# Patient Record
Sex: FEMALE | Race: WHITE
[De-identification: names, ages, dates, MRNs, and addresses within clinical notes are randomized per-mention and may not be internally consistent; named-entity substitution may affect disease eponyms.]

---

## 2017-05-05 ENCOUNTER — HOSPITAL ENCOUNTER (INPATIENT)
Dept: HOSPITAL 33 - MED SURG | Age: 72
LOS: 7 days | Discharge: HOME HEALTH | DRG: 603 | End: 2017-05-12
Attending: FAMILY MEDICINE | Admitting: FAMILY MEDICINE
Payer: MEDICARE

## 2017-05-05 DIAGNOSIS — K59.00: ICD-10-CM

## 2017-05-05 DIAGNOSIS — F45.42: ICD-10-CM

## 2017-05-05 DIAGNOSIS — N28.9: ICD-10-CM

## 2017-05-05 DIAGNOSIS — L03.119: Primary | ICD-10-CM

## 2017-05-05 DIAGNOSIS — M54.9: ICD-10-CM

## 2017-05-05 DIAGNOSIS — G89.29: ICD-10-CM

## 2017-05-05 DIAGNOSIS — I10: ICD-10-CM

## 2017-05-05 DIAGNOSIS — I50.9: ICD-10-CM

## 2017-05-05 DIAGNOSIS — G47.33: ICD-10-CM

## 2017-05-05 DIAGNOSIS — E11.40: ICD-10-CM

## 2017-05-05 DIAGNOSIS — E66.01: ICD-10-CM

## 2017-05-05 LAB
ALBUMIN SERPL-MCNC: 3 G/DL (ref 3.4–5)
ALP SERPL-CCNC: 87 U/L (ref 46–116)
ALT SERPL-CCNC: 12 U/L (ref 12–78)
ANION GAP SERPL CALC-SCNC: 12.8 MEQ/L (ref 5–15)
AST SERPL QL: 15 U/L (ref 15–37)
BASOPHILS NFR BLD AUTO: 0.2 % (ref 0–0.4)
BILIRUB BLD-MCNC: 0.3 MG/DL (ref 0.2–1)
BUN SERPL-MCNC: 24 MG/DL (ref 9–20)
CHLORIDE SERPL-SCNC: 103 MEQ/L (ref 98–107)
CO2 SERPL-SCNC: 29.7 MEQ/L (ref 21–32)
GLUCOSE SERPL-MCNC: 237 MG/DL (ref 70–110)
MCH RBC QN AUTO: 27 PG (ref 26–32)
NEUTROPHILS NFR BLD AUTO: 65.2 % (ref 36–66)
PLATELET # BLD AUTO: 188 K/MM3 (ref 150–450)
POTASSIUM SERPLBLD-SCNC: 4.4 MEQ/L (ref 3.5–5.1)
PROT SERPL-MCNC: 7.1 GM/DL (ref 6.4–8.2)
RBC # BLD AUTO: 4.29 M/MM3 (ref 4.1–5.4)
SODIUM SERPL-SCNC: 141 MEQ/L (ref 136–145)
WBC # BLD AUTO: 9.3 K/MM3 (ref 4–10.5)

## 2017-05-05 PROCEDURE — 80202 ASSAY OF VANCOMYCIN: CPT

## 2017-05-05 PROCEDURE — 80053 COMPREHEN METABOLIC PANEL: CPT

## 2017-05-05 PROCEDURE — 82962 GLUCOSE BLOOD TEST: CPT

## 2017-05-05 PROCEDURE — 94760 N-INVAS EAR/PLS OXIMETRY 1: CPT

## 2017-05-05 PROCEDURE — 36415 COLL VENOUS BLD VENIPUNCTURE: CPT

## 2017-05-05 PROCEDURE — 87040 BLOOD CULTURE FOR BACTERIA: CPT

## 2017-05-05 PROCEDURE — 87070 CULTURE OTHR SPECIMN AEROBIC: CPT

## 2017-05-05 PROCEDURE — 94660 CPAP INITIATION&MGMT: CPT

## 2017-05-05 PROCEDURE — 94640 AIRWAY INHALATION TREATMENT: CPT

## 2017-05-05 PROCEDURE — 84134 ASSAY OF PREALBUMIN: CPT

## 2017-05-05 PROCEDURE — 85025 COMPLETE CBC W/AUTO DIFF WBC: CPT

## 2017-05-05 PROCEDURE — 80048 BASIC METABOLIC PNL TOTAL CA: CPT

## 2017-05-05 RX ADMIN — INSULIN ASPART PRN UNIT: 100 INJECTION, SOLUTION INTRAVENOUS; SUBCUTANEOUS at 22:28

## 2017-05-05 RX ADMIN — FLUTICASONE PROPIONATE AND SALMETEROL XINAFOATE SCH PUFF: 230; 21 AEROSOL, METERED RESPIRATORY (INHALATION) at 19:30

## 2017-05-05 RX ADMIN — HYDROCODONE BITARTRATE AND ACETAMINOPHEN PRN TAB: 7.5; 325 TABLET ORAL at 21:39

## 2017-05-05 RX ADMIN — OXYBUTYNIN CHLORIDE SCH MG: 5 TABLET ORAL at 22:25

## 2017-05-05 RX ADMIN — ENOXAPARIN SODIUM SCH MG: 100 INJECTION SUBCUTANEOUS at 18:34

## 2017-05-05 RX ADMIN — INSULIN ASPART PRN UNIT: 100 INJECTION, SOLUTION INTRAVENOUS; SUBCUTANEOUS at 17:17

## 2017-05-05 RX ADMIN — LOSARTAN POTASSIUM SCH MG: 50 TABLET, FILM COATED ORAL at 22:24

## 2017-05-05 RX ADMIN — CLONIDINE HYDROCHLORIDE SCH MG: 0.1 TABLET ORAL at 22:26

## 2017-05-05 RX ADMIN — BISACODYL SCH MG: 5 TABLET, COATED ORAL at 22:26

## 2017-05-05 RX ADMIN — VERAPAMIL HYDROCHLORIDE SCH MG: 180 TABLET, FILM COATED, EXTENDED RELEASE ORAL at 23:22

## 2017-05-05 RX ADMIN — BUMETANIDE SCH: 1 TABLET ORAL at 17:11

## 2017-05-05 RX ADMIN — HYDROCODONE BITARTRATE AND ACETAMINOPHEN PRN TAB: 7.5; 325 TABLET ORAL at 17:11

## 2017-05-05 RX ADMIN — NYSTATIN SCH GM: 100000 POWDER TOPICAL at 22:27

## 2017-05-05 RX ADMIN — SODIUM CHLORIDE SCH MLS/HR: 9 INJECTION, SOLUTION INTRAVENOUS at 18:34

## 2017-05-05 RX ADMIN — GABAPENTIN SCH MG: 300 CAPSULE ORAL at 22:25

## 2017-05-06 RX ADMIN — VERAPAMIL HYDROCHLORIDE SCH MG: 180 TABLET, FILM COATED, EXTENDED RELEASE ORAL at 21:34

## 2017-05-06 RX ADMIN — LEVOFLOXACIN SCH MLS/HR: 5 INJECTION, SOLUTION INTRAVENOUS at 11:25

## 2017-05-06 RX ADMIN — INSULIN ASPART PRN UNIT: 100 INJECTION, SOLUTION INTRAVENOUS; SUBCUTANEOUS at 16:50

## 2017-05-06 RX ADMIN — GABAPENTIN SCH MG: 300 CAPSULE ORAL at 08:23

## 2017-05-06 RX ADMIN — VERAPAMIL HYDROCHLORIDE SCH MG: 180 TABLET, FILM COATED, EXTENDED RELEASE ORAL at 08:24

## 2017-05-06 RX ADMIN — OXYBUTYNIN CHLORIDE SCH MG: 5 TABLET ORAL at 21:36

## 2017-05-06 RX ADMIN — OXYBUTYNIN CHLORIDE SCH MG: 5 TABLET ORAL at 08:24

## 2017-05-06 RX ADMIN — LOSARTAN POTASSIUM SCH MG: 50 TABLET, FILM COATED ORAL at 21:34

## 2017-05-06 RX ADMIN — OXYCODONE HYDROCHLORIDE AND ACETAMINOPHEN PRN TAB: 10; 325 TABLET ORAL at 13:41

## 2017-05-06 RX ADMIN — BISACODYL SCH MG: 5 TABLET, COATED ORAL at 21:37

## 2017-05-06 RX ADMIN — INSULIN ASPART PRN UNIT: 100 INJECTION, SOLUTION INTRAVENOUS; SUBCUTANEOUS at 08:26

## 2017-05-06 RX ADMIN — CLONIDINE HYDROCHLORIDE SCH MG: 0.1 TABLET ORAL at 21:36

## 2017-05-06 RX ADMIN — NYSTATIN SCH GM: 100000 POWDER TOPICAL at 08:24

## 2017-05-06 RX ADMIN — ASPIRIN SCH MG: 325 TABLET, COATED ORAL at 09:29

## 2017-05-06 RX ADMIN — SIMVASTATIN SCH MG: 10 TABLET, FILM COATED ORAL at 09:29

## 2017-05-06 RX ADMIN — ENOXAPARIN SODIUM SCH MG: 100 INJECTION SUBCUTANEOUS at 08:27

## 2017-05-06 RX ADMIN — FLUTICASONE PROPIONATE AND SALMETEROL XINAFOATE SCH PUFF: 230; 21 AEROSOL, METERED RESPIRATORY (INHALATION) at 07:34

## 2017-05-06 RX ADMIN — INSULIN ASPART PRN UNIT: 100 INJECTION, SOLUTION INTRAVENOUS; SUBCUTANEOUS at 13:07

## 2017-05-06 RX ADMIN — SODIUM CHLORIDE SCH MLS/HR: 9 INJECTION, SOLUTION INTRAVENOUS at 12:32

## 2017-05-06 RX ADMIN — POLYETHYLENE GLYCOL 3350 SCH GM: 17 POWDER, FOR SOLUTION ORAL at 11:25

## 2017-05-06 RX ADMIN — CLONIDINE HYDROCHLORIDE SCH MG: 0.1 TABLET ORAL at 15:24

## 2017-05-06 RX ADMIN — BUMETANIDE SCH MG: 1 TABLET ORAL at 08:23

## 2017-05-06 RX ADMIN — CLONIDINE HYDROCHLORIDE SCH MG: 0.1 TABLET ORAL at 08:24

## 2017-05-06 RX ADMIN — HYDROCODONE BITARTRATE AND ACETAMINOPHEN PRN TAB: 7.5; 325 TABLET ORAL at 08:31

## 2017-05-06 RX ADMIN — GABAPENTIN SCH MG: 300 CAPSULE ORAL at 15:24

## 2017-05-06 RX ADMIN — GABAPENTIN SCH MG: 300 CAPSULE ORAL at 21:36

## 2017-05-06 RX ADMIN — OXYCODONE HYDROCHLORIDE AND ACETAMINOPHEN PRN TAB: 10; 325 TABLET ORAL at 20:02

## 2017-05-06 RX ADMIN — FLUTICASONE PROPIONATE AND SALMETEROL XINAFOATE SCH PUFF: 230; 21 AEROSOL, METERED RESPIRATORY (INHALATION) at 19:13

## 2017-05-06 NOTE — PCM.NOTE
Date and Time: 05/06/17  1057





Subjective Assessment: 





She is c/o sharp pain in the RLE.  seems to have increased warmth since 

yesterday.  Williams po well. constipated.





Objective Exam


General Appearance: no apparent distress, obese


Neurologic Exam: alert, oriented x 3, cooperative


Skin Exam: warm


Respiratory Exam: normal breath sounds, lungs clear, No crackles/rales, No 

rhonchi, No wheezing


Cardiovascular Exam: regular rate/rhythm, normal heart sounds, No murmur


Gastrointestinal/Abdomen Exam: soft, No tenderness


Extremity Exam: other (LLE with chronic venous stasis change.  RLE wrapped 

initially with dried yellow exudate; there is an odor.  Erythema till approx 

4cm distal to the knee; warm. when unwrapped, erythema is muted 

throughout.anteriorly there are several areas of shallow depression with blood 

seeping (s/p PT).)





OBJECTIVE DATA


Vital Signs: 


 Vital Signs - 24 hr











  Temp Pulse Resp BP Pulse Ox


 


 05/06/17 07:58  97.8 F  63  24  146/66  92 L


 


 05/06/17 07:00   92 H  18   93 L


 


 05/06/17 03:53  98.3 F  60  19  126/60  97


 


 05/06/17 00:00  98.4 F  63  20  120/62  94 L


 


 05/05/17 19:46  97.7 F  63  18  130/66  95


 


 05/05/17 19:30   69  18   96


 


 05/05/17 16:45   72  20   94 L


 


 05/05/17 16:10  97.8 F  65  20  124/61  91 L








 Oxygen-Last 24 hours











O2 Percentage                  2 Liters = 28%


 


O2 Percentage                  2 Liters = 28%











 Pain Assessment - Last Documented











Pain Intensity                 6


 


Pain Scale Used                0-10 Pain Scale











Intake and Output: 


 Intake & Output











 05/03/17 05/04/17 05/05/17 05/06/17





 11:59 11:59 11:59 11:59


 


Intake Total    2467


 


Output Total    700


 


Balance    1767


 


Weight    189.42 kg











Lab Results: 


 Accuchecks











Date                           05/05/17


 


Date                           05/05/17


 


Time                           20:54


 


Time                           16:30


 


Accucheck Value:               258


 


Accucheck Value:               315


 


Accucheck Value:               217











 Lab Results-Last 24 Hours











  05/05/17 05/05/17 05/05/17 Range/Units





  16:15 16:15 16:16 


 


WBC  9.3    (4.0-10.5)  K/mm3


 


RBC  4.29    (4.1-5.4)  M/mm3


 


Hgb  11.6 L    (12.0-16.0)  gm/dl


 


Hct  37.7    (35-47)  %


 


MCV  87.9    ()  fl


 


MCH  27.0    (26-32)  pg


 


MCHC  30.8 L    (32-36)  g/dl


 


RDW  15.6 H    (11.5-14.0)  %


 


Plt Count  188    (150-450)  K/mm3


 


MPV  10.5 H    (6-9.5)  fl


 


Gran %  65.2    (36.0-66.0)  %


 


Lymphocytes %  22.7 L    (24.0-44.0)  %


 


Monocytes %  6.0    (0.0-12.0)  %


 


Eosinophils %  5.9 H    (0.00-5.0)  %


 


Basophils %  0.2    (0.0-0.4)  %


 


Basophils #  0.02    (0-0.4)  


 


Sodium   141   (136-145)  mEq/L


 


Potassium   4.4   (3.5-5.1)  mEq/L


 


Chloride   103   ()  mEq/L


 


Carbon Dioxide   29.7   (21-32)  mEq/L


 


Anion Gap   12.8   (5-15)  MEQ/L


 


BUN   24 H   (9-20)  mg/dL


 


Creatinine   1.26   (0.55-1.30)  mg/dl


 


Estimated GFR   44   ML/MIN


 


Glucose   237 H   ()  MG/DL


 


Calcium   9.3   (8.5-10.1)  mg/dL


 


Total Bilirubin   0.3   (0.2-1.0)  mg/dL


 


AST   15   (15-37)  U/L


 


ALT   12   (12-78)  U/L


 


Alkaline Phosphatase   87   ()  U/L


 


Serum Total Protein   7.1   (6.4-8.2)  gm/dL


 


Albumin   3.0 L   (3.4-5.0)  g/dL


 


Prealbumin    14.9 L  (18.0-35.7)  mg/dL














Assessment/Plan


(1) Cellulitis


Current Visit: Yes   Status: Acute   


Assessment & Plan: 


Seems a bit worse to her; will add IV levaquin to the IV vancomycin. Change 

norco to percocet for pain.


Code(s): L03.90 - CELLULITIS, UNSPECIFIED   





(2) Constipation


Current Visit: Yes   Status: Acute   


Assessment & Plan: 


add miralax and dulcolax suppository prn.


Code(s): K59.00 - CONSTIPATION, UNSPECIFIED   





(3) Renal insufficiency


Current Visit: Yes   Status: Acute   


Assessment & Plan: 


recheck in a.m.








(4) Morbid obesity


Current Visit: Yes   Status: Chronic   Code(s): E66.01 - MORBID (SEVERE) 

OBESITY DUE TO EXCESS CALORIES   





(5) Chronic back pain


Current Visit: Yes   Status: Chronic   


Qualifiers: 


   Back pain location: back pain in unspecified location   Back pain laterality

: unspecified   Qualified Code(s): M54.9 - Dorsalgia, unspecified; G89.29 - 

Other chronic pain   


Assessment & Plan: 


takes norco at baseline for back pain.


Code(s): M54.9 - DORSALGIA, UNSPECIFIED; G89.29 - OTHER CHRONIC PAIN

## 2017-05-07 RX ADMIN — INSULIN ASPART PRN UNIT: 100 INJECTION, SOLUTION INTRAVENOUS; SUBCUTANEOUS at 21:31

## 2017-05-07 RX ADMIN — POLYETHYLENE GLYCOL 3350 SCH GM: 17 POWDER, FOR SOLUTION ORAL at 10:30

## 2017-05-07 RX ADMIN — LEVOFLOXACIN SCH MLS/HR: 5 INJECTION, SOLUTION INTRAVENOUS at 10:30

## 2017-05-07 RX ADMIN — OXYCODONE HYDROCHLORIDE AND ACETAMINOPHEN PRN TAB: 10; 325 TABLET ORAL at 11:21

## 2017-05-07 RX ADMIN — ENOXAPARIN SODIUM SCH MG: 100 INJECTION SUBCUTANEOUS at 10:30

## 2017-05-07 RX ADMIN — VERAPAMIL HYDROCHLORIDE SCH MG: 180 TABLET, FILM COATED, EXTENDED RELEASE ORAL at 21:29

## 2017-05-07 RX ADMIN — CLONIDINE HYDROCHLORIDE SCH MG: 0.1 TABLET ORAL at 15:39

## 2017-05-07 RX ADMIN — GABAPENTIN SCH MG: 300 CAPSULE ORAL at 15:39

## 2017-05-07 RX ADMIN — VERAPAMIL HYDROCHLORIDE SCH MG: 180 TABLET, FILM COATED, EXTENDED RELEASE ORAL at 10:30

## 2017-05-07 RX ADMIN — OXYCODONE HYDROCHLORIDE AND ACETAMINOPHEN PRN TAB: 10; 325 TABLET ORAL at 21:28

## 2017-05-07 RX ADMIN — OXYBUTYNIN CHLORIDE SCH MG: 5 TABLET ORAL at 10:30

## 2017-05-07 RX ADMIN — INSULIN ASPART PRN UNIT: 100 INJECTION, SOLUTION INTRAVENOUS; SUBCUTANEOUS at 11:55

## 2017-05-07 RX ADMIN — CLONIDINE HYDROCHLORIDE SCH MG: 0.1 TABLET ORAL at 21:27

## 2017-05-07 RX ADMIN — FLUTICASONE PROPIONATE AND SALMETEROL XINAFOATE SCH PUFF: 230; 21 AEROSOL, METERED RESPIRATORY (INHALATION) at 06:47

## 2017-05-07 RX ADMIN — INSULIN ASPART PRN UNIT: 100 INJECTION, SOLUTION INTRAVENOUS; SUBCUTANEOUS at 08:19

## 2017-05-07 RX ADMIN — FLUTICASONE PROPIONATE AND SALMETEROL XINAFOATE SCH PUFF: 230; 21 AEROSOL, METERED RESPIRATORY (INHALATION) at 19:14

## 2017-05-07 RX ADMIN — NYSTATIN SCH GM: 100000 POWDER TOPICAL at 21:30

## 2017-05-07 RX ADMIN — GABAPENTIN SCH MG: 300 CAPSULE ORAL at 21:26

## 2017-05-07 RX ADMIN — SIMVASTATIN SCH MG: 10 TABLET, FILM COATED ORAL at 10:30

## 2017-05-07 RX ADMIN — LOSARTAN POTASSIUM SCH MG: 50 TABLET, FILM COATED ORAL at 21:28

## 2017-05-07 RX ADMIN — ASPIRIN SCH MG: 325 TABLET, COATED ORAL at 10:30

## 2017-05-07 RX ADMIN — SODIUM CHLORIDE SCH MLS/HR: 9 INJECTION, SOLUTION INTRAVENOUS at 06:42

## 2017-05-07 RX ADMIN — INSULIN ASPART PRN UNIT: 100 INJECTION, SOLUTION INTRAVENOUS; SUBCUTANEOUS at 17:15

## 2017-05-07 RX ADMIN — BUMETANIDE SCH MG: 1 TABLET ORAL at 10:30

## 2017-05-07 RX ADMIN — NYSTATIN SCH GM: 100000 POWDER TOPICAL at 10:30

## 2017-05-07 RX ADMIN — GABAPENTIN SCH MG: 300 CAPSULE ORAL at 10:30

## 2017-05-07 RX ADMIN — OXYBUTYNIN CHLORIDE SCH MG: 5 TABLET ORAL at 21:27

## 2017-05-07 RX ADMIN — CLONIDINE HYDROCHLORIDE SCH MG: 0.1 TABLET ORAL at 10:30

## 2017-05-07 RX ADMIN — OXYCODONE HYDROCHLORIDE AND ACETAMINOPHEN PRN TAB: 10; 325 TABLET ORAL at 04:50

## 2017-05-07 RX ADMIN — BISACODYL SCH MG: 5 TABLET, COATED ORAL at 21:27

## 2017-05-07 RX ADMIN — OXYCODONE HYDROCHLORIDE AND ACETAMINOPHEN PRN TAB: 10; 325 TABLET ORAL at 15:43

## 2017-05-07 NOTE — PCM.NOTE
Date and Time: 05/07/17 0946





Subjective Assessment: 





The percocet helped her pain more than the norco; she was able to sleep last 

night.  Still constipated but she did not take the dulcolax suppository; she 

did take the miralax.  Redness is better in the RLE.





- Review of Systems


Constitutional: No Fever


Musculoskeletal: Other (leg pain RLE wiht exudate)





Objective Exam


General Appearance: no apparent distress, obese


Neurologic Exam: alert, oriented x 3, cooperative


Respiratory Exam: normal breath sounds, lungs clear, No crackles/rales, No 

rhonchi, No wheezing


Cardiovascular Exam: regular rate/rhythm, normal heart sounds, No murmur


Extremity Exam: other (RLE with erythema approx 2-3 cm inferior to marked line.

  scattered open areas with  yellow exudate and serous drainage)


Back Exam: normal inspection





OBJECTIVE DATA


Vital Signs: 


 Vital Signs - 24 hr











  Temp Pulse Resp BP Pulse Ox


 


 05/07/17 07:30  97.9 F  60  20  110/57  94 L


 


 05/07/17 06:51   78  18   96


 


 05/07/17 04:00  98 F  18 L   134/84  95


 


 05/07/17 00:00  98 F  18 L   134/84  95


 


 05/06/17 20:00  98.4 F  17 L   134/63  96


 


 05/06/17 19:13   17 L    96


 


 05/06/17 15:20  98.4 F  67  20  134/63  97


 


 05/06/17 12:00  97.8 F  77  26 H  147/77  93 L








 Oxygen-Last 24 hours











O2 Percentage                  2 Liters = 28%


 


O2 Percentage                  2 Liters = 28%











 Pain Assessment - Last Documented











Pain Intensity                 2


 


Pain Scale Used                0-10 Pain Scale











Intake and Output: 


 Intake & Output











 05/04/17 05/05/17 05/06/17 05/07/17





 11:59 11:59 11:59 11:59


 


Intake Total   2467 1100


 


Output Total   700 2300


 


Balance   1767 -1200


 


Weight   189.42 kg 190.645 kg











Lab Results: 


 Accuchecks











Date                           05/07/17


 


Date                           05/06/17


 


Date                           05/06/17


 


Time                           08:19


 


Time                           21:30


 


Time                           12:00


 


Accucheck Value:               236


 


Accucheck Value:               298


 


Accucheck Value:               293


 


Accucheck Value:               358











 Lab Results-Last 24 Hours











  05/07/17 Range/Units





  05:25 


 


Vancomycin Trough  13.5  (10-20)  UG/ML











Multi-Disciplinary Progress Notes: 


 Multi-Disciplinary Progress Notes





05/06/17 13:15 Physical Therapy Note by Jeanna Alex


TUBIGRIP SLEEVE CHARGED TO PATIENT FOR UNDER WALKING BOOT LLE.


DISCUSSED SKIN CARE BEST METHOD AT THIS TIME WITH PHYSICIAN AND NURSING 

STAFF.....BARRIER OINTMENT TO RIGHT CALF 2-3X PER DAY.....DAILY HIBICLENS 

SOAK....ICE PACKS TO COUNTER INFLAMMATION BID AND PRN.





Initialized on 05/06/17 13:15 - END OF NOTE

















Assessment/Plan


(1) Cellulitis


Current Visit: Yes   Status: Acute   


Assessment & Plan: 


Seems much improved since yesterday.  On Day #3 of Vancomycin and day #2 of 

levaquin, both IV.  Pain is better on percocet.


Code(s): L03.90 - CELLULITIS, UNSPECIFIED   





(2) Constipation


Current Visit: Yes   Status: Acute   


Assessment & Plan: 


Has not had a bowel movement yet. I encouraged daily miralax and try the 

dulcolax suppository today.


Code(s): K59.00 - CONSTIPATION, UNSPECIFIED   





(3) Renal insufficiency


Current Visit: Yes   Status: Acute   


Assessment & Plan: 


recheck labs in a.m.








(4) Morbid obesity


Current Visit: Yes   Status: Chronic   Code(s): E66.01 - MORBID (SEVERE) 

OBESITY DUE TO EXCESS CALORIES   





(5) Chronic back pain


Current Visit: Yes   Status: Chronic   


Qualifiers: 


   Back pain location: back pain in unspecified location   Back pain laterality

: unspecified   Qualified Code(s): M54.9 - Dorsalgia, unspecified; G89.29 - 

Other chronic pain   


Code(s): M54.9 - DORSALGIA, UNSPECIFIED; G89.29 - OTHER CHRONIC PAIN   





(6) Diabetes mellitus


Current Visit: Yes   Status: Acute   


Qualifiers: 


   Diabetes mellitus type: type 2   Diabetes mellitus complication status: 

without complication   Diabetes mellitus long term insulin use: unspecified 

long term insulin use status   Qualified Code(s): E11.9 - Type 2 diabetes 

mellitus without complications   


Assessment & Plan: 


A1c 8.4.  BS 250s-300s here; will increase lantus from 40 units to 45 units at 

hs.


Code(s): E11.9 - TYPE 2 DIABETES MELLITUS WITHOUT COMPLICATIONS

## 2017-05-08 LAB
ANION GAP SERPL CALC-SCNC: 12.9 MEQ/L (ref 5–15)
BASOPHILS NFR BLD AUTO: 0.3 % (ref 0–0.4)
BUN SERPL-MCNC: 20 MG/DL (ref 9–20)
CHLORIDE SERPL-SCNC: 101 MEQ/L (ref 98–107)
CO2 SERPL-SCNC: 28.6 MEQ/L (ref 21–32)
GLUCOSE SERPL-MCNC: 335 MG/DL (ref 70–110)
MCH RBC QN AUTO: 27.4 PG (ref 26–32)
NEUTROPHILS NFR BLD AUTO: 56.4 % (ref 36–66)
PLATELET # BLD AUTO: 157 K/MM3 (ref 150–450)
POTASSIUM SERPLBLD-SCNC: 4.5 MEQ/L (ref 3.5–5.1)
RBC # BLD AUTO: 4.04 M/MM3 (ref 4.1–5.4)
SODIUM SERPL-SCNC: 138 MEQ/L (ref 136–145)
WBC # BLD AUTO: 6.8 K/MM3 (ref 4–10.5)

## 2017-05-08 RX ADMIN — INSULIN ASPART PRN UNIT: 100 INJECTION, SOLUTION INTRAVENOUS; SUBCUTANEOUS at 22:16

## 2017-05-08 RX ADMIN — GABAPENTIN SCH MG: 300 CAPSULE ORAL at 22:12

## 2017-05-08 RX ADMIN — LOSARTAN POTASSIUM SCH MG: 50 TABLET, FILM COATED ORAL at 22:09

## 2017-05-08 RX ADMIN — VERAPAMIL HYDROCHLORIDE SCH MG: 180 TABLET, FILM COATED, EXTENDED RELEASE ORAL at 22:12

## 2017-05-08 RX ADMIN — FLUTICASONE PROPIONATE AND SALMETEROL XINAFOATE SCH PUFF: 230; 21 AEROSOL, METERED RESPIRATORY (INHALATION) at 06:30

## 2017-05-08 RX ADMIN — INSULIN ASPART PRN UNIT: 100 INJECTION, SOLUTION INTRAVENOUS; SUBCUTANEOUS at 11:15

## 2017-05-08 RX ADMIN — OXYCODONE HYDROCHLORIDE AND ACETAMINOPHEN PRN TAB: 10; 325 TABLET ORAL at 12:25

## 2017-05-08 RX ADMIN — SIMVASTATIN SCH MG: 10 TABLET, FILM COATED ORAL at 08:06

## 2017-05-08 RX ADMIN — INSULIN ASPART PRN UNIT: 100 INJECTION, SOLUTION INTRAVENOUS; SUBCUTANEOUS at 07:30

## 2017-05-08 RX ADMIN — NYSTATIN SCH GM: 100000 POWDER TOPICAL at 08:50

## 2017-05-08 RX ADMIN — CLONIDINE HYDROCHLORIDE SCH MG: 0.1 TABLET ORAL at 11:11

## 2017-05-08 RX ADMIN — CLONIDINE HYDROCHLORIDE SCH: 0.1 TABLET ORAL at 08:50

## 2017-05-08 RX ADMIN — ASPIRIN SCH MG: 325 TABLET, COATED ORAL at 08:06

## 2017-05-08 RX ADMIN — BISACODYL SCH MG: 5 TABLET, COATED ORAL at 22:10

## 2017-05-08 RX ADMIN — OXYCODONE HYDROCHLORIDE AND ACETAMINOPHEN PRN TAB: 10; 325 TABLET ORAL at 08:03

## 2017-05-08 RX ADMIN — GABAPENTIN SCH MG: 300 CAPSULE ORAL at 08:06

## 2017-05-08 RX ADMIN — VERAPAMIL HYDROCHLORIDE SCH MG: 180 TABLET, FILM COATED, EXTENDED RELEASE ORAL at 11:11

## 2017-05-08 RX ADMIN — OXYBUTYNIN CHLORIDE SCH MG: 5 TABLET ORAL at 08:06

## 2017-05-08 RX ADMIN — NYSTATIN SCH GM: 100000 POWDER TOPICAL at 22:17

## 2017-05-08 RX ADMIN — GABAPENTIN SCH MG: 300 CAPSULE ORAL at 14:29

## 2017-05-08 RX ADMIN — ENOXAPARIN SODIUM SCH MG: 100 INJECTION SUBCUTANEOUS at 08:06

## 2017-05-08 RX ADMIN — OXYCODONE HYDROCHLORIDE AND ACETAMINOPHEN PRN TAB: 10; 325 TABLET ORAL at 03:42

## 2017-05-08 RX ADMIN — CLONIDINE HYDROCHLORIDE SCH MG: 0.1 TABLET ORAL at 22:12

## 2017-05-08 RX ADMIN — OXYCODONE HYDROCHLORIDE AND ACETAMINOPHEN PRN TAB: 10; 325 TABLET ORAL at 22:12

## 2017-05-08 RX ADMIN — SODIUM CHLORIDE SCH MLS/HR: 9 INJECTION, SOLUTION INTRAVENOUS at 00:54

## 2017-05-08 RX ADMIN — SODIUM CHLORIDE SCH MLS/HR: 9 INJECTION, SOLUTION INTRAVENOUS at 16:52

## 2017-05-08 RX ADMIN — POLYETHYLENE GLYCOL 3350 SCH GM: 17 POWDER, FOR SOLUTION ORAL at 08:05

## 2017-05-08 RX ADMIN — VERAPAMIL HYDROCHLORIDE SCH: 180 TABLET, FILM COATED, EXTENDED RELEASE ORAL at 08:50

## 2017-05-08 RX ADMIN — OXYCODONE HYDROCHLORIDE AND ACETAMINOPHEN PRN TAB: 10; 325 TABLET ORAL at 17:08

## 2017-05-08 RX ADMIN — SODIUM CHLORIDE SCH MLS/HR: 9 INJECTION, SOLUTION INTRAVENOUS at 17:07

## 2017-05-08 RX ADMIN — CLONIDINE HYDROCHLORIDE SCH: 0.1 TABLET ORAL at 14:33

## 2017-05-08 RX ADMIN — INSULIN ASPART PRN UNIT: 100 INJECTION, SOLUTION INTRAVENOUS; SUBCUTANEOUS at 16:19

## 2017-05-08 RX ADMIN — OXYBUTYNIN CHLORIDE SCH MG: 5 TABLET ORAL at 22:12

## 2017-05-08 RX ADMIN — FLUTICASONE PROPIONATE AND SALMETEROL XINAFOATE SCH PUFF: 230; 21 AEROSOL, METERED RESPIRATORY (INHALATION) at 19:51

## 2017-05-08 RX ADMIN — BUMETANIDE SCH MG: 1 TABLET ORAL at 08:06

## 2017-05-08 RX ADMIN — LEVOFLOXACIN SCH MLS/HR: 5 INJECTION, SOLUTION INTRAVENOUS at 08:05

## 2017-05-08 NOTE — PCM.NOTE
Date and Time: 05/08/17 0817





Subjective Assessment: 





no new problems or concerns, has some improvement in redness and pain to right 

leg.





Objective Exam


General Appearance: no apparent distress, alert, obese


Respiratory Exam: normal breath sounds, lungs clear, No respiratory distress


Gastrointestinal/Abdomen Exam: soft, No tenderness, No mass


Extremity Exam: other (right lower leg, superficial open area. improved erythema

, mild drainage present)





OBJECTIVE DATA


Vital Signs: 


 Vital Signs - 24 hr











  Temp Pulse Resp BP Pulse Ox


 


 05/08/17 07:19  97.7 F  60  18  102/55  96


 


 05/08/17 06:33   65  16   94 L


 


 05/08/17 04:00  97.7 F  66  15  134/64  94 L


 


 05/08/17 00:00  97.9 F  70  15  150/65  95


 


 05/07/17 20:00  98.3 F  63  17  124/63  95


 


 05/07/17 19:16   67  20   95


 


 05/07/17 16:00   64  18  125/58  93 L


 


 05/07/17 11:47  98.2 F  61  18  108/53  95








 Oxygen-Last 24 hours











O2 Percentage                  2 Liters = 28%


 


O2 Percentage                  2 Liters = 28%


 


O2 Percentage                  2 Liters = 28%


 


O2 Percentage                  2 Liters = 28%


 


O2 Percentage                  2 Liters = 28%


 


O2 Percentage                  2 Liters = 28%











 Pain Assessment - Last Documented











Pain Intensity                 4


 


Pain Scale Used                0-10 Pain Scale











Intake and Output: 


 Intake & Output











 05/05/17 05/06/17 05/07/17 05/08/17





 11:59 11:59 11:59 11:59


 


Intake Total  2467 1100 2180


 


Output Total  700 2300 900


 


Balance  1767 -1200 1280


 


Weight  189.42 kg 190.645 kg 











Lab Results: 


 Accuchecks











Date                           05/08/17


 


Date                           05/07/17


 


Time                           07:31


 


Time                           11:30


 


Accucheck Value:               259


 


Accucheck Value:               357


 


Accucheck Value:               329


 


Accucheck Value:               343











 Lab Results-Last 24 Hours











  05/08/17 05/08/17 Range/Units





  05:15 05:15 


 


WBC  6.8   (4.0-10.5)  K/mm3


 


RBC  4.04 L   (4.1-5.4)  M/mm3


 


Hgb  11.1 L   (12.0-16.0)  gm/dl


 


Hct  36.2   (35-47)  %


 


MCV  89.6   ()  fl


 


MCH  27.4   (26-32)  pg


 


MCHC  30.7 L   (32-36)  g/dl


 


RDW  15.3 H   (11.5-14.0)  %


 


Plt Count  157   (150-450)  K/mm3


 


MPV  10.7 H   (6-9.5)  fl


 


Gran %  56.4   (36.0-66.0)  %


 


Lymphocytes %  27.9   (24.0-44.0)  %


 


Monocytes %  7.9   (0.0-12.0)  %


 


Eosinophils %  7.5 H   (0.00-5.0)  %


 


Basophils %  0.3   (0.0-0.4)  %


 


Basophils #  0.02   (0-0.4)  


 


Sodium   138  (136-145)  mEq/L


 


Potassium   4.5  (3.5-5.1)  mEq/L


 


Chloride   101  ()  mEq/L


 


Carbon Dioxide   28.6  (21-32)  mEq/L


 


Anion Gap   12.9  (5-15)  MEQ/L


 


BUN   20  (9-20)  mg/dL


 


Creatinine   1.31 H  (0.55-1.30)  mg/dl


 


Estimated GFR   42  ML/MIN


 


Glucose   335 H  ()  MG/DL


 


Calcium   8.9  (8.5-10.1)  mg/dL














Assessment/Plan


(1) Cellulitis


Current Visit: Yes   Status: Acute   


Assessment & Plan: 


continue Vanc, wound c and s pending. continue PT for wound care.


Code(s): L03.90 - CELLULITIS, UNSPECIFIED   





(2) Diabetes mellitus


Current Visit: Yes   Status: Acute   


Qualifiers: 


   Diabetes mellitus type: type 2   Diabetes mellitus complication status: 

without complication   Diabetes mellitus long term insulin use: unspecified 

long term insulin use status   Qualified Code(s): E11.9 - Type 2 diabetes 

mellitus without complications   


Assessment & Plan: 


stable, no changes


Code(s): E11.9 - TYPE 2 DIABETES MELLITUS WITHOUT COMPLICATIONS   





(3) Renal insufficiency


Current Visit: Yes   Status: Acute   





(4) Morbid obesity


Current Visit: Yes   Status: Chronic   Code(s): E66.01 - MORBID (SEVERE) 

OBESITY DUE TO EXCESS CALORIES

## 2017-05-09 RX ADMIN — INSULIN ASPART PRN UNIT: 100 INJECTION, SOLUTION INTRAVENOUS; SUBCUTANEOUS at 07:54

## 2017-05-09 RX ADMIN — VERAPAMIL HYDROCHLORIDE SCH MG: 180 TABLET, FILM COATED, EXTENDED RELEASE ORAL at 09:05

## 2017-05-09 RX ADMIN — OXYCODONE HYDROCHLORIDE AND ACETAMINOPHEN PRN TAB: 10; 325 TABLET ORAL at 11:51

## 2017-05-09 RX ADMIN — SIMVASTATIN SCH MG: 10 TABLET, FILM COATED ORAL at 09:05

## 2017-05-09 RX ADMIN — VERAPAMIL HYDROCHLORIDE SCH MG: 180 TABLET, FILM COATED, EXTENDED RELEASE ORAL at 21:50

## 2017-05-09 RX ADMIN — LOSARTAN POTASSIUM SCH MG: 50 TABLET, FILM COATED ORAL at 21:49

## 2017-05-09 RX ADMIN — FLUTICASONE PROPIONATE AND SALMETEROL XINAFOATE SCH PUFF: 230; 21 AEROSOL, METERED RESPIRATORY (INHALATION) at 06:53

## 2017-05-09 RX ADMIN — ASPIRIN SCH MG: 325 TABLET, COATED ORAL at 09:04

## 2017-05-09 RX ADMIN — SODIUM CHLORIDE SCH MLS/HR: 9 INJECTION, SOLUTION INTRAVENOUS at 11:52

## 2017-05-09 RX ADMIN — ENOXAPARIN SODIUM SCH MG: 100 INJECTION SUBCUTANEOUS at 10:49

## 2017-05-09 RX ADMIN — GABAPENTIN SCH MG: 300 CAPSULE ORAL at 21:50

## 2017-05-09 RX ADMIN — GABAPENTIN SCH MG: 300 CAPSULE ORAL at 09:06

## 2017-05-09 RX ADMIN — CLONIDINE HYDROCHLORIDE SCH MG: 0.1 TABLET ORAL at 09:05

## 2017-05-09 RX ADMIN — INSULIN ASPART PRN UNIT: 100 INJECTION, SOLUTION INTRAVENOUS; SUBCUTANEOUS at 11:53

## 2017-05-09 RX ADMIN — GABAPENTIN SCH MG: 300 CAPSULE ORAL at 14:24

## 2017-05-09 RX ADMIN — CLONIDINE HYDROCHLORIDE SCH MG: 0.1 TABLET ORAL at 14:24

## 2017-05-09 RX ADMIN — INSULIN ASPART PRN UNIT: 100 INJECTION, SOLUTION INTRAVENOUS; SUBCUTANEOUS at 23:12

## 2017-05-09 RX ADMIN — POLYETHYLENE GLYCOL 3350 SCH GM: 17 POWDER, FOR SOLUTION ORAL at 09:04

## 2017-05-09 RX ADMIN — NYSTATIN SCH GM: 100000 POWDER TOPICAL at 09:10

## 2017-05-09 RX ADMIN — NYSTATIN SCH GM: 100000 POWDER TOPICAL at 21:50

## 2017-05-09 RX ADMIN — CLONIDINE HYDROCHLORIDE SCH MG: 0.1 TABLET ORAL at 21:50

## 2017-05-09 RX ADMIN — INSULIN ASPART PRN UNIT: 100 INJECTION, SOLUTION INTRAVENOUS; SUBCUTANEOUS at 17:21

## 2017-05-09 RX ADMIN — FLUTICASONE PROPIONATE AND SALMETEROL XINAFOATE SCH PUFF: 230; 21 AEROSOL, METERED RESPIRATORY (INHALATION) at 19:12

## 2017-05-09 RX ADMIN — OXYBUTYNIN CHLORIDE SCH MG: 5 TABLET ORAL at 21:50

## 2017-05-09 RX ADMIN — OXYBUTYNIN CHLORIDE SCH MG: 5 TABLET ORAL at 09:04

## 2017-05-09 RX ADMIN — BISACODYL SCH MG: 5 TABLET, COATED ORAL at 22:10

## 2017-05-09 RX ADMIN — BUMETANIDE SCH MG: 1 TABLET ORAL at 09:06

## 2017-05-09 RX ADMIN — INSULIN GLARGINE SCH UNIT: 100 INJECTION, SOLUTION SUBCUTANEOUS at 21:52

## 2017-05-09 RX ADMIN — LEVOFLOXACIN SCH MLS/HR: 5 INJECTION, SOLUTION INTRAVENOUS at 09:13

## 2017-05-09 RX ADMIN — OXYCODONE HYDROCHLORIDE AND ACETAMINOPHEN PRN TAB: 10; 325 TABLET ORAL at 05:56

## 2017-05-09 RX ADMIN — OXYCODONE HYDROCHLORIDE AND ACETAMINOPHEN PRN TAB: 10; 325 TABLET ORAL at 19:35

## 2017-05-09 NOTE — PCM.NOTE
Date and Time: 05/09/17 0830





Subjective Assessment: 





Leg pain is better, still hurts at times. Better wtih po pain meds.  Still 

constipated despite miralax.





Objective Exam


General Appearance: no apparent distress, obese, other (SOB after changing 

position in bed)


Neurologic Exam: alert, oriented x 3, cooperative


Skin Exam: normal color, warm, dry


Respiratory Exam: normal breath sounds, lungs clear, No crackles/rales, No 

rhonchi, No wheezing


Cardiovascular Exam: regular rate/rhythm, normal heart sounds, No murmur


Extremity Exam: other (RLE wrapped.  erythema appears decreased superiorly. LLE 

chronic venous stasis change as usual.)





OBJECTIVE DATA


Vital Signs: 


 Vital Signs - 24 hr











  Temp Pulse Resp BP Pulse Ox


 


 05/09/17 07:35  98.5 F  74  20  126/60  95


 


 05/09/17 07:00   72  18   92 L


 


 05/09/17 04:00  98.4 F  85  22  155/65  90 L


 


 05/09/17 00:00  98.1 F  70  22  128/57  96


 


 05/08/17 20:00  98.0 F  64  24  114/64  96


 


 05/08/17 19:00   65  18   89 L


 


 05/08/17 15:55  98.3 F  65  18  109/56  96


 


 05/08/17 11:10  98.2 F  65  18  141/65  93 L








 Oxygen-Last 24 hours











O2 Percentage                  2 Liters = 28%


 


O2 Percentage                  2 Liters = 28%


 


O2 Percentage                  2 Liters = 28%


 


O2 Percentage                  2 Liters = 28%


 


O2 Percentage                  2 Liters = 28%











 Pain Assessment - Last Documented











Pain Intensity                 6


 


Pain Scale Used                [facility]











Intake and Output: 


 Intake & Output











 05/06/17 05/07/17 05/08/17 05/09/17





 11:59 11:59 11:59 11:59


 


Intake Total 2467 1100 2660 1580


 


Output Total 700 2300 1800 1900


 


Balance 1767 -1200 860 -320


 


Weight 189.42 kg 190.645 kg  192.913 kg











Lab Results: 


 Accuchecks











Date                           05/08/17


 


Date                           05/08/17


 


Date                           05/08/17


 


Time                           21:30


 


Time                           16:20


 


Time                           11:15


 


Accucheck Value:               326


 


Accucheck Value:               310


 


Accucheck Value:               321

















Assessment/Plan


(1) Cellulitis


Current Visit: Yes   Status: Acute   


Qualifiers: 


   Site of cellulitis: extremity   Site of cellulitis of extremity: lower 

extremity   Laterality: right   Qualified Code(s): L03.115 - Cellulitis of 

right lower limb   


Assessment & Plan: 


On IV vancomycin and levaquin with good response.  PT doing wound care, thank 

you!


Code(s): L03.90 - CELLULITIS, UNSPECIFIED   





(2) Constipation


Current Visit: Yes   Status: Acute   


Qualifiers: 


   Constipation type: slow transit constipation   Qualified Code(s): K59.01 - 

Slow transit constipation   


Assessment & Plan: 


exacerbated by pain meds. Try magnesium citrate today.


Code(s): K59.00 - CONSTIPATION, UNSPECIFIED   





(3) Renal insufficiency


Current Visit: Yes   Status: Acute   


Assessment & Plan: 


recheck in a.m.








(4) Morbid obesity


Current Visit: Yes   Status: Chronic   


Qualifiers: 


   Obesity type: due to excess calories   Qualified Code(s): E66.01 - Morbid (

severe) obesity due to excess calories   


Code(s): E66.01 - MORBID (SEVERE) OBESITY DUE TO EXCESS CALORIES   





(5) Chronic back pain


Current Visit: Yes   Status: Chronic   


Qualifiers: 


   Back pain location: back pain in unspecified location   Back pain laterality

: unspecified   Qualified Code(s): M54.9 - Dorsalgia, unspecified; G89.29 - 

Other chronic pain   


Code(s): M54.9 - DORSALGIA, UNSPECIFIED; G89.29 - OTHER CHRONIC PAIN   





(6) Diabetes mellitus


Current Visit: Yes   Status: Acute   


Qualifiers: 


   Diabetes mellitus type: type 2   Diabetes mellitus complication status: 

without complication   Diabetes mellitus long term insulin use: unspecified 

long term insulin use status   Qualified Code(s): E11.9 - Type 2 diabetes 

mellitus without complications   


Assessment & Plan: 


BS in 300s - will increase lantus to 55 units at hs.


Code(s): E11.9 - TYPE 2 DIABETES MELLITUS WITHOUT COMPLICATIONS

## 2017-05-10 LAB
ANION GAP SERPL CALC-SCNC: 8.4 MEQ/L (ref 5–15)
BASOPHILS NFR BLD AUTO: 0.4 % (ref 0–0.4)
BUN SERPL-MCNC: 21 MG/DL (ref 9–20)
CHLORIDE SERPL-SCNC: 101 MEQ/L (ref 98–107)
CO2 SERPL-SCNC: 32.4 MEQ/L (ref 21–32)
GLUCOSE SERPL-MCNC: 301 MG/DL (ref 70–110)
MCH RBC QN AUTO: 27.2 PG (ref 26–32)
NEUTROPHILS NFR BLD AUTO: 57.6 % (ref 36–66)
PLATELET # BLD AUTO: 148 K/MM3 (ref 150–450)
POTASSIUM SERPLBLD-SCNC: 4.5 MEQ/L (ref 3.5–5.1)
RBC # BLD AUTO: 4.14 M/MM3 (ref 4.1–5.4)
SODIUM SERPL-SCNC: 137 MEQ/L (ref 136–145)
WBC # BLD AUTO: 7.3 K/MM3 (ref 4–10.5)

## 2017-05-10 RX ADMIN — NYSTATIN SCH GM: 100000 POWDER TOPICAL at 22:00

## 2017-05-10 RX ADMIN — FLUTICASONE PROPIONATE AND SALMETEROL XINAFOATE SCH PUFF: 230; 21 AEROSOL, METERED RESPIRATORY (INHALATION) at 07:19

## 2017-05-10 RX ADMIN — ENOXAPARIN SODIUM SCH MG: 100 INJECTION SUBCUTANEOUS at 09:47

## 2017-05-10 RX ADMIN — NYSTATIN SCH GM: 100000 POWDER TOPICAL at 09:47

## 2017-05-10 RX ADMIN — OXYCODONE HYDROCHLORIDE AND ACETAMINOPHEN PRN TAB: 10; 325 TABLET ORAL at 05:34

## 2017-05-10 RX ADMIN — INSULIN ASPART PRN UNIT: 100 INJECTION, SOLUTION INTRAVENOUS; SUBCUTANEOUS at 08:02

## 2017-05-10 RX ADMIN — INSULIN ASPART PRN UNIT: 100 INJECTION, SOLUTION INTRAVENOUS; SUBCUTANEOUS at 11:53

## 2017-05-10 RX ADMIN — OXYCODONE HYDROCHLORIDE AND ACETAMINOPHEN PRN TAB: 10; 325 TABLET ORAL at 21:56

## 2017-05-10 RX ADMIN — GABAPENTIN SCH MG: 300 CAPSULE ORAL at 09:46

## 2017-05-10 RX ADMIN — CLONIDINE HYDROCHLORIDE SCH MG: 0.1 TABLET ORAL at 09:46

## 2017-05-10 RX ADMIN — VERAPAMIL HYDROCHLORIDE SCH MG: 180 TABLET, FILM COATED, EXTENDED RELEASE ORAL at 21:57

## 2017-05-10 RX ADMIN — GABAPENTIN SCH MG: 300 CAPSULE ORAL at 21:57

## 2017-05-10 RX ADMIN — CLONIDINE HYDROCHLORIDE SCH MG: 0.1 TABLET ORAL at 15:04

## 2017-05-10 RX ADMIN — FLUTICASONE PROPIONATE AND SALMETEROL XINAFOATE SCH PUFF: 230; 21 AEROSOL, METERED RESPIRATORY (INHALATION) at 19:33

## 2017-05-10 RX ADMIN — SODIUM CHLORIDE SCH MLS/HR: 9 INJECTION, SOLUTION INTRAVENOUS at 05:34

## 2017-05-10 RX ADMIN — GABAPENTIN SCH MG: 300 CAPSULE ORAL at 15:04

## 2017-05-10 RX ADMIN — INSULIN GLARGINE SCH UNIT: 100 INJECTION, SOLUTION SUBCUTANEOUS at 22:09

## 2017-05-10 RX ADMIN — LEVOFLOXACIN SCH MLS/HR: 5 INJECTION, SOLUTION INTRAVENOUS at 09:46

## 2017-05-10 RX ADMIN — VERAPAMIL HYDROCHLORIDE SCH MG: 180 TABLET, FILM COATED, EXTENDED RELEASE ORAL at 09:47

## 2017-05-10 RX ADMIN — INSULIN ASPART PRN UNIT: 100 INJECTION, SOLUTION INTRAVENOUS; SUBCUTANEOUS at 16:50

## 2017-05-10 RX ADMIN — CLONIDINE HYDROCHLORIDE SCH MG: 0.1 TABLET ORAL at 21:56

## 2017-05-10 RX ADMIN — ASPIRIN SCH MG: 325 TABLET, COATED ORAL at 09:47

## 2017-05-10 RX ADMIN — BUMETANIDE SCH MG: 1 TABLET ORAL at 09:47

## 2017-05-10 RX ADMIN — OXYBUTYNIN CHLORIDE SCH MG: 5 TABLET ORAL at 09:46

## 2017-05-10 RX ADMIN — INSULIN ASPART PRN UNIT: 100 INJECTION, SOLUTION INTRAVENOUS; SUBCUTANEOUS at 22:10

## 2017-05-10 RX ADMIN — OXYBUTYNIN CHLORIDE SCH MG: 5 TABLET ORAL at 21:57

## 2017-05-10 RX ADMIN — BISACODYL SCH MG: 5 TABLET, COATED ORAL at 21:57

## 2017-05-10 RX ADMIN — POLYETHYLENE GLYCOL 3350 SCH GM: 17 POWDER, FOR SOLUTION ORAL at 09:46

## 2017-05-10 RX ADMIN — OXYCODONE HYDROCHLORIDE AND ACETAMINOPHEN PRN TAB: 10; 325 TABLET ORAL at 09:51

## 2017-05-10 RX ADMIN — SIMVASTATIN SCH MG: 10 TABLET, FILM COATED ORAL at 09:46

## 2017-05-10 RX ADMIN — LOSARTAN POTASSIUM SCH MG: 50 TABLET, FILM COATED ORAL at 21:56

## 2017-05-10 NOTE — PCM.NOTE
Date and Time: 05/10/17  0844





Subjective Assessment: 





redness, drainage, pain and swelling improving to RLE. 





Objective Exam


General Appearance: no apparent distress, alert, obese


Respiratory Exam: normal breath sounds, lungs clear, No respiratory distress


Cardiovascular Exam: regular rate/rhythm, normal heart sounds


Gastrointestinal/Abdomen Exam: soft, No tenderness, No mass


Extremity Exam: other (superficial open area, improved redness and warmth)





OBJECTIVE DATA


Vital Signs: 


 Vital Signs - 24 hr











  Temp Pulse Resp BP Pulse Ox


 


 05/10/17 07:19   71  16   97


 


 05/10/17 07:14  98.5 F  71  18  135/67  92 L


 


 05/10/17 04:00  99.0 F  64  19  118/56  95


 


 05/10/17 00:00  98.1 F  65  19  120/61  93 L


 


 05/09/17 20:00  98.0 F  69  20  131/64  95


 


 05/09/17 19:00   63  18   93 L


 


 05/09/17 16:00  98.0 F  58 L  18  114/56  95


 


 05/09/17 11:48  98.0 F  70  18  121/57  96








 Oxygen-Last 24 hours











O2 Percentage                  2 Liters = 28%


 


O2 Percentage                  2 Liters = 28%


 


O2 Percentage                  2 Liters = 28%











 Pain Assessment - Last Documented











Pain Intensity                 6


 


Pain Scale Used                0-10 Pain Scale











Intake and Output: 


 Intake & Output











 05/07/17 05/08/17 05/09/17 05/10/17





 11:59 11:59 11:59 11:59


 


Intake Total 1100 2660 1960 2300


 


Output Total 2300 1800 1900 800


 


Balance -1200 183 19 0201


 


Weight 190.645 kg  192.913 kg 193.503 kg











Lab Results: 


 Accuchecks











Date                           05/09/17


 


Date                           05/09/17


 


Date                           05/09/17


 


Time                           22:00


 


Time                           16:30


 


Time                           11:30


 


Accucheck Value:               307


 


Accucheck Value:               274


 


Accucheck Value:               299











 Lab Results-Last 24 Hours











  05/09/17 05/10/17 05/10/17 Range/Units





  11:33 05:22 05:22 


 


WBC   7.3   (4.0-10.5)  K/mm3


 


RBC   4.14   (4.1-5.4)  M/mm3


 


Hgb   11.3 L   (12.0-16.0)  gm/dl


 


Hct   37.0   (35-47)  %


 


MCV   89.4   ()  fl


 


MCH   27.2   (26-32)  pg


 


MCHC   30.5 L   (32-36)  g/dl


 


RDW   15.2 H   (11.5-14.0)  %


 


Plt Count   148 L   (150-450)  K/mm3


 


MPV   10.5 H   (6-9.5)  fl


 


Gran %   57.6   (36.0-66.0)  %


 


Lymphocytes %   25.1   (24.0-44.0)  %


 


Monocytes %   9.1   (0.0-12.0)  %


 


Eosinophils %   7.8 H   (0.00-5.0)  %


 


Basophils %   0.4   (0.0-0.4)  %


 


Basophils #   0.03   (0-0.4)  


 


Sodium    137  (136-145)  mEq/L


 


Potassium    4.5  (3.5-5.1)  mEq/L


 


Chloride    101  ()  mEq/L


 


Carbon Dioxide    32.4 H  (21-32)  mEq/L


 


Anion Gap    8.4  (5-15)  MEQ/L


 


BUN    21 H  (9-20)  mg/dL


 


Creatinine    1.35 H  (0.55-1.30)  mg/dl


 


Estimated GFR    41  ML/MIN


 


Glucose    301 H  ()  MG/DL


 


Calcium    9.2  (8.5-10.1)  mg/dL


 


Vancomycin Trough  17.0    (10-20)  UG/ML











Multi-Disciplinary Progress Notes: 


 Multi-Disciplinary Progress Notes





05/09/17 12:54 Pharmacy Note by Ramin Jackson


Vancomycin trough 17.0. Good level, creat up a little to 1.31.  BMP scheduled 

for tomorrow am.  Will adjust dose if creat goes up. 





Initialized on 05/09/17 12:54 - END OF NOTE








05/09/17 10:43 Case Management Note by Delfina Cordon


SPOKE WITH PT THIS AM. PT ALREADY HAS [facility]. WILL CONT TO MONITOR ANY D/

C NEEDS DURING STAY.





Initialized on 05/09/17 10:43 - END OF NOTE

















Assessment/Plan


(1) Cellulitis


Current Visit: Yes   Status: Acute   


Qualifiers: 


   Site of cellulitis: extremity   Site of cellulitis of extremity: lower 

extremity   Laterality: right   Qualified Code(s): L03.115 - Cellulitis of 

right lower limb   


Assessment & Plan: 


continue vanc and levaquin, improving. wound culture not helpful, multiple 

organisms c/w skin tamiko, no predominant organism


Code(s): L03.90 - CELLULITIS, UNSPECIFIED   





(2) Diabetes mellitus


Current Visit: Yes   Status: Acute   


Qualifiers: 


   Diabetes mellitus type: type 2   Diabetes mellitus complication status: 

without complication   Diabetes mellitus long term insulin use: unspecified 

long term insulin use status   Qualified Code(s): E11.9 - Type 2 diabetes 

mellitus without complications   


Code(s): E11.9 - TYPE 2 DIABETES MELLITUS WITHOUT COMPLICATIONS   





(3) Renal insufficiency


Current Visit: Yes   Status: Acute   





(4) Morbid obesity


Current Visit: Yes   Status: Chronic   


Qualifiers: 


   Obesity type: due to excess calories   Qualified Code(s): E66.01 - Morbid (

severe) obesity due to excess calories   


Code(s): E66.01 - MORBID (SEVERE) OBESITY DUE TO EXCESS CALORIES   





(5) Constipation


Current Visit: Yes   Status: Acute   


Qualifiers: 


   Constipation type: slow transit constipation   Qualified Code(s): K59.01 - 

Slow transit constipation   


Assessment & Plan: 


no results with prune juice, mag citrate etc. order fleets enema today


Code(s): K59.00 - CONSTIPATION, UNSPECIFIED

## 2017-05-11 LAB
ANION GAP SERPL CALC-SCNC: 11.2 MEQ/L (ref 5–15)
BASOPHILS NFR BLD AUTO: 0.2 % (ref 0–0.4)
BUN SERPL-MCNC: 21 MG/DL (ref 9–20)
CHLORIDE SERPL-SCNC: 101 MEQ/L (ref 98–107)
CO2 SERPL-SCNC: 30.2 MEQ/L (ref 21–32)
GLUCOSE SERPL-MCNC: 294 MG/DL (ref 70–110)
MCH RBC QN AUTO: 27.2 PG (ref 26–32)
NEUTROPHILS NFR BLD AUTO: 53.3 % (ref 36–66)
PLATELET # BLD AUTO: 156 K/MM3 (ref 150–450)
POTASSIUM SERPLBLD-SCNC: 4.5 MEQ/L (ref 3.5–5.1)
RBC # BLD AUTO: 4.29 M/MM3 (ref 4.1–5.4)
SODIUM SERPL-SCNC: 138 MEQ/L (ref 136–145)
WBC # BLD AUTO: 8.1 K/MM3 (ref 4–10.5)

## 2017-05-11 RX ADMIN — OXYCODONE HYDROCHLORIDE AND ACETAMINOPHEN PRN TAB: 10; 325 TABLET ORAL at 08:04

## 2017-05-11 RX ADMIN — GABAPENTIN SCH MG: 300 CAPSULE ORAL at 14:31

## 2017-05-11 RX ADMIN — OXYBUTYNIN CHLORIDE SCH MG: 5 TABLET ORAL at 22:36

## 2017-05-11 RX ADMIN — GABAPENTIN SCH MG: 300 CAPSULE ORAL at 22:36

## 2017-05-11 RX ADMIN — INSULIN ASPART PRN UNIT: 100 INJECTION, SOLUTION INTRAVENOUS; SUBCUTANEOUS at 17:53

## 2017-05-11 RX ADMIN — BUMETANIDE SCH MG: 1 TABLET ORAL at 09:28

## 2017-05-11 RX ADMIN — NYSTATIN SCH GM: 100000 POWDER TOPICAL at 09:37

## 2017-05-11 RX ADMIN — SIMVASTATIN SCH MG: 10 TABLET, FILM COATED ORAL at 09:29

## 2017-05-11 RX ADMIN — LOSARTAN POTASSIUM SCH MG: 50 TABLET, FILM COATED ORAL at 22:36

## 2017-05-11 RX ADMIN — OXYCODONE HYDROCHLORIDE AND ACETAMINOPHEN PRN TAB: 10; 325 TABLET ORAL at 14:31

## 2017-05-11 RX ADMIN — LEVOFLOXACIN SCH MLS/HR: 5 INJECTION, SOLUTION INTRAVENOUS at 08:54

## 2017-05-11 RX ADMIN — POLYETHYLENE GLYCOL 3350 SCH GM: 17 POWDER, FOR SOLUTION ORAL at 09:28

## 2017-05-11 RX ADMIN — GABAPENTIN SCH MG: 300 CAPSULE ORAL at 09:28

## 2017-05-11 RX ADMIN — VERAPAMIL HYDROCHLORIDE SCH MG: 180 TABLET, FILM COATED, EXTENDED RELEASE ORAL at 09:29

## 2017-05-11 RX ADMIN — CLONIDINE HYDROCHLORIDE SCH MG: 0.1 TABLET ORAL at 22:36

## 2017-05-11 RX ADMIN — ASPIRIN SCH MG: 325 TABLET, COATED ORAL at 09:28

## 2017-05-11 RX ADMIN — FLUTICASONE PROPIONATE AND SALMETEROL XINAFOATE SCH PUFF: 230; 21 AEROSOL, METERED RESPIRATORY (INHALATION) at 06:44

## 2017-05-11 RX ADMIN — INSULIN ASPART PRN UNIT: 100 INJECTION, SOLUTION INTRAVENOUS; SUBCUTANEOUS at 11:47

## 2017-05-11 RX ADMIN — OXYBUTYNIN CHLORIDE SCH MG: 5 TABLET ORAL at 09:28

## 2017-05-11 RX ADMIN — INSULIN ASPART PRN UNIT: 100 INJECTION, SOLUTION INTRAVENOUS; SUBCUTANEOUS at 08:50

## 2017-05-11 RX ADMIN — FLUTICASONE PROPIONATE AND SALMETEROL XINAFOATE SCH PUFF: 230; 21 AEROSOL, METERED RESPIRATORY (INHALATION) at 19:46

## 2017-05-11 RX ADMIN — SODIUM CHLORIDE SCH MLS/HR: 9 INJECTION, SOLUTION INTRAVENOUS at 05:09

## 2017-05-11 RX ADMIN — BISACODYL SCH MG: 5 TABLET, COATED ORAL at 22:37

## 2017-05-11 RX ADMIN — VERAPAMIL HYDROCHLORIDE SCH MG: 180 TABLET, FILM COATED, EXTENDED RELEASE ORAL at 22:36

## 2017-05-11 RX ADMIN — CLONIDINE HYDROCHLORIDE SCH MG: 0.1 TABLET ORAL at 14:32

## 2017-05-11 RX ADMIN — CLONIDINE HYDROCHLORIDE SCH MG: 0.1 TABLET ORAL at 09:28

## 2017-05-11 RX ADMIN — ENOXAPARIN SODIUM SCH MG: 100 INJECTION SUBCUTANEOUS at 09:29

## 2017-05-11 RX ADMIN — NYSTATIN SCH GM: 100000 POWDER TOPICAL at 23:02

## 2017-05-11 RX ADMIN — INSULIN ASPART PRN UNIT: 100 INJECTION, SOLUTION INTRAVENOUS; SUBCUTANEOUS at 22:37

## 2017-05-11 RX ADMIN — OXYCODONE HYDROCHLORIDE AND ACETAMINOPHEN PRN TAB: 10; 325 TABLET ORAL at 22:36

## 2017-05-11 NOTE — PCM.NOTE
Date and Time: 05/11/17  1327





Subjective Assessment: 





doing better this am, bowels moved yesterday. leg is improving, still has some 

drainage but pain and swelling improving.





Objective Exam


General Appearance: obese


Skin Exam: normal color


Respiratory Exam: normal breath sounds, lungs clear, No respiratory distress


Cardiovascular Exam: regular rate/rhythm, normal heart sounds


Gastrointestinal/Abdomen Exam: soft, No tenderness, No mass


Extremity Exam: other (right lower leg improving, less erythema, less drainage 

and open areas appear to be granulating)





OBJECTIVE DATA


Vital Signs: 


 Vital Signs - 24 hr











  Temp Pulse Resp BP Pulse Ox


 


 05/11/17 11:24  97.5 F  60  20  125/60  96


 


 05/11/17 06:51  97.5 F  60  20  143/74  95


 


 05/11/17 06:45   57 L  18   94 L


 


 05/11/17 04:00  97.8 F  63  18  112/58  96


 


 05/11/17 00:00  97.7 F  58 L  18  123/59  97


 


 05/10/17 20:00  97.9 F  66  14  114/59  92 L


 


 05/10/17 19:35   56 L  18   97


 


 05/10/17 16:17  98 F  65  20  98/48  95








 Oxygen-Last 24 hours











O2 Percentage                  2 Liters = 28%


 


O2 Percentage                  2 Liters = 28%


 


O2 Percentage                  2 Liters = 28%


 


O2 Percentage                  2 Liters = 28%


 


O2 Percentage                  2 Liters = 28%











 Pain Assessment - Last Documented











Pain Intensity                 7


 


Pain Scale Used                0-10 Pain Scale











Intake and Output: 


 Intake & Output











 05/09/17 05/10/17 05/11/17 05/12/17





 11:59 11:59 11:59 11:59


 


Intake Total 1960 2780 1160 


 


Output Total 1900 800 600 


 


Balance 60 1980 560 


 


Weight 192.913 kg 193.503 kg 193.865 kg 











Lab Results: 


 Accuchecks











Date                           05/11/17


 


Date                           05/11/17


 


Date                           05/10/17


 


Time                           11:52


 


Time                           07:30


 


Accucheck Value:               347


 


Accucheck Value:               294


 


Accucheck Value:               376


 


Accucheck Value:               307











 Lab Results-Last 24 Hours











  05/11/17 05/11/17 Range/Units





  05:22 05:22 


 


WBC  8.1   (4.0-10.5)  K/mm3


 


RBC  4.29   (4.1-5.4)  M/mm3


 


Hgb  11.7 L   (12.0-16.0)  gm/dl


 


Hct  37.9   (35-47)  %


 


MCV  88.3   ()  fl


 


MCH  27.2   (26-32)  pg


 


MCHC  30.9 L   (32-36)  g/dl


 


RDW  15.2 H   (11.5-14.0)  %


 


Plt Count  156   (150-450)  K/mm3


 


MPV  10.7 H   (6-9.5)  fl


 


Gran %  53.3   (36.0-66.0)  %


 


Lymphocytes %  30.1   (24.0-44.0)  %


 


Monocytes %  8.0   (0.0-12.0)  %


 


Eosinophils %  8.4 H   (0.00-5.0)  %


 


Basophils %  0.2   (0.0-0.4)  %


 


Basophils #  0.02   (0-0.4)  


 


Sodium   138  (136-145)  mEq/L


 


Potassium   4.5  (3.5-5.1)  mEq/L


 


Chloride   101  ()  mEq/L


 


Carbon Dioxide   30.2  (21-32)  mEq/L


 


Anion Gap   11.2  (5-15)  MEQ/L


 


BUN   21 H  (9-20)  mg/dL


 


Creatinine   1.22  (0.55-1.30)  mg/dl


 


Estimated GFR   46  ML/MIN


 


Glucose   294 H  ()  MG/DL


 


Calcium   9.7  (8.5-10.1)  mg/dL











Multi-Disciplinary Progress Notes: 


 Multi-Disciplinary Progress Notes





05/11/17 12:36 Nutrition Note by Lindsey Delgado





F/u Note:


Note regular diet continues with % PO intake. Pt with hx DM. glu 294--

some reduction. Recommend change to ADA diet. Both goals being met: ongoing. 

Will monitor, f/u prn. LArminda Delgado MSRDCD





Initialized on 05/11/17 12:36 - END OF NOTE








05/11/17 10:54 Case Management Note by RADHA PATE


SPOKE WITH PT TODAY AND SHE STATES SHE FEELS WONDERFUL. PT HAS [facility] AND WILL CONTINUE THIS SERVICE WHEN DISCHARGED. SHE 

ALSO HAS HOME OXYGEN WITH [facility] AND CHECKS HER BLOOD SUGAR FOUR TIMES A DAY. 

PT STATES SHE HAS A WALKER AT HOME AND THAT HER NEPHEW LIVES WITH HER AND WILL 

HELP WITH ANY OTHER NEEDS. WILL CONTINUE TO MONITOR FOR ANY OTHER POSSIBLE 

NEEDS AT DISCHARGE. 





Initialized on 05/11/17 10:54 - END OF NOTE








05/10/17 17:08 Physical Therapy Note by Jeanna Alex


CONSISTENT IMPROVEMENT IN CELLULITIS SYMPTOMS RIGHT LEG WITH PALLIATIVE CARE : 

HIBICLENS SOAK, BARRIER OINTMENT TOPICAL, SUREPRESS + GAUZE PINO LAYERS WITH 

TUBIGRIP LIGHT COMPRESSION LAYER. TODAY TUBIGRIP STOCKINETTE SIZE REDUCED TO 

SIZE G FROM SIZE J. SEROUS SEEPING DRAINAGE DIMINISHED TO MODERATE. EPIDERMIS 

SLOUGHING DIMINISHED; 3-4 SMALL RAW AREAS REMAIN ANTERIOR LOWER CALF. IV 

ANTIBIOTICS CONTINUE.





Initialized on 05/10/17 17:08 - END OF NOTE

















Assessment/Plan


(1) Cellulitis


Current Visit: Yes   Status: Acute   


Qualifiers: 


   Site of cellulitis: extremity   Site of cellulitis of extremity: lower 

extremity   Laterality: right   Qualified Code(s): L03.115 - Cellulitis of 

right lower limb   


Assessment & Plan: 


cont vanc/levaquin. much improved, possibly home on 5/12 with home health for 

wound care


Code(s): L03.90 - CELLULITIS, UNSPECIFIED   





(2) Diabetes mellitus


Current Visit: Yes   Status: Acute   


Qualifiers: 


   Diabetes mellitus type: type 2   Diabetes mellitus complication status: 

without complication   Diabetes mellitus long term insulin use: unspecified 

long term insulin use status   Qualified Code(s): E11.9 - Type 2 diabetes 

mellitus without complications   


Code(s): E11.9 - TYPE 2 DIABETES MELLITUS WITHOUT COMPLICATIONS   





(3) Renal insufficiency


Current Visit: Yes   Status: Acute   





(4) Morbid obesity


Current Visit: Yes   Status: Chronic   


Qualifiers: 


   Obesity type: due to excess calories   Qualified Code(s): E66.01 - Morbid (

severe) obesity due to excess calories   


Code(s): E66.01 - MORBID (SEVERE) OBESITY DUE TO EXCESS CALORIES   





(5) Constipation


Current Visit: Yes   Status: Acute   


Qualifiers: 


   Constipation type: slow transit constipation   Qualified Code(s): K59.01 - 

Slow transit constipation   


Code(s): K59.00 - CONSTIPATION, UNSPECIFIED

## 2017-05-12 VITALS — HEART RATE: 68 BPM | DIASTOLIC BLOOD PRESSURE: 55 MMHG | SYSTOLIC BLOOD PRESSURE: 108 MMHG | OXYGEN SATURATION: 98 %

## 2017-05-12 LAB
ANION GAP SERPL CALC-SCNC: 7.7 MEQ/L (ref 5–15)
BUN SERPL-MCNC: 19 MG/DL (ref 9–20)
CHLORIDE SERPL-SCNC: 102 MEQ/L (ref 98–107)
CO2 SERPL-SCNC: 32.4 MEQ/L (ref 21–32)
GLUCOSE SERPL-MCNC: 254 MG/DL (ref 70–110)
POTASSIUM SERPLBLD-SCNC: 4.3 MEQ/L (ref 3.5–5.1)
SODIUM SERPL-SCNC: 138 MEQ/L (ref 136–145)

## 2017-05-12 RX ADMIN — CLONIDINE HYDROCHLORIDE SCH MG: 0.1 TABLET ORAL at 07:54

## 2017-05-12 RX ADMIN — LEVOFLOXACIN SCH MLS/HR: 5 INJECTION, SOLUTION INTRAVENOUS at 10:34

## 2017-05-12 RX ADMIN — BUMETANIDE SCH MG: 1 TABLET ORAL at 07:53

## 2017-05-12 RX ADMIN — VERAPAMIL HYDROCHLORIDE SCH MG: 180 TABLET, FILM COATED, EXTENDED RELEASE ORAL at 07:54

## 2017-05-12 RX ADMIN — INSULIN ASPART PRN UNIT: 100 INJECTION, SOLUTION INTRAVENOUS; SUBCUTANEOUS at 12:29

## 2017-05-12 RX ADMIN — ASPIRIN SCH MG: 325 TABLET, COATED ORAL at 08:00

## 2017-05-12 RX ADMIN — OXYCODONE HYDROCHLORIDE AND ACETAMINOPHEN PRN TAB: 10; 325 TABLET ORAL at 05:49

## 2017-05-12 RX ADMIN — SODIUM CHLORIDE SCH MLS/HR: 9 INJECTION, SOLUTION INTRAVENOUS at 05:50

## 2017-05-12 RX ADMIN — ENOXAPARIN SODIUM SCH MG: 100 INJECTION SUBCUTANEOUS at 07:54

## 2017-05-12 RX ADMIN — NYSTATIN SCH GM: 100000 POWDER TOPICAL at 07:56

## 2017-05-12 RX ADMIN — GABAPENTIN SCH MG: 300 CAPSULE ORAL at 07:55

## 2017-05-12 RX ADMIN — SIMVASTATIN SCH MG: 10 TABLET, FILM COATED ORAL at 07:53

## 2017-05-12 RX ADMIN — OXYBUTYNIN CHLORIDE SCH MG: 5 TABLET ORAL at 07:55

## 2017-05-12 RX ADMIN — FLUTICASONE PROPIONATE AND SALMETEROL XINAFOATE SCH PUFF: 230; 21 AEROSOL, METERED RESPIRATORY (INHALATION) at 07:03

## 2017-05-12 RX ADMIN — POLYETHYLENE GLYCOL 3350 SCH GM: 17 POWDER, FOR SOLUTION ORAL at 07:53

## 2017-05-12 NOTE — PCM.DS
Discharge Summary


Date of Admission: 


05/05/17 15:08





Admitting Physician: 


JOHN MARTINEZ





Primary Care Provider: 


JOHN MARTINEZ








Allergies


Allergies





sulfamethoxazole [From Bactrim] Allergy (Verified 05/05/17 15:26)


 


 itching, SOB 


trimethoprim [From Bactrim] Allergy (Verified 12/30/14 21:44)


 


zinc Adverse Reaction (Mild, Verified 05/05/17 17:25)


 Rash


zinc oxide Adverse Reaction (Mild, Verified 05/05/17 17:25)


 Rash


satin tape Allergy (Uncoded 05/05/17 15:27)


 











Hospital Summary





- Hospital Course


Hospital Course: 





patient was admitted with cellulitis RLE, doing much better with abx and wound 

care. 





- Vitals & Intake/Output


Vital Signs: 





 Vital Signs











Temperature  97.9 F   05/12/17 07:58


 


Pulse Rate  68   05/12/17 07:58


 


Respiratory Rate  18   05/12/17 07:58


 


Blood Pressure  108/55   05/12/17 07:58


 


O2 Sat by Pulse Oximetry  98   05/12/17 07:58








 Oxygen-Last Documented











O2 Percentage                  2 Liters = 28%














Intake & Output: 





 Intake & Output











 05/09/17 05/10/17 05/11/17 05/12/17





 11:59 11:59 11:59 11:59


 


Intake Total 1960 2780 1160 1800


 


Output Total 1900 800 600 


 


Balance 60 9221 541 1940


 


Weight 192.913 kg 193.503 kg 193.865 kg 195.226 kg














- Lab


Result Diagrams: 


 05/11/17 05:22





 05/12/17 05:38


Lab Results-Last 24 Hrs: 





 Accuchecks











Date                           05/12/17


 


Date                           05/11/17


 


Date                           05/11/17


 


Date                           05/11/17


 


Time                           07:30


 


Time                           22:00


 


Time                           16:30


 


Time                           11:52


 


Accucheck Value:               254


 


Accucheck Value:               292


 


Accucheck Value:               308


 


Accucheck Value:               347











 Lab Results-Last 24 Hours











  05/12/17 Range/Units





  05:38 


 


Sodium  138  (136-145)  mEq/L


 


Potassium  4.3  (3.5-5.1)  mEq/L


 


Chloride  102  ()  mEq/L


 


Carbon Dioxide  32.4 H  (21-32)  mEq/L


 


Anion Gap  7.7  (5-15)  MEQ/L


 


BUN  19  (9-20)  mg/dL


 


Creatinine  1.16  (0.55-1.30)  mg/dl


 


Estimated GFR  49  ML/MIN


 


Glucose  254 H  ()  MG/DL


 


Calcium  9.4  (8.5-10.1)  mg/dL











Micro Results-Entire Visit: 





 Microbiology











 05/05/17 16:24  - Final





 Blood    Not Reportable





 Blood Culture - Final





    NO GROWTH


 


 05/05/17 16:15  - Final





 Blood    Not Reportable





 Blood Culture - Final





    NO GROWTH


 


 05/05/17 16:16 Gram Stain - Final





 Leg - Right Lower Wound Culture - Final





    ORGANISMS ISOLATED ARE CONSISTENT WITH NORMAL SKIN DURAN





    MODERATE GROWTH, NO PREDOMINANT ORGANISM








 Accuchecks











Date                           05/12/17


 


Date                           05/11/17


 


Date                           05/11/17


 


Date                           05/11/17


 


Time                           07:30


 


Time                           22:00


 


Time                           16:30


 


Time                           11:52


 


Accucheck Value:               254


 


Accucheck Value:               292


 


Accucheck Value:               308


 


Accucheck Value:               347

















- Procedures and Test


Procedures and Tests throughout Hospitalization: 





 Therapy Orders & Screens





05/05/17 15:47


Respiratory Nebulizer 


   Comment: 


   Diagnosis: cellulitis right lower extremity


   Name of medication?: duonebs 2.5/0.5 mg every 6 hours prn





05/05/17 15:50


PT Eval & Treat (MD Order) ROUTINE 


   Evaluate: Yes


   Treat: Yes


   Reason for Eval:: for wound care


   Diagnosis: cellulitis right lower extremity





05/05/17 16:45


OT Screen per Nursing Assess ONCE 


   Comment: Protocol Order


   Physician Instructions: Greater than 3 points order OT Admission Screening


   Reason For Exam: Triggered on Admission


   Diagnosis: Cellulitis RLE


   Open Wound/Cellutlitis/Pressure Ulcers: Yes


   Acute Fx/ORIF/Change in wt bearing status: Yes


   Severe MUSCULOSKELETAL pain: No


   ADL Dysfunction: No


   Acute CVA w/Hemiparesis/Hemiplegia: No


   Decreased Functional Mobility/Strength: No


   Sprain/Strain: No


   Acute Post-op Mobility Dysfunction: No


   Total Points: 10


PT Screen per Nursing Assess ONCE 


   Comment: Protocol Order


   Physician Instructions: Greater than 3 points order PT Admission Screenin


   Reason For Exam: Triggered on Admission


   Diagnosis: Cellulitis RLE


   Open Wound/Cellutlitis/Pressure Ulcers: Yes


   Acute Fx/ORIF/Change in wt bearing status: Yes


   Severe MUSCULOSKELETAL pain: No


   ADL Dysfunction: No


   Acute CVA w/Hemiparesis/Hemiplegia: No


   Decreased Functional Mobility/Strength: No


   Sprain/Strain: No


   Acute Post-op Mobility Dysfunction: No


   Total Points: 10


RT Screen per Nursing Assess ONCE 


   Comment: Protocol Order


   Physician Instructions: Greater than 3 points order RT Admission Screen


   Reason For Exam: Triggered on Admission


   Diagnosis: Cellulitis RLE


   Diagnosis: Cellulitis RLE


   Pneumonia: No


   Home O2: Yes


   Asthma: No


   CHF: No


   Home CPAP/BIPAP: Yes


   Home Nebs/MDI: Yes


   Total Points: 15





05/05/17 19:00


Respiratory MDI Q12H 


   Comment: 


   Diagnosis: Cellulitis RLE





05/05/17 20:35


BiPap/CPAP Assessment ROUTINE 


   Comment: CPAP AT NIGHT, [id number] WITH 2L O2


   Diagnosis: Cellulitis RLE


Oxygen NASAL CANNULA 2 lpm 


   Comment: PT USES 2L AT HOME 24/7, AND 2L AT NIGHT WITH CPAP


   Diagnosis: Cellulitis RLE














Discharge Exam


General Appearance: no apparent distress, alert


Skin Exam: normal color, warm, dry


Respiratory Exam: normal breath sounds, lungs clear, No respiratory distress


Cardiovascular Exam: regular rate/rhythm, normal heart sounds


Gastrointestinal/Abdomen Exam: soft, No tenderness, No mass


Extremity Exam: other (right lower extremity open areas healing, erythma 

improved)





Final Diagnosis/Problem List





- Final Discharge Diagnosis/Problem


(1) Cellulitis


Current Visit: Yes   Status: Acute   





(2) Diabetes mellitus


Current Visit: Yes   Status: Acute   





(3) Renal insufficiency


Current Visit: Yes   Status: Acute   





(4) Morbid obesity


Current Visit: Yes   Status: Chronic   





(5) Constipation


Current Visit: Yes   Status: Acute   





- Discharge


Disposition: Home, Self-Care


Condition: Stable


Prescriptions: 


New


   Levofloxacin [Levaquin] 500 mg PO DAILY #7 tablet





Continue


   Insulin Detemir [Levemir] 40 units SQ HS


   Nitroglycerin [Nitroglycerin Patch] 1 patch TOP DAILY


   Clonidine HCl 0.1 mg*** [Catapres 0.1 MG***] 0.1 mg PO TID


   Pravastatin Sodium 20 mg PO DAILY


   Losartan Potassium 100 mg PO QHS


   Verapamil HCl Sr 180 mg*** [Isoptin Sr 180Mg***] 180 mg PO BID


   Bumetanide [Bumex] 2 mg PO DAILY


   Aspirin  mg*** [Ecotrin 325 MG***] 325 mg PO DAILY


   Bisacodyl 5 mg*** [Dulcolax 5 mg***] 20 mg PO QHS


   Nystatin 1 applic TOP BID


   Hydroxyzine HCl 25 mg*** [Atarax 25 mg***] 25 mg PO TIDPRN PRN


     PRN Reason: Itching


   Oxybutynin Chloride 5 mg PO BID


   Gabapentin [Neurontin] 300 mg PO TID


   Insulin Aspart** [NovoLOG Insulin**] 0 units SQ UD


   Hydrocodone Bit/Acetaminophen [Vicodin 5-500 Tablet] 1 tablet PO Q4HPRN PRN #

150 tablet


     PRN Reason: Pain


Follow up with: 


JOHN MARTINEZ MD [Primary Care Provider] - 1 Week


Forms:  Patient Portal Information

## 2023-01-20 ENCOUNTER — HOSPITAL ENCOUNTER (INPATIENT)
Dept: HOSPITAL 33 - ED | Age: 78
LOS: 2 days | Discharge: HOME | DRG: 872 | End: 2023-01-22
Attending: FAMILY MEDICINE | Admitting: INTERNAL MEDICINE
Payer: MEDICARE

## 2023-01-20 DIAGNOSIS — Z20.828: ICD-10-CM

## 2023-01-20 DIAGNOSIS — E11.9: ICD-10-CM

## 2023-01-20 DIAGNOSIS — I95.9: ICD-10-CM

## 2023-01-20 DIAGNOSIS — Z12.31: ICD-10-CM

## 2023-01-20 DIAGNOSIS — K57.32: ICD-10-CM

## 2023-01-20 DIAGNOSIS — I10: ICD-10-CM

## 2023-01-20 DIAGNOSIS — E78.5: ICD-10-CM

## 2023-01-20 DIAGNOSIS — R41.82: ICD-10-CM

## 2023-01-20 DIAGNOSIS — A41.9: Primary | ICD-10-CM

## 2023-01-20 DIAGNOSIS — Z79.899: ICD-10-CM

## 2023-01-20 LAB
ALBUMIN SERPL-MCNC: 4.5 G/DL (ref 3.5–5)
ALP SERPL-CCNC: 133 U/L (ref 38–126)
ALT SERPL-CCNC: 32 U/L (ref 0–35)
AMPHETAMINES UR QL: NEGATIVE
ANION GAP SERPL CALC-SCNC: 19.7 MEQ/L (ref 5–15)
ANION GAP SERPL CALC-SCNC: 21.1 MEQ/L (ref 5–15)
AST SERPL QL: 59 U/L (ref 14–36)
BACTERIA UR CULT: (no result)
BARBITURATES UR QL: NEGATIVE
BASOPHILS # BLD AUTO: 0.04 X10^3/UL (ref 0–0.4)
BASOPHILS NFR BLD AUTO: 0.2 % (ref 0–0.4)
BENZODIAZ UR QL SCN: NEGATIVE
BILIRUB BLD-MCNC: 1.1 MG/DL (ref 0.2–1.3)
BNP SERPL-MCNC: 911 PG/ML (ref 0–1800)
BUN SERPL-MCNC: 24 MG/DL (ref 7–17)
BUN SERPL-MCNC: 26 MG/DL (ref 7–17)
CALCIUM SPEC-MCNC: 7.9 MG/DL (ref 8.4–10.2)
CALCIUM SPEC-MCNC: 9.2 MG/DL (ref 8.4–10.2)
CHLORIDE SERPL-SCNC: 105 MMOL/L (ref 98–107)
CHLORIDE SERPL-SCNC: 109 MMOL/L (ref 98–107)
CO2 SERPL-SCNC: 13 MMOL/L (ref 22–30)
CO2 SERPL-SCNC: 18 MMOL/L (ref 22–30)
COCAINE UR QL SCN: NEGATIVE
CREAT SERPL-MCNC: 1.11 MG/DL (ref 0.52–1.04)
CREAT SERPL-MCNC: 1.22 MG/DL (ref 0.52–1.04)
EOSINOPHIL # BLD AUTO: 0.03 X10^3/UL (ref 0–0.5)
FLUAV AG NPH QL IA: NEGATIVE
FLUBV AG NPH QL IA: NEGATIVE
GFR SERPLBLD BASED ON 1.73 SQ M-ARVRAT: 45.3 ML/MIN
GFR SERPLBLD BASED ON 1.73 SQ M-ARVRAT: 50.5 ML/MIN
GLUCOSE SERPL-MCNC: 201 MG/DL (ref 74–106)
GLUCOSE SERPL-MCNC: 275 MG/DL (ref 74–106)
GLUCOSE UR-MCNC: >=1000 MG/DL
HCT VFR BLD AUTO: 48.4 % (ref 35–47)
HGB BLD-MCNC: 14.8 G/DL (ref 12–16)
IMM GRANULOCYTES # BLD: 0.11 X10^3U/L (ref 0–0.03)
IMM GRANULOCYTES NFR BLD: 0.5 % (ref 0–0.4)
LYMPHOCYTES # SPEC AUTO: 2.26 X10^3/UL (ref 1–4.6)
MCH RBC QN AUTO: 28.6 PG (ref 26–32)
MCHC RBC AUTO-ENTMCNC: 30.6 G/DL (ref 32–36)
METHADONE UR QL: NEGATIVE
MONOCYTES # BLD AUTO: 0.74 X10^3/UL (ref 0–1.3)
NRBC # BLD AUTO: 0 X10^3U/L (ref 0–0.01)
NRBC BLD AUTO-RTO: 0 % (ref 0–0.1)
OPIATES UR QL: NEGATIVE
PCP UR QL CFM>20 NG/ML: NEGATIVE
PLATELET # BLD AUTO: 183 X10^3/UL (ref 150–450)
POTASSIUM SERPLBLD-SCNC: 3.7 MMOL/L (ref 3.5–5.1)
POTASSIUM SERPLBLD-SCNC: 4.1 MMOL/L (ref 3.5–5.1)
PROT SERPL-MCNC: 7.8 G/DL (ref 6.3–8.2)
PROT UR STRIP-MCNC: 30 MG/DL
RBC # BLD AUTO: 5.17 X10^6/UL (ref 4.1–5.4)
RBC # URNS HPF: (no result) /HPF (ref 0–5)
RSV AG SPEC QL IA: NEGATIVE
SARS-COV-2 AG RESP QL IA.RAPID: NEGATIVE
SODIUM SERPL-SCNC: 138 MMOL/L (ref 137–145)
SODIUM SERPL-SCNC: 141 MMOL/L (ref 137–145)
THC UR QL SCN: NEGATIVE
WBC # BLD AUTO: 21.3 X10^3/UL (ref 4–10.5)
WBC URNS QL MICRO: (no result) /HPF (ref 0–5)

## 2023-01-20 PROCEDURE — 82140 ASSAY OF AMMONIA: CPT

## 2023-01-20 PROCEDURE — 51702 INSERT TEMP BLADDER CATH: CPT

## 2023-01-20 PROCEDURE — 84134 ASSAY OF PREALBUMIN: CPT

## 2023-01-20 PROCEDURE — 94640 AIRWAY INHALATION TREATMENT: CPT

## 2023-01-20 PROCEDURE — 87186 SC STD MICRODIL/AGAR DIL: CPT

## 2023-01-20 PROCEDURE — 93041 RHYTHM ECG TRACING: CPT

## 2023-01-20 PROCEDURE — 96365 THER/PROPH/DIAG IV INF INIT: CPT

## 2023-01-20 PROCEDURE — 70450 CT HEAD/BRAIN W/O DYE: CPT

## 2023-01-20 PROCEDURE — 80307 DRUG TEST PRSMV CHEM ANLYZR: CPT

## 2023-01-20 PROCEDURE — 87040 BLOOD CULTURE FOR BACTERIA: CPT

## 2023-01-20 PROCEDURE — 87493 C DIFF AMPLIFIED PROBE: CPT

## 2023-01-20 PROCEDURE — 81001 URINALYSIS AUTO W/SCOPE: CPT

## 2023-01-20 PROCEDURE — 74176 CT ABD & PELVIS W/O CONTRAST: CPT

## 2023-01-20 PROCEDURE — 85379 FIBRIN DEGRADATION QUANT: CPT

## 2023-01-20 PROCEDURE — 77063 BREAST TOMOSYNTHESIS BI: CPT

## 2023-01-20 PROCEDURE — 84484 ASSAY OF TROPONIN QUANT: CPT

## 2023-01-20 PROCEDURE — 99291 CRITICAL CARE FIRST HOUR: CPT

## 2023-01-20 PROCEDURE — 87077 CULTURE AEROBIC IDENTIFY: CPT

## 2023-01-20 PROCEDURE — 96360 HYDRATION IV INFUSION INIT: CPT

## 2023-01-20 PROCEDURE — 36000 PLACE NEEDLE IN VEIN: CPT

## 2023-01-20 PROCEDURE — 80048 BASIC METABOLIC PNL TOTAL CA: CPT

## 2023-01-20 PROCEDURE — 93005 ELECTROCARDIOGRAM TRACING: CPT

## 2023-01-20 PROCEDURE — 71260 CT THORAX DX C+: CPT

## 2023-01-20 PROCEDURE — 96361 HYDRATE IV INFUSION ADD-ON: CPT

## 2023-01-20 PROCEDURE — 82947 ASSAY GLUCOSE BLOOD QUANT: CPT

## 2023-01-20 PROCEDURE — 83605 ASSAY OF LACTIC ACID: CPT

## 2023-01-20 PROCEDURE — 99285 EMERGENCY DEPT VISIT HI MDM: CPT

## 2023-01-20 PROCEDURE — 85025 COMPLETE CBC W/AUTO DIFF WBC: CPT

## 2023-01-20 PROCEDURE — 36415 COLL VENOUS BLD VENIPUNCTURE: CPT

## 2023-01-20 PROCEDURE — 0241U: CPT

## 2023-01-20 PROCEDURE — 83880 ASSAY OF NATRIURETIC PEPTIDE: CPT

## 2023-01-20 PROCEDURE — 96375 TX/PRO/DX INJ NEW DRUG ADDON: CPT

## 2023-01-20 PROCEDURE — 83735 ASSAY OF MAGNESIUM: CPT

## 2023-01-20 PROCEDURE — 96374 THER/PROPH/DIAG INJ IV PUSH: CPT

## 2023-01-20 PROCEDURE — 77067 SCR MAMMO BI INCL CAD: CPT

## 2023-01-20 PROCEDURE — 87086 URINE CULTURE/COLONY COUNT: CPT

## 2023-01-20 PROCEDURE — 80053 COMPREHEN METABOLIC PANEL: CPT

## 2023-01-20 PROCEDURE — 94760 N-INVAS EAR/PLS OXIMETRY 1: CPT

## 2023-01-20 RX ADMIN — NOREPINEPHRINE BITARTRATE PRN MLS/HR: 8 INJECTION, SOLUTION INTRAVENOUS at 22:40

## 2023-01-20 NOTE — ERPHSYRPT
- History of Present Illness


Time Seen by Provider: 01/20/23 16:38


Source: patient, EMS, old records


Exam Limitations: clinical condition


Physician History: 





This is a 77-year-old morbidly obese female patient of Dr. Delong and 

cardiologist Dr. Smith who is transported to the emergency department by EMS 

because of "passing out.  The patient arrives awake and alert for the most part.

 She may be mildly lethargic.  However she wakes up quickly and can stay awake 

and alert while answering questions.  Apparently, she was at home and doing well

this morning.  She even drove her family member to appointments.  However, this 

afternoon she started to feel ill and felt like she was going to vomit and have 

diarrhea.  She did not vomit and she did not have diarrhea.  However she "passed

out" on the toilet.  The service was contacted.  She was very lethargic.  She 

arrives to the emergency department without chest pain and denies shortness of 

breath.  Her blood sugar level in the emergency department is 227.  Patient has 

a history of hyperlipidemia, hypertension, insulin-dependent diabetes, sleep 

apnea and chronic angina.  She also denies abdominal pain at this time.


Timing/Duration: today


Severity: mild


Associated Symptoms: nausea (To moderate), malaise, weakness, No vomiting, No 

abdominal pain, No shortness of breath, No chest pain


Allergies/Adverse Reactions: 








sulfamethoxazole [From Bactrim] Allergy (Verified 05/05/17 15:26)


   


   itching, SOB 


trimethoprim [From Bactrim] Allergy (Verified 12/30/14 21:44)


   


zinc Adverse Reaction (Mild, Verified 05/05/17 17:25)


   Rash


zinc oxide Adverse Reaction (Mild, Verified 05/05/17 17:25)


   Rash


satin tape Allergy (Uncoded 05/05/17 15:27)


   





Home Medications: 








Clonidine HCl 0.1 mg*** [Clonidine 0.1 mg Tablet] 0.1 mg PO TID 09/05/14 

[History]


Insulin Detemir [Levemir] 40 units SQ HS 09/05/14 [History]


Nitroglycerin [Nitroglycerin Patch] 1 patch TOP DAILY 09/05/14 [History]


Pravastatin Sodium 20 mg PO DAILY 09/05/14 [History]


Aspirin  mg*** [Ecotrin 325 MG***] 325 mg PO DAILY 10/15/14 [History]


Bumetanide [Bumex] 2 mg PO DAILY 10/15/14 [History]


Losartan Potassium 100 mg PO QHS 10/15/14 [History]


Verapamil HCl Sr [Isoptin Sr] 180 mg PO BID 10/15/14 [History]


Bisacodyl 5 mg*** [Dulcolax 5 mg***] 20 mg PO QHS 05/05/17 [History]


Gabapentin [Neurontin] 300 mg PO TID 05/05/17 [History]


Hydroxyzine HCl 25 mg*** [Atarax 25 mg***] 25 mg PO TIDPRN PRN 05/05/17 

[History]


Insulin Aspart** [NovoLOG Insulin**] 0 units SQ UD 05/05/17 [History]


Nystatin 1 applic TOP BID 05/05/17 [History]


Oxybutynin Chloride 5 mg PO BID 05/05/17 [History]





Hx Tetanus, Diphtheria Vaccination/Date Given: No


Hx Influenza Vaccination/Date Given:  (2013)


Hx Pneumococcal Vaccination/Date Given: No





Travel Risk





- International Travel


Have you traveled outside of the country in past 3 weeks: No





- Coronavirus Screening


Are you exhibiting any of the following symptoms?: Yes


Symptoms: Vomiting/Diarrhea, Headaches/Body Aches/Fatigue


Close contact with a COVID-19 positive Pt in past 14-21 Days: No





- Review of Systems


Constitutional: Weakness


Eyes: No Symptoms


Ears, Nose, & Throat: No Symptoms


Respiratory: No Symptoms


Cardiac: No Symptoms


Abdominal/Gastrointestinal: Nausea, Vomiting


Genitourinary Symptoms: No Symptoms


Musculoskeletal: No Symptoms


Skin: No Symptoms


Neurological: No Symptoms


Psychological: No Symptoms


Endocrine: No Symptoms


Hematologic/Lymphatic: No Symptoms





- Past Medical History


Pertinent Past Medical History: Yes


Neurological History: Peripheral Neuropathy


ENT History: No Pertinent History


Cardiac History: Angina, Hypertension


Respiratory History: Sleep Apnea


Endocrine Medical History: Diabetes Type II


Musculoskeletal History: Osteoarthritis


GI Medical History: No Pertinent History


 History: No Pertinent History


Psycho-Social History: No Pertinent History


Female Reproductive Disorders: No Pertinent History


Other Medical History: GASTRIC SLEEVE 12/2018, LOSS  LBS, PAST LEG WOUNDS.





- Past Surgical History


Past Surgical History: Yes


Neuro Surgical History: No Pertinent History


Cardiac: No Pertinent History


Respiratory: No Pertinent History


Gastrointestinal: Appendectomy


Genitourinary: No Pertinent History


Musculoskeletal: Other


Female Surgical History: Hysterectomy, Other


Other Surgical History: SEVERAL D&C'S.  RIGHT HEAL SPUR removed





- Social History


Smoking Status: Never smoker


Exposure to second hand smoke: Yes


Drug Use: none


Patient Lives Alone: No





- Nursing Vital Signs


Nursing Vital Signs: 


                               Initial Vital Signs











Pulse Rate  95 H  01/20/23 16:37


 


Respiratory Rate  22   01/20/23 16:37


 


Blood Pressure  96/80   01/20/23 16:37


 


O2 Sat by Pulse Oximetry  97   01/20/23 16:37








                                   Pain Scale











Pain Intensity                 2

















- Physical Exam


General Appearance: no apparent distress, alert, obese


Eye Exam: PERRL/EOMI, eyes nml inspection


Ears, Nose, Throat Exam: normal ENT inspection, moist mucous membranes


Neck Exam: normal inspection, non-tender, supple, full range of motion


Respiratory Exam: normal breath sounds, lungs clear, airway intact, No chest 

tenderness, No respiratory distress


Cardiovascular Exam: regular rate/rhythm, normal heart sounds, normal peripheral

 pulses


Gastrointestinal/Abdomen Exam: soft, normal bowel sounds, No tenderness


Pelvic Exam: not done


Rectal Exam: not done


Back Exam: normal inspection, normal range of motion, No CVA tenderness


Extremity Exam: normal range of motion, pelvis stable, pedal edema (Bilateral 

feet and ankles), other (Significant chronic venous stasis disease bilateral 

lower extremities)


Neurologic Exam: alert, oriented x 3, cooperative, CNs II-XII nml as tested, 

normal mood/affect, sensation nml


Skin Exam: normal color, warm, dry


Lymphatic Exam: No adenopathy


**SpO2 Interpretation**: normal


O2 Delivery: Room Air





- Course


Nursing assessment & vital signs reviewed: Yes


EKG Interpreted by Me: RATE (95), Sinus Rhythm, NORMAL AXIS, NORMAL QRS, NORMAL 

ST-T, Other (Prolonged NE interval.  No acute ischemic changes on this twelve-

lead EKG.)


Ordered Tests: 


                               Active Orders 24 hr











 Category Date Time Status


 


 Cardiac Monitor STAT Care  01/20/23 16:52 Active


 


 EKG-ER Only STAT Care  01/20/23 16:52 Active


 


 Agrawal [Catheter-[place] Agrawal] STAT Care  01/20/23 16:53 Active


 


 IV Insertion STAT Care  01/20/23 16:52 Active


 


 Pulse Oximetry (ED) STAT Care  01/20/23 16:52 Active


 


 CHEST WITH CONTRAST [CT] Stat Exams  01/20/23 19:42 Taken


 


 HEAD WITHOUT CONTRAST [CT] Stat Exams  01/20/23 16:53 Taken


 


 BLOOD CULTURE Stat Lab  01/20/23 18:44 Received


 


 BMP Stat Lab  01/20/23 19:00 Completed


 


 CBC W DIFF Stat Lab  01/20/23 16:40 Completed


 


 CMP Stat Lab  01/20/23 16:40 Completed


 


 CULTURE,URINE Stat Lab  01/20/23 16:40 Received


 


 D-DIMER QUANTITATIVE Stat Lab  01/20/23 16:40 Completed


 


 Lactic Acid Stat Lab  01/20/23 19:14 Completed


 


 NT PRO BNP Stat Lab  01/20/23 16:40 Completed


 


 TROPONIN Q4H Lab  01/20/23 16:40 Completed


 


 TROPONIN Q4H Lab  01/20/23 21:00 Ordered


 


 TROPONIN Q4H Lab  01/21/23 01:00 Ordered


 


 UA W/RFX UR CULTURE Stat Lab  01/20/23 16:40 Completed


 


 Urine Triage Profile Stat Lab  01/20/23 16:40 Completed


 


 Transfer Order Routine Transfer  01/20/23 Ordered








Medication Summary











Generic Name Dose Route Start Last Admin





  Trade Name Freq  PRN Reason Stop Dose Admin


 


Sodium Chloride  1,000 mls @ 100 mls/hr  01/20/23 17:00  01/20/23 17:30





  Sodium Chloride 0.9% 1000 Ml  IV  02/19/23 16:59  100 mls/hr





  .Q10H ANTHONY   Administration


 


Norepinephrine/Dextrose  8 mg in 250 mls @ 15 mls/hr  01/20/23 18:09  01/20/23 

18:24





  Norepinephrine 8 Mg/250 Ml-D5w  IV  02/19/23 18:08  8 mcg/min





  .[id number] PRN   15 mls/hr





  HYPOTENSION   Administration





  Protocol  





  8 MCG/MIN  


 


Sodium Chloride  1,000 mls @ 999 mls/hr  01/20/23 20:27  01/20/23 20:48





  Sodium Chloride 0.9% 1000 Ml  IV  01/20/23 21:27  999 mls/hr





  .Q1H1M STA   Administration














Discontinued Medications














Generic Name Dose Route Start Last Admin





  Trade Name Freq  PRN Reason Stop Dose Admin


 


Acetaminophen  650 mg  01/20/23 19:38  01/20/23 19:41





  Acetaminophen 325 Mg Tablet  PO  01/20/23 19:39  650 mg





  STAT STA   Administration


 


Acetaminophen  Confirm  01/20/23 19:41 





  Acetaminophen 325 Mg Tablet  Administered  01/20/23 19:42 





  Dose  





  650 mg  





  .ROUTE  





  .STK-MED ONE  


 


Meropenem 1 gm/ Sodium  100 mls @ 200 mls/hr  01/20/23 18:09  01/20/23 18:24





  Chloride  IV  01/20/23 18:38  200 mls/hr





  STAT ONE   Administration


 


Sodium Chloride  Confirm  01/20/23 18:15 





  Sodium Chloride 100ml Mini-Bag Plus  Administered  01/20/23 18:16 





  Dose  





  100 mls @ ud  





  IV  





  .STK-MED ONE  


 


Sodium Chloride  1,000 mls @ 999 mls/hr  01/20/23 18:59  01/20/23 20:48





  Sodium Chloride 0.9% 1000 Ml  IV  01/20/23 19:59  Infused





  .Q1H1M STA   Infusion


 


Meropenem  Confirm  01/20/23 18:14 





  Meropenem 1 Gm Vial  Administered  01/20/23 18:15 





  Dose  





  1 gm  





  IV  





  .STK-MED ONE  


 


Meropenem  Confirm  01/20/23 18:15 





  Meropenem 1 Gm Vial  Administered  01/20/23 18:16 





  Dose  





  1 gm  





  IV  





  .STK-MED ONE  











Lab/Rad Data: 


                           Laboratory Result Diagrams





                                 01/20/23 16:40 





                                 01/20/23 19:00 





                               Laboratory Results











  01/20/23 01/20/23 01/20/23 Range/Units





  19:14 19:00 17:25 


 


WBC     (4.0-10.5)  x10^3/uL


 


RBC     (4.1-5.4)  x10^6/uL


 


Hgb     (12.0-16.0)  g/dL


 


Hct     (35-47)  %


 


MCV     ()  fL


 


MCH     (26-32)  pg


 


MCHC     (32-36)  g/dL


 


RDW     (11.5-14.0)  %


 


Plt Count     (150-450)  x10^3/uL


 


MPV     (7.5-11.0)  fL


 


Gran %     (36.0-66.0)  %


 


Immature Gran % (Auto)     (0.00-0.4)  %


 


Nucleat RBC Rel Count     (0.00-0.1)  %


 


Eos # (Auto)     (0-0.5)  x10^3/uL


 


Immature Gran # (Auto)     (0.00-0.03)  x10^3u/L


 


Absolute Lymphs (auto)     (1.0-4.6)  x10^3/uL


 


Absolute Monos (auto)     (0.0-1.3)  x10^3/uL


 


Absolute Nucleated RBC     (0.00-0.01)  x10^3u/L


 


Lymphocytes %     (24.0-44.0)  %


 


Monocytes %     (0.0-12.0)  %


 


Eosinophils %     (0.00-5.0)  %


 


Basophils %     (0.0-0.4)  %


 


Absolute Granulocytes     (1.4-6.9)  x10^3/uL


 


Basophils #     (0-0.4)  x10^3/uL


 


D-Dimer     (0.0-0.50)  mg/L


 


Sodium   138   (137-145)  mmol/L


 


Potassium   4.1   (3.5-5.1)  mmol/L


 


Chloride   109 H   ()  mmol/L


 


Carbon Dioxide   13 L*   (22-30)  mmol/L


 


Anion Gap   19.7 H   (5-15)  MEQ/L


 


BUN   26 H   (7-17)  mg/dL


 


Creatinine   1.11 H   (0.52-1.04)  mg/dL


 


Estimated GFR   50.5   ML/MIN


 


Glucose   201 H   ()  mg/dL


 


Lactic Acid  6.4 H    (0.4-2.0)  


 


Calcium   7.9 L   (8.4-10.2)  mg/dL


 


Total Bilirubin     (0.2-1.3)  mg/dL


 


AST     (14-36)  U/L


 


ALT     (0-35)  U/L


 


Alkaline Phosphatase     ()  U/L


 


Ammonia     (9-30)  umol/L


 


Troponin I     (0.000-0.034)  ng/mL


 


NT-Pro-B Natriuret Pep     (0-1800)  pg/mL


 


Serum Total Protein     (6.3-8.2)  g/dL


 


Albumin     (3.5-5.0)  g/dL


 


Urine Color     (Yellow)  


 


Urine Appearance     (Clear)  


 


Urine pH     (4.6-8.0)  


 


Ur Specific Gravity     (1.005-1.030)  


 


Urine Protein     (Negative)  


 


Urine Glucose (UA)     (Negative)  mg/dL


 


Urine Ketones     (Negative)  


 


Urine Blood     (Negative)  


 


Urine Nitrite     (Negative)  


 


Urine Bilirubin     (Negative)  


 


Urine Urobilinogen     (0.2)  mg/dL


 


Ur Leukocyte Esterase     (Negative)  


 


U Hyaline Cast (Auto)     (0-2)  /LPF


 


Urine Microscopic RBC     (0-5)  /HPF


 


Urine Microscopic WBC     (0-5)  /HPF


 


Ur Epithelial Cells     (None Seen)  /HPF


 


Urine Bacteria     (None Seen)  /HPF


 


Urine Culture Reflexed     (NO)  


 


Urine Opiates Level     (NEGATIVE)  


 


Ur Methadone     (NEGATIVE)  


 


Urine Barbiturates     (NEGATIVE)  


 


Ur Phencyclidine (PCP)     (NEGATIVE)  


 


Urine Amphetamine     (NEGATIVE)  


 


U Benzodiazepine Level     (NEGATIVE)  


 


Urine Cocaine     (NEGATIVE)  


 


Urine Marijuana (THC)     (NEGATIVE)  


 


Influenza Type A Ag    NEGATIVE  (NEGATIVE)  


 


Influenza Type B Ag    NEGATIVE  (NEGATIVE)  


 


RSV (PCR)    NEGATIVE  (Negative)  


 


SARS-CoV-2 (PCR)    NEGATIVE  (NEGATIVE)  














  01/20/23 01/20/23 01/20/23 Range/Units





  16:40 16:40 16:40 


 


WBC     (4.0-10.5)  x10^3/uL


 


RBC     (4.1-5.4)  x10^6/uL


 


Hgb     (12.0-16.0)  g/dL


 


Hct     (35-47)  %


 


MCV     ()  fL


 


MCH     (26-32)  pg


 


MCHC     (32-36)  g/dL


 


RDW     (11.5-14.0)  %


 


Plt Count     (150-450)  x10^3/uL


 


MPV     (7.5-11.0)  fL


 


Gran %     (36.0-66.0)  %


 


Immature Gran % (Auto)     (0.00-0.4)  %


 


Nucleat RBC Rel Count     (0.00-0.1)  %


 


Eos # (Auto)     (0-0.5)  x10^3/uL


 


Immature Gran # (Auto)     (0.00-0.03)  x10^3u/L


 


Absolute Lymphs (auto)     (1.0-4.6)  x10^3/uL


 


Absolute Monos (auto)     (0.0-1.3)  x10^3/uL


 


Absolute Nucleated RBC     (0.00-0.01)  x10^3u/L


 


Lymphocytes %     (24.0-44.0)  %


 


Monocytes %     (0.0-12.0)  %


 


Eosinophils %     (0.00-5.0)  %


 


Basophils %     (0.0-0.4)  %


 


Absolute Granulocytes     (1.4-6.9)  x10^3/uL


 


Basophils #     (0-0.4)  x10^3/uL


 


D-Dimer     (0.0-0.50)  mg/L


 


Sodium     (137-145)  mmol/L


 


Potassium     (3.5-5.1)  mmol/L


 


Chloride     ()  mmol/L


 


Carbon Dioxide     (22-30)  mmol/L


 


Anion Gap     (5-15)  MEQ/L


 


BUN     (7-17)  mg/dL


 


Creatinine     (0.52-1.04)  mg/dL


 


Estimated GFR     ML/MIN


 


Glucose     ()  mg/dL


 


Lactic Acid     (0.4-2.0)  


 


Calcium     (8.4-10.2)  mg/dL


 


Total Bilirubin     (0.2-1.3)  mg/dL


 


AST     (14-36)  U/L


 


ALT     (0-35)  U/L


 


Alkaline Phosphatase     ()  U/L


 


Ammonia    31 H  (9-30)  umol/L


 


Troponin I     (0.000-0.034)  ng/mL


 


NT-Pro-B Natriuret Pep     (0-1800)  pg/mL


 


Serum Total Protein     (6.3-8.2)  g/dL


 


Albumin     (3.5-5.0)  g/dL


 


Urine Color   Dark Yellow A   (Yellow)  


 


Urine Appearance   Turbid A   (Clear)  


 


Urine pH   6.5   (4.6-8.0)  


 


Ur Specific Gravity   1.025   (1.005-1.030)  


 


Urine Protein   30   (Negative)  


 


Urine Glucose (UA)   >=1000 A   (Negative)  mg/dL


 


Urine Ketones   Negative   (Negative)  


 


Urine Blood   Negative   (Negative)  


 


Urine Nitrite   Negative   (Negative)  


 


Urine Bilirubin   Negative   (Negative)  


 


Urine Urobilinogen   1.0 A   (0.2)  mg/dL


 


Ur Leukocyte Esterase   Small A   (Negative)  


 


U Hyaline Cast (Auto)   NONE SEEN   (0-2)  /LPF


 


Urine Microscopic RBC   6-10 A   (0-5)  /HPF


 


Urine Microscopic WBC    A   (0-5)  /HPF


 


Ur Epithelial Cells   None Seen   (None Seen)  /HPF


 


Urine Bacteria   Many A   (None Seen)  /HPF


 


Urine Culture Reflexed   ORDERED SEPARATELY   (NO)  


 


Urine Opiates Level  NEGATIVE    (NEGATIVE)  


 


Ur Methadone  NEGATIVE    (NEGATIVE)  


 


Urine Barbiturates  NEGATIVE    (NEGATIVE)  


 


Ur Phencyclidine (PCP)  NEGATIVE    (NEGATIVE)  


 


Urine Amphetamine  NEGATIVE    (NEGATIVE)  


 


U Benzodiazepine Level  NEGATIVE    (NEGATIVE)  


 


Urine Cocaine  NEGATIVE    (NEGATIVE)  


 


Urine Marijuana (THC)  NEGATIVE    (NEGATIVE)  


 


Influenza Type A Ag     (NEGATIVE)  


 


Influenza Type B Ag     (NEGATIVE)  


 


RSV (PCR)     (Negative)  


 


SARS-CoV-2 (PCR)     (NEGATIVE)  














  01/20/23 01/20/23 01/20/23 Range/Units





  16:40 16:40 16:40 


 


WBC     (4.0-10.5)  x10^3/uL


 


RBC     (4.1-5.4)  x10^6/uL


 


Hgb     (12.0-16.0)  g/dL


 


Hct     (35-47)  %


 


MCV     ()  fL


 


MCH     (26-32)  pg


 


MCHC     (32-36)  g/dL


 


RDW     (11.5-14.0)  %


 


Plt Count     (150-450)  x10^3/uL


 


MPV     (7.5-11.0)  fL


 


Gran %     (36.0-66.0)  %


 


Immature Gran % (Auto)     (0.00-0.4)  %


 


Nucleat RBC Rel Count     (0.00-0.1)  %


 


Eos # (Auto)     (0-0.5)  x10^3/uL


 


Immature Gran # (Auto)     (0.00-0.03)  x10^3u/L


 


Absolute Lymphs (auto)     (1.0-4.6)  x10^3/uL


 


Absolute Monos (auto)     (0.0-1.3)  x10^3/uL


 


Absolute Nucleated RBC     (0.00-0.01)  x10^3u/L


 


Lymphocytes %     (24.0-44.0)  %


 


Monocytes %     (0.0-12.0)  %


 


Eosinophils %     (0.00-5.0)  %


 


Basophils %     (0.0-0.4)  %


 


Absolute Granulocytes     (1.4-6.9)  x10^3/uL


 


Basophils #     (0-0.4)  x10^3/uL


 


D-Dimer   11.03 H*   (0.0-0.50)  mg/L


 


Sodium    141  (137-145)  mmol/L


 


Potassium    3.7  (3.5-5.1)  mmol/L


 


Chloride    105  ()  mmol/L


 


Carbon Dioxide    18 L  (22-30)  mmol/L


 


Anion Gap    21.1 H  (5-15)  MEQ/L


 


BUN    24 H  (7-17)  mg/dL


 


Creatinine    1.22 H  (0.52-1.04)  mg/dL


 


Estimated GFR    45.3  ML/MIN


 


Glucose    275 H  ()  mg/dL


 


Lactic Acid     (0.4-2.0)  


 


Calcium    9.2  (8.4-10.2)  mg/dL


 


Total Bilirubin    1.10  (0.2-1.3)  mg/dL


 


AST    59 H  (14-36)  U/L


 


ALT    32  (0-35)  U/L


 


Alkaline Phosphatase    133 H  ()  U/L


 


Ammonia     (9-30)  umol/L


 


Troponin I  < 0.012    (0.000-0.034)  ng/mL


 


NT-Pro-B Natriuret Pep    911  (0-1800)  pg/mL


 


Serum Total Protein    7.8  (6.3-8.2)  g/dL


 


Albumin    4.5  (3.5-5.0)  g/dL


 


Urine Color     (Yellow)  


 


Urine Appearance     (Clear)  


 


Urine pH     (4.6-8.0)  


 


Ur Specific Gravity     (1.005-1.030)  


 


Urine Protein     (Negative)  


 


Urine Glucose (UA)     (Negative)  mg/dL


 


Urine Ketones     (Negative)  


 


Urine Blood     (Negative)  


 


Urine Nitrite     (Negative)  


 


Urine Bilirubin     (Negative)  


 


Urine Urobilinogen     (0.2)  mg/dL


 


Ur Leukocyte Esterase     (Negative)  


 


U Hyaline Cast (Auto)     (0-2)  /LPF


 


Urine Microscopic RBC     (0-5)  /HPF


 


Urine Microscopic WBC     (0-5)  /HPF


 


Ur Epithelial Cells     (None Seen)  /HPF


 


Urine Bacteria     (None Seen)  /HPF


 


Urine Culture Reflexed     (NO)  


 


Urine Opiates Level     (NEGATIVE)  


 


Ur Methadone     (NEGATIVE)  


 


Urine Barbiturates     (NEGATIVE)  


 


Ur Phencyclidine (PCP)     (NEGATIVE)  


 


Urine Amphetamine     (NEGATIVE)  


 


U Benzodiazepine Level     (NEGATIVE)  


 


Urine Cocaine     (NEGATIVE)  


 


Urine Marijuana (THC)     (NEGATIVE)  


 


Influenza Type A Ag     (NEGATIVE)  


 


Influenza Type B Ag     (NEGATIVE)  


 


RSV (PCR)     (Negative)  


 


SARS-CoV-2 (PCR)     (NEGATIVE)  














  01/20/23 Range/Units





  16:40 


 


WBC  21.3 H  (4.0-10.5)  x10^3/uL


 


RBC  5.17  (4.1-5.4)  x10^6/uL


 


Hgb  14.8  (12.0-16.0)  g/dL


 


Hct  48.4 H  (35-47)  %


 


MCV  93.6  ()  fL


 


MCH  28.6  (26-32)  pg


 


MCHC  30.6 L  (32-36)  g/dL


 


RDW  15.1 H  (11.5-14.0)  %


 


Plt Count  183  (150-450)  x10^3/uL


 


MPV  10.0  (7.5-11.0)  fL


 


Gran %  85.1 H  (36.0-66.0)  %


 


Immature Gran % (Auto)  0.5 H  (0.00-0.4)  %


 


Nucleat RBC Rel Count  0.0  (0.00-0.1)  %


 


Eos # (Auto)  0.03  (0-0.5)  x10^3/uL


 


Immature Gran # (Auto)  0.11 H  (0.00-0.03)  x10^3u/L


 


Absolute Lymphs (auto)  2.26  (1.0-4.6)  x10^3/uL


 


Absolute Monos (auto)  0.74  (0.0-1.3)  x10^3/uL


 


Absolute Nucleated RBC  0.00  (0.00-0.01)  x10^3u/L


 


Lymphocytes %  10.6 L  (24.0-44.0)  %


 


Monocytes %  3.5  (0.0-12.0)  %


 


Eosinophils %  0.1  (0.00-5.0)  %


 


Basophils %  0.2  (0.0-0.4)  %


 


Absolute Granulocytes  18.14 H  (1.4-6.9)  x10^3/uL


 


Basophils #  0.04  (0-0.4)  x10^3/uL


 


D-Dimer   (0.0-0.50)  mg/L


 


Sodium   (137-145)  mmol/L


 


Potassium   (3.5-5.1)  mmol/L


 


Chloride   ()  mmol/L


 


Carbon Dioxide   (22-30)  mmol/L


 


Anion Gap   (5-15)  MEQ/L


 


BUN   (7-17)  mg/dL


 


Creatinine   (0.52-1.04)  mg/dL


 


Estimated GFR   ML/MIN


 


Glucose   ()  mg/dL


 


Lactic Acid   (0.4-2.0)  


 


Calcium   (8.4-10.2)  mg/dL


 


Total Bilirubin   (0.2-1.3)  mg/dL


 


AST   (14-36)  U/L


 


ALT   (0-35)  U/L


 


Alkaline Phosphatase   ()  U/L


 


Ammonia   (9-30)  umol/L


 


Troponin I   (0.000-0.034)  ng/mL


 


NT-Pro-B Natriuret Pep   (0-1800)  pg/mL


 


Serum Total Protein   (6.3-8.2)  g/dL


 


Albumin   (3.5-5.0)  g/dL


 


Urine Color   (Yellow)  


 


Urine Appearance   (Clear)  


 


Urine pH   (4.6-8.0)  


 


Ur Specific Gravity   (1.005-1.030)  


 


Urine Protein   (Negative)  


 


Urine Glucose (UA)   (Negative)  mg/dL


 


Urine Ketones   (Negative)  


 


Urine Blood   (Negative)  


 


Urine Nitrite   (Negative)  


 


Urine Bilirubin   (Negative)  


 


Urine Urobilinogen   (0.2)  mg/dL


 


Ur Leukocyte Esterase   (Negative)  


 


U Hyaline Cast (Auto)   (0-2)  /LPF


 


Urine Microscopic RBC   (0-5)  /HPF


 


Urine Microscopic WBC   (0-5)  /HPF


 


Ur Epithelial Cells   (None Seen)  /HPF


 


Urine Bacteria   (None Seen)  /HPF


 


Urine Culture Reflexed   (NO)  


 


Urine Opiates Level   (NEGATIVE)  


 


Ur Methadone   (NEGATIVE)  


 


Urine Barbiturates   (NEGATIVE)  


 


Ur Phencyclidine (PCP)   (NEGATIVE)  


 


Urine Amphetamine   (NEGATIVE)  


 


U Benzodiazepine Level   (NEGATIVE)  


 


Urine Cocaine   (NEGATIVE)  


 


Urine Marijuana (THC)   (NEGATIVE)  


 


Influenza Type A Ag   (NEGATIVE)  


 


Influenza Type B Ag   (NEGATIVE)  


 


RSV (PCR)   (Negative)  


 


SARS-CoV-2 (PCR)   (NEGATIVE)  














- Progress


Progress: improved, re-examined


Progress Note: 





01/20/23 18:56


CT of the head without contrast shows no acute intracranial abnormality.  There 

are chronic changes including chronic micro ischemic changes


01/20/23 20:50


CTA of the chest shows no evidence of pulmonary embolus in the large caliber 

pulmonary arteries.  Small caliber pulmonary arteries could not be assessed.  

There is mild bibasilar dependent atelectasis of the lower lobes but no 

evaluation of infiltrate.





Medical decision making: This patient's evaluation and medical decision making 

is a high complexity requiring evaluation of cardiac status with EKG and 

evaluation of heart enzymes and evaluation of and treatment of hypotension.  

Evaluation of the patient's lab work and treatment required placement of low-

dose Levophed and intravenous fluid of normal saline.  Patient is also septic 

and likely uroseptic is the source.  Patient was placed on Primaxin and this 

will be continued in the ICU in the hospital.  Patient has a Agrawal catheter in 

place.  Patient had altered mental status upon arrival to the emergency 

department.  Likely the secondary sepsis.  We also need to evaluate her 

neurologically and this included CT scan of the head without contrast as well 

evaluation of her ammonia level.  Reexamination of her at the time of transfer 

to the floor shows no abdominal pain.  Patient states clinically, she is feeling

much better.  She does have a fever and this was treated with Tylenol.  We will 

place her in the intensive care unit on low-dose Levophed which will be weaned 

off likely within the next 24 hours.  I did speak with Dr. Bishop.  We 

formulated the above plan.  We will continue her on adequate IV hydration and 

repeat labs in the morning.  I spoke with family members and patient several 

times during her stay in the emergency department and updated them on the 

results of the radiographic and laboratory studies as well as the plans for 

admission intensive care unit here in the hospital.


01/20/23 20:57





Discussed with : Natalee


Counseled pt/family regarding: lab results, diagnosis, rad results





- Departure


Departure Disposition: In-patient Admission


Clinical Impression: 


 Sepsis associated hypotension, Altered mental status





Condition: Serious


Critical Care Time: Yes


Critical Care Time(excluding separately billable procedures): Critical 30-74 

mins (50 minutes)


Referrals: 


JOHN DELONG MD [Primary Care Provider] - Follow up/PCP as directed

## 2023-01-21 LAB
ALBUMIN SERPL-MCNC: 3 G/DL (ref 3.5–5)
ALP SERPL-CCNC: 85 U/L (ref 38–126)
ALT SERPL-CCNC: 22 U/L (ref 0–35)
ANION GAP SERPL CALC-SCNC: 15.3 MEQ/L (ref 5–15)
AST SERPL QL: 40 U/L (ref 14–36)
BASOPHILS # BLD AUTO: 0.05 X10^3/UL (ref 0–0.4)
BASOPHILS NFR BLD AUTO: 0.3 % (ref 0–0.4)
BILIRUB BLD-MCNC: 1 MG/DL (ref 0.2–1.3)
BNP SERPL-MCNC: 620 PG/ML (ref 0–1800)
BUN SERPL-MCNC: 33 MG/DL (ref 7–17)
C DIFF DNA SPEC QL NAA+PROBE: (no result)
C DIFF TOX B STL QL: NEGATIVE
CALCIUM SPEC-MCNC: 7.6 MG/DL (ref 8.4–10.2)
CHLORIDE SERPL-SCNC: 109 MMOL/L (ref 98–107)
CO2 SERPL-SCNC: 17 MMOL/L (ref 22–30)
CREAT SERPL-MCNC: 1.29 MG/DL (ref 0.52–1.04)
EOSINOPHIL # BLD AUTO: 0 X10^3/UL (ref 0–0.5)
GFR SERPLBLD BASED ON 1.73 SQ M-ARVRAT: 42.5 ML/MIN
GLUCOSE SERPL-MCNC: 187 MG/DL (ref 74–106)
HCT VFR BLD AUTO: 45.8 % (ref 35–47)
HGB BLD-MCNC: 14 G/DL (ref 12–16)
IMM GRANULOCYTES # BLD: 0.07 X10^3U/L (ref 0–0.03)
IMM GRANULOCYTES NFR BLD: 0.4 % (ref 0–0.4)
LYMPHOCYTES # SPEC AUTO: 0.72 X10^3/UL (ref 1–4.6)
MCH RBC QN AUTO: 28.5 PG (ref 26–32)
MCHC RBC AUTO-ENTMCNC: 30.6 G/DL (ref 32–36)
MONOCYTES # BLD AUTO: 0.65 X10^3/UL (ref 0–1.3)
NRBC # BLD AUTO: 0 X10^3U/L (ref 0–0.01)
NRBC BLD AUTO-RTO: 0 % (ref 0–0.1)
PLATELET # BLD AUTO: 163 X10^3/UL (ref 150–450)
POTASSIUM SERPLBLD-SCNC: 4.5 MMOL/L (ref 3.5–5.1)
PROT SERPL-MCNC: 5.4 G/DL (ref 6.3–8.2)
RBC # BLD AUTO: 4.91 X10^6/UL (ref 4.1–5.4)
SODIUM SERPL-SCNC: 136 MMOL/L (ref 137–145)
WBC # BLD AUTO: 17.7 X10^3/UL (ref 4–10.5)

## 2023-01-21 RX ADMIN — INSULIN LISPRO SCH: 100 INJECTION, SOLUTION INTRAVENOUS; SUBCUTANEOUS at 13:51

## 2023-01-21 RX ADMIN — LOPERAMIDE HYDROCHLORIDE PRN MG: 2 CAPSULE ORAL at 12:03

## 2023-01-21 RX ADMIN — ACETAMINOPHEN PRN MG: 325 TABLET ORAL at 22:12

## 2023-01-21 RX ADMIN — NOREPINEPHRINE BITARTRATE PRN MLS/HR: 8 INJECTION, SOLUTION INTRAVENOUS at 00:00

## 2023-01-21 RX ADMIN — NYSTATIN SCH GM: 100000 POWDER TOPICAL at 13:56

## 2023-01-21 RX ADMIN — CEFEPIME HYDROCHLORIDE SCH: 2 INJECTION, POWDER, FOR SOLUTION INTRAVENOUS at 07:43

## 2023-01-21 RX ADMIN — ACETAMINOPHEN PRN MG: 325 TABLET ORAL at 12:59

## 2023-01-21 RX ADMIN — LOPERAMIDE HYDROCHLORIDE PRN MG: 2 CAPSULE ORAL at 15:00

## 2023-01-21 RX ADMIN — METRONIDAZOLE SCH MLS/HR: 500 INJECTION, SOLUTION INTRAVENOUS at 17:01

## 2023-01-21 RX ADMIN — NOREPINEPHRINE BITARTRATE PRN MLS/HR: 8 INJECTION, SOLUTION INTRAVENOUS at 20:30

## 2023-01-21 RX ADMIN — VERAPAMIL HYDROCHLORIDE SCH: 180 TABLET, FILM COATED, EXTENDED RELEASE ORAL at 20:46

## 2023-01-21 RX ADMIN — LOPERAMIDE HYDROCHLORIDE PRN MG: 2 CAPSULE ORAL at 12:59

## 2023-01-21 RX ADMIN — CEFEPIME HYDROCHLORIDE SCH MLS/HR: 2 INJECTION, POWDER, FOR SOLUTION INTRAVENOUS at 22:13

## 2023-01-21 RX ADMIN — GABAPENTIN SCH MG: 300 CAPSULE ORAL at 22:12

## 2023-01-21 RX ADMIN — IBUPROFEN PRN MG: 600 TABLET, FILM COATED ORAL at 09:47

## 2023-01-21 RX ADMIN — ENOXAPARIN SODIUM SCH MG: 100 INJECTION SUBCUTANEOUS at 09:43

## 2023-01-21 RX ADMIN — IBUPROFEN PRN MG: 600 TABLET, FILM COATED ORAL at 04:09

## 2023-01-21 RX ADMIN — METRONIDAZOLE SCH MLS/HR: 500 INJECTION, SOLUTION INTRAVENOUS at 12:11

## 2023-01-21 RX ADMIN — MIRABEGRON SCH: 25 TABLET, FILM COATED, EXTENDED RELEASE ORAL at 13:50

## 2023-01-21 RX ADMIN — GABAPENTIN SCH: 300 CAPSULE ORAL at 13:50

## 2023-01-21 RX ADMIN — ACETAMINOPHEN PRN MG: 325 TABLET ORAL at 07:01

## 2023-01-21 RX ADMIN — NITROGLYCERIN SCH: 0.4 PATCH TRANSDERMAL at 14:53

## 2023-01-21 RX ADMIN — FLUTICASONE PROPIONATE AND SALMETEROL XINAFOATE SCH PUFF: 115; 21 AEROSOL, METERED RESPIRATORY (INHALATION) at 17:43

## 2023-01-21 RX ADMIN — BUMETANIDE SCH: 1 TABLET ORAL at 13:50

## 2023-01-21 RX ADMIN — CLONIDINE HYDROCHLORIDE SCH: 0.1 TABLET ORAL at 20:46

## 2023-01-21 RX ADMIN — FLUTICASONE PROPIONATE AND SALMETEROL XINAFOATE SCH PUFF: 115; 21 AEROSOL, METERED RESPIRATORY (INHALATION) at 06:55

## 2023-01-21 RX ADMIN — LOPERAMIDE HYDROCHLORIDE PRN MG: 2 CAPSULE ORAL at 14:00

## 2023-01-21 RX ADMIN — IBUPROFEN PRN MG: 600 TABLET, FILM COATED ORAL at 18:32

## 2023-01-21 RX ADMIN — NOREPINEPHRINE BITARTRATE PRN MLS/HR: 8 INJECTION, SOLUTION INTRAVENOUS at 09:46

## 2023-01-21 RX ADMIN — PANTOPRAZOLE SODIUM SCH MG: 40 TABLET, DELAYED RELEASE ORAL at 13:56

## 2023-01-21 RX ADMIN — CEFEPIME HYDROCHLORIDE SCH MLS/HR: 2 INJECTION, POWDER, FOR SOLUTION INTRAVENOUS at 01:03

## 2023-01-21 RX ADMIN — CEFEPIME HYDROCHLORIDE SCH MLS/HR: 2 INJECTION, POWDER, FOR SOLUTION INTRAVENOUS at 13:50

## 2023-01-21 RX ADMIN — ASPIRIN SCH: 325 TABLET, COATED ORAL at 13:50

## 2023-01-21 NOTE — XRAY
Indication: Short of breath.  Abdomen pain.  Fever and diarrhea.  Low blood

pressure.



Multiple contiguous axial images obtained through the abdomen and pelvis

without contrast.



Comparison: None



CT chest reported separately.



Patient's body habitus limits exam with abdominal panus not completely

included in the field-of-view.



Noncontrasted stomach and small bowel loops appear unremarkable.  Distended

colon throughout up to 8 cm in diameter with synchronous fluid leveling

favoring ileus.  Descending and sigmoid demonstrates minimal pericolonic

stranding favoring colitis.  Tiny left colic and perihepatic free fluid.  No

walled off fluid collection or free air.



Moderately distended gallbladder with sub-5 mm gallstone near the neck.  No

abnormal biliary distention.  Appendectomy and hysterectomy reported.

Incidental tiny hepatic calcified granuloma.  Both kidneys excrete contrast

from CT PE exam performed earlier in the day.  Empty urinary bladder with

Agrawal balloon catheter in situ.



Remaining liver, pancreas, spleen, adrenal glands, and aorta are unremarkable.



Osseous structures intact with osteopenia, moderate degenerative changes

throughout the spine, and mild levorotoscoliosis.



Impression:

1.  Diffusely distended colon with fluid leveling and descending/sigmoid

colitis.  Tiny left colic free fluid presumed reactive.

2.  Distended gallbladder with tiny gallstones.  Cholecystitis not completely

excluded in the right clinical setting given tiny perihepatic fluid.

Gallbladder sonogram may yield further information.

3.  Chronic bony findings.



Comment: Preliminary interpretation made by C.  No critical discrepancy.

## 2023-01-21 NOTE — XRAY
Indication: Syncopal episode.  Acute mental status change.



Multiple contiguous axial images obtained through the head without contrast.



Comparison: None



Age-appropriate global atrophy and mild periventricular degenerative

micro-ischemia bilaterally.  No acute intracranial hemorrhage, abnormal

extra-axial fluid collection, or mass effect.  Fourth ventricle is midline

without hydrocephalus.  Bony calvarium intact.  Visualized paranasal sinuses

and mastoid air cells are clear.



Impression: Nonacute senile brain.



Comment: Preliminary interpretation made by [facility].  No critical discrepancy.

## 2023-01-21 NOTE — XRAY
Indication: Short of breath.  Elevated d-dimer.



Multiple contiguous axial images obtained through the chest using 100 cc

Isovue-370 contrast and PE protocol.



Comparison: None



Good opacification of the pulmonary arteries.  However mild diffuse

respiration artifact limits evaluation of the lobar and segmental branches.

No pulmonary embolus.  Heart borderline enlarged.  Aorta is normal in course

and caliber.  Small subcarinal calcified node.  No pathologic

mediastinal/hilar lymphadenopathy.  Small hiatal hernia.  Fluid in mid to

distal esophagus presumably from gastroesophageal reflux.



Lungs demonstrates mild bilateral dependent atelectasis, left greater than

right.  No suspicious pulmonary mass, infiltrate, effusion, or pneumothorax.



Bony thorax demonstrates osteopenia, mild/moderate degenerative changes

throughout the spine, and mild dextroscoliosis.



CT abdomen/pelvis reported separately.



Impression:

1.  Pulmonary embolus evaluation limited by respiration artifact.  No obvious

central pulmonary embolus.

2.  Bilateral dependent atelectasis.  No acute cardiopulmonary abnormalities.

3.  Small hiatal hernia with GERD, chronic bony findings, and old

granulomatous disease.



Comment: Preliminary interpretation made by C.  No critical discrepancy.

## 2023-01-22 VITALS — SYSTOLIC BLOOD PRESSURE: 81 MMHG | DIASTOLIC BLOOD PRESSURE: 50 MMHG

## 2023-01-22 VITALS — HEART RATE: 100 BPM

## 2023-01-22 VITALS — OXYGEN SATURATION: 95 %

## 2023-01-22 LAB
ALBUMIN SERPL-MCNC: 2.8 G/DL (ref 3.5–5)
ALP SERPL-CCNC: 86 U/L (ref 38–126)
ALT SERPL-CCNC: 24 U/L (ref 0–35)
ANION GAP SERPL CALC-SCNC: 16.2 MEQ/L (ref 5–15)
AST SERPL QL: 64 U/L (ref 14–36)
BASOPHILS # BLD AUTO: 0.09 X10^3/UL (ref 0–0.4)
BASOPHILS NFR BLD AUTO: 0.5 % (ref 0–0.4)
BILIRUB BLD-MCNC: 0.8 MG/DL (ref 0.2–1.3)
BUN SERPL-MCNC: 56 MG/DL (ref 7–17)
CALCIUM SPEC-MCNC: 6.7 MG/DL (ref 8.4–10.2)
CELLS COUNTED: 100
CHLORIDE SERPL-SCNC: 111 MMOL/L (ref 98–107)
CO2 SERPL-SCNC: 10 MMOL/L (ref 22–30)
CREAT SERPL-MCNC: 1.65 MG/DL (ref 0.52–1.04)
EOSINOPHIL # BLD AUTO: 0.23 X10^3/UL (ref 0–0.5)
GFR SERPLBLD BASED ON 1.73 SQ M-ARVRAT: 32 ML/MIN
GLUCOSE SERPL-MCNC: 160 MG/DL (ref 74–106)
HCT VFR BLD AUTO: 45.8 % (ref 35–47)
HGB BLD-MCNC: 14.6 G/DL (ref 12–16)
IMM GRANULOCYTES # BLD: 0.22 X10^3U/L (ref 0–0.03)
IMM GRANULOCYTES NFR BLD: 1.3 % (ref 0–0.4)
LYMPHOCYTES # SPEC AUTO: 1.1 X10^3/UL (ref 1–4.6)
MAGNESIUM SERPL-MCNC: 2.9 MG/DL (ref 1.6–2.3)
MANUAL DIF COMMENT BLD-IMP: NORMAL
MCH RBC QN AUTO: 28.5 PG (ref 26–32)
MCHC RBC AUTO-ENTMCNC: 31.9 G/DL (ref 32–36)
MONOCYTES # BLD AUTO: 1.19 X10^3/UL (ref 0–1.3)
NRBC # BLD AUTO: 0 X10^3U/L (ref 0–0.01)
NRBC BLD AUTO-RTO: 0 % (ref 0–0.1)
PLATELET # BLD AUTO: 170 X10^3/UL (ref 150–450)
POTASSIUM SERPLBLD-SCNC: 5 MMOL/L (ref 3.5–5.1)
PROT SERPL-MCNC: 5.5 G/DL (ref 6.3–8.2)
RBC # BLD AUTO: 5.12 X10^6/UL (ref 4.1–5.4)
SODIUM SERPL-SCNC: 132 MMOL/L (ref 137–145)
WBC # BLD AUTO: 16.9 X10^3/UL (ref 4–10.5)

## 2023-01-22 RX ADMIN — IBUPROFEN PRN MG: 600 TABLET, FILM COATED ORAL at 00:39

## 2023-01-22 RX ADMIN — LOPERAMIDE HYDROCHLORIDE PRN MG: 2 CAPSULE ORAL at 07:34

## 2023-01-22 RX ADMIN — VERAPAMIL HYDROCHLORIDE SCH: 180 TABLET, FILM COATED, EXTENDED RELEASE ORAL at 08:46

## 2023-01-22 RX ADMIN — NITROGLYCERIN SCH: 0.4 PATCH TRANSDERMAL at 08:46

## 2023-01-22 RX ADMIN — NOREPINEPHRINE BITARTRATE PRN MLS/HR: 8 INJECTION, SOLUTION INTRAVENOUS at 08:45

## 2023-01-22 RX ADMIN — METRONIDAZOLE SCH MLS/HR: 500 INJECTION, SOLUTION INTRAVENOUS at 06:03

## 2023-01-22 RX ADMIN — IBUPROFEN PRN MG: 600 TABLET, FILM COATED ORAL at 07:34

## 2023-01-22 RX ADMIN — ACETAMINOPHEN PRN MG: 325 TABLET ORAL at 04:02

## 2023-01-22 RX ADMIN — NYSTATIN SCH GM: 100000 POWDER TOPICAL at 08:46

## 2023-01-22 RX ADMIN — NOREPINEPHRINE BITARTRATE PRN MLS/HR: 8 INJECTION, SOLUTION INTRAVENOUS at 04:14

## 2023-01-22 RX ADMIN — INSULIN LISPRO SCH: 100 INJECTION, SOLUTION INTRAVENOUS; SUBCUTANEOUS at 07:03

## 2023-01-22 RX ADMIN — ASPIRIN SCH MG: 325 TABLET, COATED ORAL at 09:02

## 2023-01-22 RX ADMIN — FLUTICASONE PROPIONATE AND SALMETEROL XINAFOATE SCH PUFF: 115; 21 AEROSOL, METERED RESPIRATORY (INHALATION) at 06:35

## 2023-01-22 RX ADMIN — GABAPENTIN SCH: 300 CAPSULE ORAL at 09:06

## 2023-01-22 RX ADMIN — PANTOPRAZOLE SODIUM SCH MG: 40 TABLET, DELAYED RELEASE ORAL at 09:02

## 2023-01-22 RX ADMIN — LOPERAMIDE HYDROCHLORIDE PRN MG: 2 CAPSULE ORAL at 08:48

## 2023-01-22 RX ADMIN — CLONIDINE HYDROCHLORIDE SCH: 0.1 TABLET ORAL at 08:46

## 2023-01-22 RX ADMIN — CEFEPIME HYDROCHLORIDE SCH MLS/HR: 2 INJECTION, POWDER, FOR SOLUTION INTRAVENOUS at 05:03

## 2023-01-22 RX ADMIN — BUMETANIDE SCH: 1 TABLET ORAL at 08:46

## 2023-01-22 RX ADMIN — MIRABEGRON SCH: 25 TABLET, FILM COATED, EXTENDED RELEASE ORAL at 08:46

## 2023-01-22 RX ADMIN — METRONIDAZOLE SCH MLS/HR: 500 INJECTION, SOLUTION INTRAVENOUS at 00:40

## 2023-01-22 RX ADMIN — ENOXAPARIN SODIUM SCH MG: 100 INJECTION SUBCUTANEOUS at 09:02

## 2023-01-22 NOTE — PCM.NOTE
Date and Time: 01/22/23  0757





Subjective Assessment: 





Patient is more and more weak and lethargic.  Patient blood pressure is in the 

range of 100-1 10 systolic while 50-60 diastolic with Levophed at 28 mcg/min.  

Patient is lethargic complaining of abdominal pain.  Patient is easily 

arousable.





[facility] call to have a bed available.  I talked to hospitalist care 

and they have accepted patient awaiting transfer.





- Review of Systems


Constitutional: Lethargy, Weakness, No Fever, No Chills


Eyes: No Symptoms


Ears, Nose, & Throat: No Symptoms


Respiratory: Orthopnea, No Cough, No Short Of Breath


Cardiac: No Chest Pain, No Edema, No Syncope


Abdominal/Gastrointestinal: No Abdominal Pain, No Nausea, No Vomiting, No 

Diarrhea


Genitourinary Symptoms: No Dysuria


Musculoskeletal: No Back Pain, No Neck Pain


Skin: No Rash


Neurological: No Dizziness, No Focal Weakness, No Sensory Changes


Psychological: No Symptoms


Endocrine: No Symptoms


Hematologic/Lymphatic: No Symptoms


Immunological/Allergic: No Symptoms





Objective Exam


General Appearance: moderate distress, alert


Neurologic Exam: alert, No motor deficits


Skin Exam: normal color, warm, dry


Eye Exam: PERRL, EOMI, eyes nml inspection


Ears, Nose, Throat Exam: normal ENT inspection, pharynx normal, moist mucous 

membranes


Neck Exam: normal inspection, non-tender, supple, full range of motion


Respiratory Exam: diminished breath sounds, crackles/rales, rhonchi, No 

respiratory distress


Cardiovascular Exam: regular rate/rhythm, normal heart sounds


Gastrointestinal/Abdomen Exam: soft, No tenderness, No mass


Extremity Exam: normal inspection, normal range of motion


Back Exam: normal inspection, normal range of motion, No CVA tenderness, No 

vertebral tenderness


Pelvic Exam: deferred


Rectal Exam: deferred





OBJECTIVE DATA


Vital Signs: 


                               Vital Signs - 24 hr











  Temp Pulse Resp BP Pulse Ox


 


 01/22/23 07:10   99 H   


 


 01/22/23 06:37   99 H  24   94 L


 


 01/22/23 06:30  102.4 F  99 H  20  79/47  93 L


 


 01/22/23 06:00     93/53 


 


 01/22/23 05:55  102.2 F  97 H  20  107/71  93 L


 


 01/22/23 05:37  102.0 F  99 H  29 H  73/46  90 L


 


 01/22/23 04:35  101.7 F  97 H  27 H  94/65  93 L


 


 01/22/23 03:37  101.5 F  97 H  24  72/48  92 L


 


 01/22/23 02:33  101.5 F  91 H  28 H  97/62  91 L


 


 01/22/23 01:37  101.3 F  94 H  27 H  94/60  95


 


 01/22/23 01:00  101.1 F  95 H  32 H  91/55  91 L


 


 01/22/23 00:46  101.1 F  95 H  36 H  82/56  91 L


 


 01/22/23 00:30  101.1 F  95 H  28 H  66/42  94 L


 


 01/22/23 00:00  100.9 F  95 H  24  90/58  94 L


 


 01/21/23 23:30  100.9 F  90  22  90/48  95


 


 01/21/23 23:00  100.9 F  82  24  78/52  95


 


 01/21/23 22:48  100.9 F  91 H  24  70/57  95


 


 01/21/23 22:00  100.9 F  94 H  16  71/43  94 L


 


 01/21/23 21:00  100.9 F  89  20   93 L


 


 01/21/23 20:00  100.9 F  91 H  24  106/70  94 L


 


 01/21/23 18:43  100.8 F  91 H  17  95/72  96


 


 01/21/23 17:52  100.8 F  89  23  101/65  94 L


 


 01/21/23 17:45   90  20   95


 


 01/21/23 16:48     107/88 


 


 01/21/23 16:47  100.6 F  92 H  24  107/88  95


 


 01/21/23 15:54  100.6 F  84  24  85/68  94 L


 


 01/21/23 15:07     83/54 


 


 01/21/23 14:52  100.4 F  86  22  72/56  96


 


 01/21/23 14:00  100.4 F  86  21  85/56  95


 


 01/21/23 13:00  100.6 F  88  18  93/69  94 L


 


 01/21/23 12:00   82   


 


 01/21/23 11:54  100.6 F  82  20  80/55  95


 


 01/21/23 10:55  100.6 F  87  24  94/48  94 L


 


 01/21/23 09:56  100.6 F  80  20  92/57  95


 


 01/21/23 09:00  100.8 F  86  19  90/54  94 L


 


 01/21/23 08:00   84   89/55 








                        Pain Assessment - Last Documented











Pain Intensity                 0


 


Pain Scale Used                0-10 Pain Scale











Intake and Output: 


                                 Intake & Output











 01/19/23 01/20/23 01/21/23 01/22/23





 11:59 11:59 11:59 11:59


 


Intake Total   5030 4567


 


Output Total   603 1275


 


Balance   4180 3292


 


Weight   139.5 kg 











Lab Results: 


                            Lab Results-Last 24 Hours











  01/21/23 01/21/23 01/21/23 Range/Units





  10:58 16:20 20:28 


 


WBC     (4.0-10.5)  x10^3/uL


 


RBC     (4.1-5.4)  x10^6/uL


 


Hgb     (12.0-16.0)  g/dL


 


Hct     (35-47)  %


 


MCV     ()  fL


 


MCH     (26-32)  pg


 


MCHC     (32-36)  g/dL


 


RDW     (11.5-14.0)  %


 


Plt Count     (150-450)  x10^3/uL


 


MPV     (7.5-11.0)  fL


 


Gran %     (36.0-66.0)  %


 


Immature Gran % (Auto)     (0.00-0.4)  %


 


Nucleat RBC Rel Count     (0.00-0.1)  %


 


Eos # (Auto)     (0-0.5)  x10^3/uL


 


Immature Gran # (Auto)     (0.00-0.03)  x10^3u/L


 


Absolute Lymphs (auto)     (1.0-4.6)  x10^3/uL


 


Absolute Monos (auto)     (0.0-1.3)  x10^3/uL


 


Absolute Nucleated RBC     (0.00-0.01)  x10^3u/L


 


Lymphocytes %     (24.0-44.0)  %


 


Monocytes %     (0.0-12.0)  %


 


Eosinophils %     (0.00-5.0)  %


 


Basophils %     (0.0-0.4)  %


 


Absolute Granulocytes     (1.4-6.9)  x10^3/uL


 


Basophils #     (0-0.4)  x10^3/uL


 


Sodium     (137-145)  mmol/L


 


Potassium     (3.5-5.1)  mmol/L


 


Chloride     ()  mmol/L


 


Carbon Dioxide     (22-30)  mmol/L


 


Anion Gap     (5-15)  MEQ/L


 


BUN     (7-17)  mg/dL


 


Creatinine     (0.52-1.04)  mg/dL


 


Estimated GFR     ML/MIN


 


Glucose     ()  mg/dL


 


POC Glucometer  163 H  160 H  144 H  (74 to 106)  mg/dL


 


Calcium     (8.4-10.2)  mg/dL


 


Magnesium     (1.6-2.3)  mg/dL


 


Total Bilirubin     (0.2-1.3)  mg/dL


 


AST     (14-36)  U/L


 


ALT     (0-35)  U/L


 


Alkaline Phosphatase     ()  U/L


 


Serum Total Protein     (6.3-8.2)  g/dL


 


Albumin     (3.5-5.0)  g/dL














  01/22/23 01/22/23 01/22/23 Range/Units





  06:36 06:36 06:45 


 


WBC  16.9 H    (4.0-10.5)  x10^3/uL


 


RBC  5.12    (4.1-5.4)  x10^6/uL


 


Hgb  14.6    (12.0-16.0)  g/dL


 


Hct  45.8    (35-47)  %


 


MCV  89.5    ()  fL


 


MCH  28.5    (26-32)  pg


 


MCHC  31.9 L    (32-36)  g/dL


 


RDW  15.8 H    (11.5-14.0)  %


 


Plt Count  170    (150-450)  x10^3/uL


 


MPV  10.6    (7.5-11.0)  fL


 


Gran %  83.3 H    (36.0-66.0)  %


 


Immature Gran % (Auto)  1.3 H    (0.00-0.4)  %


 


Nucleat RBC Rel Count  0.0    (0.00-0.1)  %


 


Eos # (Auto)  0.23    (0-0.5)  x10^3/uL


 


Immature Gran # (Auto)  0.22 H    (0.00-0.03)  x10^3u/L


 


Absolute Lymphs (auto)  1.10    (1.0-4.6)  x10^3/uL


 


Absolute Monos (auto)  1.19    (0.0-1.3)  x10^3/uL


 


Absolute Nucleated RBC  0.00    (0.00-0.01)  x10^3u/L


 


Lymphocytes %  6.5 L    (24.0-44.0)  %


 


Monocytes %  7.0    (0.0-12.0)  %


 


Eosinophils %  1.4    (0.00-5.0)  %


 


Basophils %  0.5    (0.0-0.4)  %


 


Absolute Granulocytes  14.08 H    (1.4-6.9)  x10^3/uL


 


Basophils #  0.09    (0-0.4)  x10^3/uL


 


Sodium   132 L   (137-145)  mmol/L


 


Potassium   5.0   (3.5-5.1)  mmol/L


 


Chloride   111 H   ()  mmol/L


 


Carbon Dioxide   10 L*   (22-30)  mmol/L


 


Anion Gap   16.2 H   (5-15)  MEQ/L


 


BUN   56 H   (7-17)  mg/dL


 


Creatinine   1.65 H   (0.52-1.04)  mg/dL


 


Estimated GFR   32.0   ML/MIN


 


Glucose   160 H   ()  mg/dL


 


POC Glucometer    144 H  (74 to 106)  mg/dL


 


Calcium   6.7 L   (8.4-10.2)  mg/dL


 


Magnesium   2.9 H   (1.6-2.3)  mg/dL


 


Total Bilirubin   0.80   (0.2-1.3)  mg/dL


 


AST   64 H   (14-36)  U/L


 


ALT   24   (0-35)  U/L


 


Alkaline Phosphatase   86   ()  U/L


 


Serum Total Protein   5.5 L   (6.3-8.2)  g/dL


 


Albumin   2.8 L   (3.5-5.0)  g/dL











Radiology Exams: 


                              Radiology Procedures











 Category Date Time Status


 


 ABDOMEN AND PELVIS W/0 CONTRAS [CT] Stat Exams  01/21/23 03:40 Completed


 


 CHEST WITH CONTRAST [CT] Stat Exams  01/20/23 19:42 Completed


 


 HEAD WITHOUT CONTRAST [CT] Stat Exams  01/20/23 16:53 Completed














Assessment/Plan


(1) Sepsis associated hypotension


Current Visit: Yes   Status: Acute   


Assessment & Plan: 


Patient is still remaining hypotensive.  Blood pressure is in the range of 100-

110 systolic while 50-60 diastolic.  Patient is on Levophed 28 mcg/min.  Patient

 is on Merrem Flagyl and vancomycin which is added today.





I [facility] Dr. Fontaine [facility] to ICU 

there.  They have accepted the patient's.  We will transfer the patient as soon 

as we get bed assignment.  Patient condition is fair but stable at the time of 

transfer.


Code(s): A41.9 - SEPSIS, UNSPECIFIED ORGANISM; I95.9 - HYPOTENSION, UNSPECIFIED 

  





(2) Sigmoid diverticulitis


Current Visit: Yes   Status: Acute   


Assessment & Plan: 


                                Last Vital Signs











Temp  102.8 F   01/22/23 07:55


 


Pulse  100 H  01/22/23 07:55


 


Resp  24   01/22/23 07:55


 


BP  82/53   01/22/23 07:55


 


Pulse Ox  94 L  01/22/23 07:55








Allergies





sulfamethoxazole [From Bactrim] Allergy (Verified 01/20/23 23:00)


   


   itching, SOB 


trimethoprim [From Bactrim] Allergy (Verified 01/20/23 23:00)


   


zinc Adverse Reaction (Mild, Verified 01/20/23 23:00)


   Rash


zinc oxide Adverse Reaction (Mild, Verified 01/20/23 23:00)


   Rash


satin tape Allergy (Uncoded 01/20/23 23:00)


   





Active Medications





Acetaminophen (Acetaminophen 325 Mg Tablet)  975 mg PO Q6H PRN PRN


   PRN Reason: FEVER


   Stop: 02/20/23 03:37


   Last Admin: 01/22/23 04:02 Dose:  975 mg


   


Aspirin (Aspirin 325 Mg Tablet.Ec)  325 mg PO DAILY [facility]


   Stop: 02/20/23 13:59


   Last Admin: 01/21/23 13:50 Dose:  Not Given


   


Bisacodyl (Bisacodyl 5 Mg Tablet.Ec)  5 mg PO BID PRN PRN


   PRN Reason: CONSTIPATION


   Stop: 02/20/23 13:23


Bumetanide (Bumetanide 1 Mg Tablet)  2 mg PO DAILY [facility]


   Stop: 02/20/23 13:59


   Last Admin: 01/21/23 13:50 Dose:  Not Given


   


Clonidine (Clonidine Hcl 0.1 Mg Tablet)  0.1 mg PO BID [facility]


   Stop: 02/20/23 21:59


   Last Admin: 01/21/23 20:46 Dose:  Not Given


   


Enoxaparin Sodium (Enoxaparin Sodium*** 40 Mg/0.4 Ml Syringe)  40 mg SQ DAILY 

[facility]


   Stop: 02/20/23 09:59


   Last Admin: 01/21/23 09:43 Dose:  40 mg


   


Gabapentin (Gabapentin 300 Mg Capsule)  600 mg PO BID [facility]


   Stop: 02/20/23 13:59


   Last Admin: 01/21/23 22:12 Dose:  600 mg


   


Hydroxyzine HCl (Hydroxyzine Hcl 25 Mg Tablet)  25 mg PO TIDPRN PRN


   PRN Reason: Leg Cramps


   Stop: 02/20/23 13:23


Meropenem 1 gm/ Sodium (Chloride)  100 mls @ 200 mls/hr IV Q8HT [facility]


   Stop: 01/23/23 22:38


   Last Admin: 01/22/23 05:03 Dose:  200 mls/hr


   


Norepinephrine/Dextrose (Norepinephrine 8 Mg/250 Ml-D5w)  8 mg in 250 mls @ 15 

mls/hr IV .[id number] PRN; Protocol


   PRN Reason: HYPOTENSION


   Stop: 02/19/23 22:38


   Last Titration: 01/22/23 07:04 Dose:  28 mcg/min, 52.5 mls/hr


   


Lactated Ringer's (Lactated Ringers)  1,000 mls @ 150 mls/hr IV .Q6H40M [facility]


   Stop: 02/20/23 11:59


   Last Admin: 01/22/23 00:39 Dose:  150 mls/hr


   


Metronidazole (Flagyl 500 Mg Ivpb)  500 mg in 100 mls @ 200 mls/hr IV Q6HT [facility]


   Stop: 02/20/23 11:59


   Last Admin: 01/22/23 06:03 Dose:  200 mls/hr


   


Lactated Ringer's (Lactated Ringers)  1,000 mls @ 999 mls/hr IV .Q1H1M [facility]


   Stop: 01/22/23 08:26


   Last Admin: 01/22/23 07:48 Dose:  999 mls/hr


   


Lactated Ringer's (Lactated Ringers)  1,000 mls @ 999 mls/hr IV .Q1H1M ONE


   Stop: 01/22/23 08:49


Ibuprofen (Ibuprofen 600 Mg Tablet)  600 mg PO Q6H PRN PRN


   PRN Reason: FEVER


   Stop: 02/20/23 03:38


   Last Admin: 01/22/23 07:34 Dose:  600 mg


   


Insulin Human Lispro (Insulin Lispro 1 Unit)  9 unit SQ AC [facility]


   Stop: 02/20/23 16:29


   Last Admin: 01/22/23 07:03 Dose:  Not Given


   


Insulin Human Regular (Insulin Regular, Human 1 Unit)  0 unit SQ UD PRN


   PRN Reason: HYPERGLYCEMIA


   Stop: 02/19/23 22:38


Lisinopril (Lisinopril 20 Mg Tablet)  20 mg PO QHS [facility]


   Stop: 02/20/23 21:59


   Last Admin: 01/21/23 20:48 Dose:  Not Given


   


Lisinopril (Lisinopril 5 Mg Tablet)  5 mg PO QHS [facility]


   Stop: 02/20/23 21:59


   Last Admin: 01/21/23 20:48 Dose:  Not Given


   


Loperamide HCl (Loperamide Hcl 2 Mg Capsule)  2 mg PO PRN PRN


   PRN Reason: DIARRHEA


   Stop: 02/20/23 11:54


   Last Admin: 01/22/23 07:34 Dose:  2 mg


   


Mirabegron (Mirabegron 25 Mg Tab.Er.24h)  50 mg PO DAILY [facility]


   Stop: 02/20/23 13:59


   Last Admin: 01/21/23 13:50 Dose:  Not Given


   


Miscellaneous Information (Medication Intervention 1 Each Each)  1 each MC .RN 

TO CHECK [facility]


   Stop: 02/20/23 14:44


Miscellaneous Information (Medication Intervention 1 Each Each)  1 each MC .RN 

TO CHECK [facility]


   Stop: 02/20/23 14:44


Nitroglycerin (Nitroglycerin 0.4 Mg Patch)  0.4 mg TOP DAILY [facility]


   Stop: 02/20/23 14:59


   Last Admin: 01/21/23 14:53 Dose:  Not Given


   


Non-Formulary Medication (Remove Patch 1 Each)  1 each [facility]


   Stop: 02/20/23 21:59


   Last Admin: 01/21/23 20:47 Dose:  Not Given


   


Nystatin (Nystatin 15 Gm Powder)  0 gm TOP DAILY [facility]


   Stop: 02/20/23 13:59


   Last Admin: 01/21/23 13:56 Dose:  1 gm


   


Ondansetron HCl (Ondansetron Hcl 4 Mg/2 Ml Vial)  4 mg IV Q6H PRN PRN


   PRN Reason: NAUSEA/VOMITING


   Stop: 02/19/23 22:38


Pantoprazole Sodium (Protonix (Pantoprazole) 40 Mg Tablet)  40 mg PO DAILY [facility]


   Stop: 02/20/23 13:59


   Last Admin: 01/21/23 13:56 Dose:  40 mg


   


Ropinirole HCl (Ropinirole Hcl 2 Mg Tablet)  3 mg PO QHS [facility]


   Stop: 02/20/23 21:59


   Last Admin: 01/21/23 22:12 Dose:  3 mg


   


Fluticasone/Salmeterol (Fluticasone/Salmeterol 115/21 - 120 Puff Common 

Canister)  2 puff IH BIDRT [facility]


   Stop: 02/20/23 06:59


   Last Admin: 01/22/23 06:35 Dose:  2 puff


   


Simvastatin (Simvastatin 10 Mg Tablet)  10 mg PO [facility]


   Stop: 02/20/23 21:59


   Last Admin: 01/21/23 22:12 Dose:  10 mg


   


Verapamil HCl (Verapamil Hcl Sr 180 Mg Tablet.Sa)  180 mg PO BID [facility]


   Stop: 02/20/23 21:59


   Last Admin: 01/21/23 20:46 Dose:  Not Given


   





                                 Intake & Output











 01/21/23 01/22/23





 11:59 11:59


 


Intake Total 5030 4567


 


Output Total 850 1275


 


Balance 4180 3292


 


Weight 139.5 kg 








                                     Orders





01/21/23 07:00


Fluticasone/Salmeterol 115/21 [Advair Hfa 115/21 Common canister*]   2 puff IH 

BIDRT 





01/21/23 07:49


NPO except Meds 





01/21/23 11:55


Loperamide HCl 2 mg*** [Imodium 2 mg***]   2 mg PO PRN PRN 





01/21/23 12:00


Metronidazole 500 mg Premix [Flagyl 500 mg Ivpb] 500 mg in 100 ml IV Q6HT 


Ringers Solution,Lactated [Lactated Ringers] 1,000 ml  mls/hr 





01/21/23 13:24


Bisacodyl 5 mg*** [Dulcolax 5 mg***]   5 mg PO BID PRN PRN 


Hydroxyzine HCl 25 mg*** [Atarax 25 mg***]   25 mg PO TIDPRN PRN 





01/21/23 14:00


Aspirin  mg*** [Ecotrin 325 MG***]   325 mg PO DAILY 


Bumetanide 1 mg*** [Bumex 1 mg***]   2 mg PO DAILY 


Gabapentin *** [Neurontin ***]   600 mg PO BID 


Mirabegron [Myrbetriq]   50 mg PO DAILY 


Nystatin Powder 15 gm*** [Nystop Powder 15 gm***]   See Dose Instructions  TOP 

DAILY 


PANTOPRAZOLE 40 mg Tablet*** [Protonix 40MG Tablet***]   40 mg PO DAILY 





01/21/23 14:45


Medication Intervention   1 each MC .RN TO CHECK 


Medication Intervention   1 each MC .RN TO CHECK 





01/21/23 15:00


Nitroglycerin 0.4 mg/Hr*** [Nitro-Dur 0.4 MG/HR***]   0.4 mg TOP DAILY 





01/21/23 16:30


Insulin Lispro [Humalog]   9 unit SQ AC 





01/21/23 22:00


Clonidine HCl 0.1 mg*** [Clonidine 0.1 mg Tablet]   0.1 mg PO BID 


Lisinopril 20 mg*** [Zestril 20 MG***]   20 mg PO QHS 


Lisinopril 5 mg*** [Zestril 5 MG***]   5 mg PO QHS 


Remove Patch [Remove Patch Reminder]   1 each TOP HS 


Ropinirole 2Mg*** [Requip 2Mg Tab***]   3 mg PO QHS 


Simvastatin 10 mg** [Zocor 10MG**]   10 mg PO HS 


Verapamil HCl Sr [Isoptin Sr]   180 mg PO BID 





01/22/23 07:26


Ringers Solution,Lactated [Lactated Ringers] 1,000 ml  mls/hr 





01/22/23 07:49


Ringers Solution,Lactated [Lactated Ringers] 1,000 ml  mls/hr 








                                    Lab Tests











  01/21/23 01/21/23 01/21/23





  10:58 16:20 20:28


 


WBC   


 


RBC   


 


Hgb   


 


Hct   


 


MCV   


 


MCH   


 


MCHC   


 


RDW   


 


Plt Count   


 


MPV   


 


Gran %   


 


Immature Gran % (Auto)   


 


Nucleat RBC Rel Count   


 


Eos # (Auto)   


 


Immature Gran # (Auto)   


 


Absolute Lymphs (auto)   


 


Absolute Monos (auto)   


 


Absolute Nucleated RBC   


 


Lymphocytes %   


 


Monocytes %   


 


Eosinophils %   


 


Basophils %   


 


Absolute Granulocytes   


 


Basophils #   


 


Sodium   


 


Potassium   


 


Chloride   


 


Carbon Dioxide   


 


Anion Gap   


 


BUN   


 


Creatinine   


 


Estimated GFR   


 


Glucose   


 


POC Glucometer  163 H  160 H  144 H


 


Calcium   


 


Magnesium   


 


Total Bilirubin   


 


AST   


 


ALT   


 


Alkaline Phosphatase   


 


Serum Total Protein   


 


Albumin   














  01/22/23 01/22/23 01/22/23





  06:36 06:36 06:45


 


WBC  16.9 H  


 


RBC  5.12  


 


Hgb  14.6  


 


Hct  45.8  


 


MCV  89.5  


 


MCH  28.5  


 


MCHC  31.9 L  


 


RDW  15.8 H  


 


Plt Count  170  


 


MPV  10.6  


 


Gran %  83.3 H  


 


Immature Gran % (Auto)  1.3 H  


 


Nucleat RBC Rel Count  0.0  


 


Eos # (Auto)  0.23  


 


Immature Gran # (Auto)  0.22 H  


 


Absolute Lymphs (auto)  1.10  


 


Absolute Monos (auto)  1.19  


 


Absolute Nucleated RBC  0.00  


 


Lymphocytes %  6.5 L  


 


Monocytes %  7.0  


 


Eosinophils %  1.4  


 


Basophils %  0.5  


 


Absolute Granulocytes  14.08 H  


 


Basophils #  0.09  


 


Sodium   132 L 


 


Potassium   5.0 


 


Chloride   111 H 


 


Carbon Dioxide   10 L* 


 


Anion Gap   16.2 H 


 


BUN   56 H 


 


Creatinine   1.65 H 


 


Estimated GFR   32.0 


 


Glucose   160 H 


 


POC Glucometer    144 H


 


Calcium   6.7 L 


 


Magnesium   2.9 H 


 


Total Bilirubin   0.80 


 


AST   64 H 


 


ALT   24 


 


Alkaline Phosphatase   86 


 


Serum Total Protein   5.5 L 


 


Albumin   2.8 L 








Microbiology





01/20/23 18:44   Blood   Blood Culture - Preliminary


                            NO GROWTH TO DATE


01/20/23 16:40   Catherized   Urine Culture - Preliminary


                            GRAM NEGATIVE ID AND SENSITIVITY PENDING








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   





(3) Altered mental status


Current Visit: Yes   Status: Acute   


Qualifiers: 


   Altered mental status type: delirium   Qualified Code(s): R41.0 - 

Disorientation, unspecified   


Code(s): R41.82 - ALTERED MENTAL STATUS, UNSPECIFIED   





(4) Diabetes mellitus


Current Visit: No   Status: Acute   


Qualifiers: 


   Diabetes mellitus type: type 2   Diabetes mellitus complication status: with 

kidney complications   Diabetes mellitus complication detail: with chronic 

kidney disease   Chronic kidney disease stage: stage 3 (moderate) 


Code(s): E11.9 - TYPE 2 DIABETES MELLITUS WITHOUT COMPLICATIONS

## 2023-01-22 NOTE — PCM.DS
Discharge Summary


Date of Admission: 


01/20/23 22:23





Admitting Physician: 


RALEIGH GIL





Primary Care Provider: 


JOHN MARTINEZ








Allergies


Allergies





sulfamethoxazole [From Bactrim] Allergy (Verified 01/20/23 23:00)


   


   itching, SOB 


trimethoprim [From Bactrim] Allergy (Verified 01/20/23 23:00)


   


zinc Adverse Reaction (Mild, Verified 01/20/23 23:00)


   Rash


zinc oxide Adverse Reaction (Mild, Verified 01/20/23 23:00)


   Rash


satin tape Allergy (Uncoded 01/20/23 23:00)


   











Hospital Summary





- Hospital Course


Hospital Course: 








                                 Chief Complaint





Diagnosis                        passing out episode at home today





                                    Allergies











Allergy/AdvReac Type Severity Reaction Status Date / Time


 


sulfamethoxazole Allergy   Verified 01/20/23 23:00





[From Bactrim]     


 


trimethoprim [From Bactrim] Allergy   Verified 01/20/23 23:00


 


zinc AdvReac Mild Rash Verified 01/20/23 23:00


 


zinc oxide AdvReac Mild Rash Verified 01/20/23 23:00


 


satin tape Allergy   Uncoded 01/20/23 23:00








                           Vital Signs (Last 24 hours)











  Temp Pulse Resp BP Pulse Ox


 


 01/22/23 07:55  102.8 F  100 H  24  82/53  94 L


 


 01/22/23 07:10   99 H   


 


 01/22/23 06:37   99 H  24   94 L


 


 01/22/23 06:30  102.4 F  99 H  20  79/47  93 L


 


 01/22/23 06:00     93/53 


 


 01/22/23 05:55  102.2 F  97 H  20  107/71  93 L


 


 01/22/23 05:37  102.0 F  99 H  29 H  73/46  90 L


 


 01/22/23 04:35  101.7 F  97 H  27 H  94/65  93 L


 


 01/22/23 03:37  101.5 F  97 H  24  72/48  92 L


 


 01/22/23 02:33  101.5 F  91 H  28 H  97/62  91 L


 


 01/22/23 01:37  101.3 F  94 H  27 H  94/60  95


 


 01/22/23 01:00  101.1 F  95 H  32 H  91/55  91 L


 


 01/22/23 00:46  101.1 F  95 H  36 H  82/56  91 L


 


 01/22/23 00:30  101.1 F  95 H  28 H  66/42  94 L


 


 01/22/23 00:00  100.9 F  95 H  24  90/58  94 L


 


 01/21/23 23:30  100.9 F  90  22  90/48  95


 


 01/21/23 23:00  100.9 F  82  24  78/52  95


 


 01/21/23 22:48  100.9 F  91 H  24  70/57  95


 


 01/21/23 22:00  100.9 F  94 H  16  71/43  94 L


 


 01/21/23 21:00  100.9 F  89  20   93 L


 


 01/21/23 20:00  100.9 F  91 H  24  106/70  94 L


 


 01/21/23 18:43  100.8 F  91 H  17  95/72  96


 


 01/21/23 17:52  100.8 F  89  23  101/65  94 L


 


 01/21/23 17:45   90  20   95


 


 01/21/23 16:48     107/88 


 


 01/21/23 16:47  100.6 F  92 H  24  107/88  95


 


 01/21/23 15:54  100.6 F  84  24  85/68  94 L


 


 01/21/23 15:07     83/54 


 


 01/21/23 14:52  100.4 F  86  22  72/56  96


 


 01/21/23 14:00  100.4 F  86  21  85/56  95


 


 01/21/23 13:00  100.6 F  88  18  93/69  94 L


 


 01/21/23 12:00   82   


 


 01/21/23 11:54  100.6 F  82  20  80/55  95


 


 01/21/23 10:55  100.6 F  87  24  94/48  94 L


 


 01/21/23 09:56  100.6 F  80  20  92/57  95


 


 01/21/23 09:00  100.8 F  86  19  90/54  94 L


 


 01/21/23 08:00   84   89/55 








                                Home Medications











 Medication  Instructions  Recorded  Confirmed  Last Taken  Type


 


Clonidine HCl 0.1 mg*** [Clonidine 0.1 mg PO BID 01/20/23 01/20/23 01/20/23 

10:00 History





0.1 mg Tablet]     


 


Dapagliflozin Propanediol [Farxiga] 10 mg PO DAILY 01/20/23 01/20/23 01/20/23 

10:00 History


 


Gabapentin 600 mg PO BID 01/20/23 01/20/23 01/20/23 10:00 History


 


Linaclotide [Linzess] 290 mcg PO DAILY 01/20/23 01/20/23 01/20/23 10:00 History


 


Lisinopril 5 mg*** [Zestril 5 5 mg PO QHS 01/20/23 01/20/23 01/19/23 22:00 

History





MG***]     


 


Mirabegron [Myrbetriq] 50 mg PO DAILY 01/20/23 01/20/23 01/20/23 10:00 History


 


Omeprazole 20 mg PO DAILY 01/20/23 01/20/23 01/20/23 10:00 History


 


Ropinirole HCl 3 mg PO QHS 01/20/23 01/20/23 01/19/23 22:00 History


 


lisinopriL [Lisinopril] 20 mg PO QHS 01/20/23 01/20/23 01/19/23 22:00 History








                               Current Medications











Generic Name Dose Route Start Last Admin





  Trade Name Freq  PRN Reason Stop Dose Admin


 


Acetaminophen  975 mg  01/21/23 03:43  01/22/23 04:02





  Acetaminophen 325 Mg Tablet  PO  02/20/23 03:37  975 mg





  Q6H PRN PRN   Administration





  FEVER  


 


Aspirin  325 mg  01/21/23 14:00  01/21/23 13:50





  Aspirin 325 Mg Tablet.Ec  PO  02/20/23 13:59  Not Given





  DAILY ANTHONY  


 


Bisacodyl  5 mg  01/21/23 13:24 





  Bisacodyl 5 Mg Tablet.Ec  PO  02/20/23 13:23 





  BID PRN PRN  





  CONSTIPATION  


 


Bumetanide  2 mg  01/21/23 14:00  01/21/23 13:50





  Bumetanide 1 Mg Tablet  PO  02/20/23 13:59  Not Given





  DAILY ANTHONY  


 


Clonidine  0.1 mg  01/21/23 22:00  01/21/23 20:46





  Clonidine Hcl 0.1 Mg Tablet  PO  02/20/23 21:59  Not Given





  BID ANTHONY  


 


Enoxaparin Sodium  40 mg  01/21/23 10:00  01/21/23 09:43





  Enoxaparin Sodium*** 40 Mg/0.4 Ml Syringe  SQ  02/20/23 09:59  40 mg





  DAILY ANTHONY   Administration


 


Gabapentin  600 mg  01/21/23 14:00  01/21/23 22:12





  Gabapentin 300 Mg Capsule  PO  02/20/23 13:59  600 mg





  BID ANTHONY   Administration


 


Hydroxyzine HCl  25 mg  01/21/23 13:24 





  Hydroxyzine Hcl 25 Mg Tablet  PO  02/20/23 13:23 





  TIDPRN PRN  





  Leg Cramps  


 


Meropenem 1 gm/ Sodium  100 mls @ 200 mls/hr  01/20/23 22:39  01/22/23 05:03





  Chloride  IV  01/23/23 22:38  200 mls/hr





  Q8HT ANTHONY   Administration


 


Norepinephrine/Dextrose  8 mg in 250 mls @ 15 mls/hr  01/20/23 22:39  01/22/23 0

7:56





  Norepinephrine 8 Mg/250 Ml-D5w  IV  02/19/23 22:38  28 mcg/min





  .[id number] PRN   52.5 mls/hr





  HYPOTENSION   Titration





  Protocol  





  8 MCG/MIN  


 


Lactated Ringer's  1,000 mls @ 150 mls/hr  01/21/23 12:00  01/22/23 00:39





  Lactated Ringers  IV  02/20/23 11:59  150 mls/hr





  .Q6H40M ANTHONY   Administration


 


Metronidazole  500 mg in 100 mls @ 200 mls/hr  01/21/23 12:00  01/22/23 06:03





  Flagyl 500 Mg Ivpb  IV  02/20/23 11:59  200 mls/hr





  Q6HT ANTHONY   Administration


 


Lactated Ringer's  1,000 mls @ 999 mls/hr  01/22/23 07:26  01/22/23 07:48





  Lactated Ringers  IV  01/22/23 08:26  999 mls/hr





  .Q1H1M ONE   Administration


 


Lactated Ringer's  1,000 mls @ 999 mls/hr  01/22/23 07:49 





  Lactated Ringers  IV  01/22/23 08:49 





  .Q1H1M ONE  


 


Vancomycin HCl  1 gm in 200 mls @ 125 mls/hr  01/22/23 08:00 





  Vancomycin 1 Gram/200 Ml Bag  IV  01/22/23 09:35 





  ONCE ONE  


 


Ibuprofen  600 mg  01/21/23 03:44  01/22/23 07:34





  Ibuprofen 600 Mg Tablet  PO  02/20/23 03:38  600 mg





  Q6H PRN PRN   Administration





  FEVER  


 


Insulin Human Lispro  9 unit  01/21/23 16:30  01/22/23 07:03





  Insulin Lispro 1 Unit  SQ  02/20/23 16:29  Not Given





  AC ANTHONY  


 


Insulin Human Regular  0 unit  01/20/23 22:39 





  Insulin Regular, Human 1 Unit  SQ  02/19/23 22:38 





  UD PRN  





  HYPERGLYCEMIA  


 


Lisinopril  20 mg  01/21/23 22:00  01/21/23 20:48





  Lisinopril 20 Mg Tablet  PO  02/20/23 21:59  Not Given





  QHS ANTHONY  


 


Lisinopril  5 mg  01/21/23 22:00  01/21/23 20:48





  Lisinopril 5 Mg Tablet  PO  02/20/23 21:59  Not Given





  QHS ANTHONY  


 


Loperamide HCl  2 mg  01/21/23 11:55  01/22/23 07:34





  Loperamide Hcl 2 Mg Capsule  PO  02/20/23 11:54  2 mg





  PRN PRN   Administration





  DIARRHEA  


 


Mirabegron  50 mg  01/21/23 14:00  01/21/23 13:50





  Mirabegron 25 Mg Tab.Er.24h  PO  02/20/23 13:59  Not Given





  DAILY ANTHONY  


 


Miscellaneous Information  1 each  01/21/23 14:45 





  Medication Intervention 1 Each Each    02/20/23 14:44 





  .RN TO CHECK ANTHONY  


 


Miscellaneous Information  1 each  01/21/23 14:45 





  Medication Intervention 1 Each Each    02/20/23 14:44 





  .RN TO CHECK ANTHONY  


 


Nitroglycerin  0.4 mg  01/21/23 15:00  01/21/23 14:53





  Nitroglycerin 0.4 Mg Patch  TOP  02/20/23 14:59  Not Given





  DAILY ANTHONY  


 


Non-Formulary Medication  1 each  01/21/23 22:00  01/21/23 20:47





  Remove Patch 1 Each  TOP  02/20/23 21:59  Not Given





  HS ANTHONY  


 


Nystatin  0 gm  01/21/23 14:00  01/21/23 13:56





  Nystatin 15 Gm Powder  TOP  02/20/23 13:59  1 gm





  DAILY ANTHONY   Administration


 


Ondansetron HCl  4 mg  01/20/23 22:39 





  Ondansetron Hcl 4 Mg/2 Ml Vial  IV  02/19/23 22:38 





  Q6H PRN PRN  





  NAUSEA/VOMITING  


 


Pantoprazole Sodium  40 mg  01/21/23 14:00  01/21/23 13:56





  Protonix (Pantoprazole) 40 Mg Tablet  PO  02/20/23 13:59  40 mg





  DAILY ANTHONY   Administration


 


Ropinirole HCl  3 mg  01/21/23 22:00  01/21/23 22:12





  Ropinirole Hcl 2 Mg Tablet  PO  02/20/23 21:59  3 mg





  QHS ANTHONY   Administration


 


Fluticasone/Salmeterol  2 puff  01/21/23 07:00  01/22/23 06:35





  Fluticasone/Salmeterol 115/21 - 120 Puff Common Canister  IH  02/20/23 06:59  

2 puff





  BIDRT ANTHONY   Administration


 


Simvastatin  10 mg  01/21/23 22:00  01/21/23 22:12





  Simvastatin 10 Mg Tablet  PO  02/20/23 21:59  10 mg





  HS ANTHONY   Administration


 


Verapamil HCl  180 mg  01/21/23 22:00  01/21/23 20:46





  Verapamil Hcl Sr 180 Mg Tablet.Sa  PO  02/20/23 21:59  Not Given





  BID ANTHONY  














Discontinued Medications














Generic Name Dose Route Start Last Admin





  Trade Name Freq  PRN Reason Stop Dose Admin


 


Acetaminophen  650 mg  01/20/23 19:38  01/20/23 19:41





  Acetaminophen 325 Mg Tablet  PO  01/20/23 19:39  650 mg





  STAT STA   Administration


 


Acetaminophen  Confirm  01/20/23 19:41 





  Acetaminophen 325 Mg Tablet  Administered  01/20/23 19:42 





  Dose  





  650 mg  





  .ROUTE  





  .STK-MED ONE  


 


Acetaminophen  650 mg  01/20/23 22:39  01/21/23 00:10





  Acetaminophen 325 Mg Tablet  PO  02/19/23 22:38  650 mg





  Q4H PRN PRN   Administration





  PAIN, FEVER, HEADACHE  


 


Acetaminophen  Confirm  01/21/23 00:07 





  Acetaminophen 325 Mg Tablet  Administered  01/21/23 00:08 





  Dose  





  650 mg  





  .ROUTE  





  .STK-MED ONE  


 


Sodium Chloride  1,000 mls @ 100 mls/hr  01/20/23 17:00  01/20/23 17:30





  Sodium Chloride 0.9% 1000 Ml  IV  02/19/23 16:59  100 mls/hr





  .Q10H ANTHONY   Administration


 


Meropenem 1 gm/ Sodium  100 mls @ 200 mls/hr  01/20/23 18:09  01/20/23 18:24





  Chloride  IV  01/20/23 18:38  200 mls/hr





  STAT ONE   Administration


 


Norepinephrine/Dextrose  8 mg in 250 mls @ 15 mls/hr  01/20/23 18:09  01/20/23 

18:24





  Norepinephrine 8 Mg/250 Ml-D5w  IV  02/19/23 18:08  8 mcg/min





  .[id number] PRN   15 mls/hr





  HYPOTENSION   Administration





  Protocol  





  8 MCG/MIN  


 


Sodium Chloride  Confirm  01/20/23 18:15 





  Sodium Chloride 100ml Mini-Bag Plus  Administered  01/20/23 18:16 





  Dose  





  100 mls @ ud  





  IV  





  .STK-MED ONE  


 


Sodium Chloride  1,000 mls @ 999 mls/hr  01/20/23 18:59  01/20/23 20:48





  Sodium Chloride 0.9% 1000 Ml  IV  01/20/23 19:59  Infused





  .Q1H1M STA   Infusion


 


Sodium Chloride  1,000 mls @ 999 mls/hr  01/20/23 20:27  01/20/23 22:25





  Sodium Chloride 0.9% 1000 Ml  IV  01/20/23 21:27  Infused





  .Q1H1M STA   Infusion


 


Norepinephrine/Dextrose  Confirm  01/20/23 18:14 





  Norepinephrine 8 Mg/250 Ml-D5w  Administered  01/20/23 18:15 





  Dose  





  8 mg in 250 mls @ ud  





  IV  





  .STK-MED ONE  


 


Sodium Chloride  1,000 mls @ 125 mls/hr  01/20/23 22:39  01/21/23 05:47





  Sodium Chloride 0.9% 1000 Ml  IV  02/19/23 22:38  125 mls/hr





  .Q8H ANTHONY   Administration


 


Sodium Chloride  Confirm  01/21/23 01:00 





  Sodium Chloride 100ml Mini-Bag Plus  Administered  01/21/23 01:01 





  Dose  





  100 mls @ ud  





  IV  





  .STK-MED ONE  


 


Sodium Chloride  Confirm  01/20/23 17:29 





  Sodium Chloride 0.9% 1000 Ml  Administered  01/20/23 17:30 





  Dose  





  1,000 mls @ ud  





  .ROUTE  





  .STK-MED ONE  


 


Sodium Chloride  Confirm  01/20/23 19:31 





  Sodium Chloride 0.9% 1000 Ml  Administered  01/20/23 19:32 





  Dose  





  1,000 mls @ ud  





  .ROUTE  





  .STK-MED ONE  


 


Sodium Chloride  Confirm  01/20/23 20:47 





  Sodium Chloride 0.9% 1000 Ml  Administered  01/20/23 20:48 





  Dose  





  1,000 mls @ ud  





  .ROUTE  





  .STK-MED ONE  


 


Ibuprofen  600 mg  01/20/23 21:10  01/20/23 21:12





  Ibuprofen 600 Mg Tablet  PO  01/20/23 21:11  600 mg





  STAT ONE   Administration


 


Ibuprofen  Confirm  01/20/23 21:11 





  Ibuprofen 600 Mg Tablet  Administered  01/20/23 21:12 





  Dose  





  600 mg  





  .ROUTE  





  .STK-MED ONE  


 


Ibuprofen  600 mg  01/21/23 01:23 





  Ibuprofen 600 Mg Tablet  PO  02/20/23 01:22 





  TIDP PRN  





  MODERATE PAIN  


 


Meropenem  Confirm  01/20/23 18:14 





  Meropenem 1 Gm Vial  Administered  01/20/23 18:15 





  Dose  





  1 gm  





  IV  





  .STK-MED ONE  


 


Meropenem  Confirm  01/20/23 18:15 





  Meropenem 1 Gm Vial  Administered  01/20/23 18:16 





  Dose  





  1 gm  





  IV  





  .STK-MED ONE  


 


Meropenem  Confirm  01/21/23 01:00 





  Meropenem 1 Gm Vial  Administered  01/21/23 01:01 





  Dose  





  1 gm  





  IV  





  .STK-MED ONE  


 


Non-Formulary Medication  1 each  01/21/23 11:46  01/21/23 12:11





  Pharmacy Dosing Request  [facility]  01/21/23 11:47  1 each





  STAT ONE   Administration








                         Intake & Output (Last 24 hours)











 01/19/23 01/20/23 01/21/23 01/22/23





 11:59 11:59 11:59 11:59


 


Intake Total   5030 4567


 


Output Total   850 1275


 


Balance   4180 3292


 


Weight   139.5 kg 








                      Microbiology Results (Last 24 hours)





01/20/23 18:44   Blood   Blood Culture Gram Stain - Pending


01/20/23 18:44   Blood   Blood Culture - Preliminary


                            NO GROWTH TO DATE


01/20/23 16:40   Catherized   Urine Culture - Preliminary


                            GRAM NEGATIVE ID AND SENSITIVITY PENDING





                       Laboratory Results (Last 24 hours)











  01/22/23 01/22/23 01/22/23





  06:45 06:36 06:36


 


WBC    16.9 H


 


RBC    5.12


 


Hgb    14.6


 


Hct    45.8


 


MCV    89.5


 


MCH    28.5


 


MCHC    31.9 L


 


RDW    15.8 H


 


Plt Count    170


 


MPV    10.6


 


Gran %    83.3 H


 


Immature Gran % (Auto)    1.3 H


 


Nucleat RBC Rel Count    0.0


 


Eos # (Auto)    0.23


 


Immature Gran # (Auto)    0.22 H


 


Absolute Lymphs (auto)    1.10


 


Absolute Monos (auto)    1.19


 


Absolute Nucleated RBC    0.00


 


Lymphocytes %    6.5 L


 


Monocytes %    7.0


 


Eosinophils %    1.4


 


Basophils %    0.5


 


Absolute Granulocytes    14.08 H


 


Basophils #    0.09


 


Sodium   132 L 


 


Potassium   5.0 


 


Chloride   111 H 


 


Carbon Dioxide   10 L* 


 


Anion Gap   16.2 H 


 


BUN   56 H 


 


Creatinine   1.65 H 


 


Estimated GFR   32.0 


 


Glucose   160 H 


 


POC Glucometer  144 H  


 


Calcium   6.7 L 


 


Magnesium   2.9 H 


 


Total Bilirubin   0.80 


 


AST   64 H 


 


ALT   24 


 


Alkaline Phosphatase   86 


 


Serum Total Protein   5.5 L 


 


Albumin   2.8 L 














  01/21/23 01/21/23 01/21/23





  20:28 16:20 10:58


 


WBC   


 


RBC   


 


Hgb   


 


Hct   


 


MCV   


 


MCH   


 


MCHC   


 


RDW   


 


Plt Count   


 


MPV   


 


Gran %   


 


Immature Gran % (Auto)   


 


Nucleat RBC Rel Count   


 


Eos # (Auto)   


 


Immature Gran # (Auto)   


 


Absolute Lymphs (auto)   


 


Absolute Monos (auto)   


 


Absolute Nucleated RBC   


 


Lymphocytes %   


 


Monocytes %   


 


Eosinophils %   


 


Basophils %   


 


Absolute Granulocytes   


 


Basophils #   


 


Sodium   


 


Potassium   


 


Chloride   


 


Carbon Dioxide   


 


Anion Gap   


 


BUN   


 


Creatinine   


 


Estimated GFR   


 


Glucose   


 


POC Glucometer  144 H  160 H  163 H


 


Calcium   


 


Magnesium   


 


Total Bilirubin   


 


AST   


 


ALT   


 


Alkaline Phosphatase   


 


Serum Total Protein   


 


Albumin   








                             Orders (Last 24 hours)











 Category Date Time Status


 


 NPO except Meds Diet  01/21/23 07:49 Active


 


 CBC W DIFF AM.LAB Lab  01/22/23 06:36 Completed


 


 CMP AM.LAB Lab  01/22/23 06:36 Completed


 


 MAG [MAGNESIUM] AM.LAB Lab  01/22/23 06:36 Completed


 


 Manual Differential NC Routine Lab  01/22/23 06:36 Completed


 


 POCT GLUCOSE Stat Lab  01/21/23 10:58 Completed


 


 POCT GLUCOSE Stat Lab  01/21/23 16:20 Completed


 


 POCT GLUCOSE Stat Lab  01/21/23 20:28 Completed


 


 POCT GLUCOSE Stat Lab  01/22/23 06:45 Completed


 


 Aspirin  mg*** [Ecotrin 325 MG***] Med  01/21/23 14:00 Active





 325 mg PO DAILY   


 


 Bisacodyl 5 mg*** [Dulcolax 5 mg***] Med  01/21/23 13:24 Active





 5 mg PO BID PRN PRN   


 


 Bumetanide 1 mg*** [Bumex 1 mg***] Med  01/21/23 14:00 Active





 2 mg PO DAILY   


 


 Clonidine HCl 0.1 mg*** [Clonidine 0.1 mg Tablet] Med  01/21/23 22:00 Active





 0.1 mg PO BID   


 


 Enoxaparin Sodium*** [Enoxaparin Sodium] Med  01/21/23 10:00 Active





 40 mg SQ DAILY   


 


 Fluticasone/Salmeterol 115/21 [Advair Hfa 115/21 Common Med  01/21/23 07:00 

Active





 canister*]   





 2 puff IH BIDRT   


 


 Gabapentin *** [Neurontin ***] Med  01/21/23 14:00 Active





 600 mg PO BID   


 


 Hydroxyzine HCl 25 mg*** [Atarax 25 mg***] Med  01/21/23 13:24 Active





 25 mg PO TIDPRN PRN   


 


 Insulin Lispro [Humalog] Med  01/21/23 16:30 Active





 9 unit SQ AC   


 


 Lisinopril 20 mg*** [Zestril 20 MG***] Med  01/21/23 22:00 Active





 20 mg PO QHS   


 


 Lisinopril 5 mg*** [Zestril 5 MG***] Med  01/21/23 22:00 Active





 5 mg PO QHS   


 


 Loperamide HCl 2 mg*** [Imodium 2 mg***] Med  01/21/23 11:55 Active





 2 mg PO PRN PRN   


 


 Medication Intervention Med  01/21/23 14:45 Active





 1 each MC .RN TO CHECK   


 


 Medication Intervention Med  01/21/23 14:45 Active





 1 each MC .RN TO CHECK   


 


 Metronidazole 500 mg Premix [Flagyl 500 mg Ivpb] Med  01/21/23 12:00 Active





 500 mg in 100 ml IV Q6HT   


 


 Mirabegron [Myrbetriq] Med  01/21/23 14:00 Active





 50 mg PO DAILY   


 


 Nitroglycerin 0.4 mg/Hr*** [Nitro-Dur 0.4 MG/HR***] Med  01/21/23 15:00 Active





 0.4 mg TOP DAILY   


 


 Nystatin Powder 15 gm*** [Nystop Powder 15 gm***] Med  01/21/23 14:00 Active





 See Dose Instructions  TOP DAILY   


 


 PANTOPRAZOLE 40 mg Tablet*** [Protonix 40MG Tablet***] Med  01/21/23 14:00 

Active





 40 mg PO DAILY   


 


 Pharmacy Dosing Request Med  01/21/23 11:46 Discontinued





 1 each MC STAT ONE   


 


 Remove Patch [Remove Patch Reminder] Med  01/21/23 22:00 Active





 1 each TOP HS   


 


 Ringers Solution,Lactated [Lactated Ringers] 1,000 ml Med  01/21/23 12:00 

Active





  mls/hr   


 


 Ringers Solution,Lactated [Lactated Ringers] 1,000 ml Med  01/22/23 07:26 

Active





  mls/hr   


 


 Ringers Solution,Lactated [Lactated Ringers] 1,000 ml Med  01/22/23 07:49 

Active





  mls/hr   


 


 Ropinirole 2Mg*** [Requip 2Mg Tab***] Med  01/21/23 22:00 Active





 3 mg PO QHS   


 


 Simvastatin 10 mg** [Zocor 10MG**] Med  01/21/23 22:00 Active





 10 mg PO HS   


 


 Vancomycin/Water For Inj (Peg) [Vancomycin 1 Gram/200 Med  01/22/23 08:00 

Ordered





 ml Bag]   





 1 gm in 200 ml IV ONCE   


 


 Verapamil HCl Sr [Isoptin Sr] Med  01/21/23 22:00 Active





 180 mg PO BID   








                       Patient Care Notes (Last 24 hours)





01/22/23 07:52 Nursing Note by Magi Butler


large loose bm noted, bath given.





Initialized on 01/22/23 07:52 - END OF NOTE








01/22/23 07:22 Nursing Note by Magi Butler


pt is oriented x3 and updated on gravity of situation. pt agrees to transfer 

regardless of how far away, pt says " i wanna get better". 





Initialized on 01/22/23 07:22 - END OF NOTE








01/22/23 07:11 Nursing Note by Magi Butler dr updated on labs and vitals, orders to transfer to first available 

bed. at this time, [facility] are on diversion but [place] 

is finding an accepting hospitalist. 





Initialized on 01/22/23 07:11 - END OF NOTE








01/22/23 06:24 Nursing Note by Isabela Gonzalez


0685 for past hour patients respirations have changed although o2 sat has not.  

sat remains between 90-95.  pt having pursed lip breathing.  respiratory therapy

consulted and evaluating patient now





Initialized on 01/22/23 06:24 - END OF NOTE








01/21/23 11:45 (created 01/21/23 13:07) Nursing Note by Magi Butler dr at bedside rounding on pt. updated on pt continuous copious amounts 

of liquid diarrhea. new orders for lr @ 150, will add flagyl and imodium.





Initialized on 01/21/23 13:07 - END OF NOTE











I talked to Dr. Maciel at [facility] they accepted patient in ICU.  Patient 

blood pressure is 110/56.  Patient is otherwise alert arousable.  Patient still 

has some copious amount of diarrhea.  Patient is on Flagyl vancomycin and Merrem

at this point of time.  Blood culture has not been shown any growth.  Patient is

on Levophed 28 mcg/min.





We will transfer all the records with patient to [facility].





- Vitals & Intake/Output


Vital Signs: 





                                   Vital Signs











Temperature  102.8 F   01/22/23 07:55


 


Pulse Rate  100 H  01/22/23 07:55


 


Respiratory Rate  24   01/22/23 07:55


 


Blood Pressure  82/53   01/22/23 07:55


 


O2 Sat by Pulse Oximetry  94 L  01/22/23 07:55











Intake & Output: 





                                 Intake & Output











 01/19/23 01/20/23 01/21/23 01/22/23





 11:59 11:59 11:59 11:59


 


Intake Total   5030 4567


 


Output Total   850 1275


 


Balance   4180 3292


 


Weight   139.5 kg 














- Lab


Result Diagrams: 


                                 01/22/23 06:36





                                 01/22/23 06:36


Lab Results-Last 24 Hrs: 





                            Lab Results-Last 24 Hours











  01/21/23 01/21/23 01/21/23 Range/Units





  10:58 16:20 20:28 


 


WBC     (4.0-10.5)  x10^3/uL


 


RBC     (4.1-5.4)  x10^6/uL


 


Hgb     (12.0-16.0)  g/dL


 


Hct     (35-47)  %


 


MCV     ()  fL


 


MCH     (26-32)  pg


 


MCHC     (32-36)  g/dL


 


RDW     (11.5-14.0)  %


 


Plt Count     (150-450)  x10^3/uL


 


MPV     (7.5-11.0)  fL


 


Gran %     (36.0-66.0)  %


 


Immature Gran % (Auto)     (0.00-0.4)  %


 


Nucleat RBC Rel Count     (0.00-0.1)  %


 


Eos # (Auto)     (0-0.5)  x10^3/uL


 


Immature Gran # (Auto)     (0.00-0.03)  x10^3u/L


 


Absolute Lymphs (auto)     (1.0-4.6)  x10^3/uL


 


Absolute Monos (auto)     (0.0-1.3)  x10^3/uL


 


Absolute Nucleated RBC     (0.00-0.01)  x10^3u/L


 


Lymphocytes %     (24.0-44.0)  %


 


Monocytes %     (0.0-12.0)  %


 


Eosinophils %     (0.00-5.0)  %


 


Basophils %     (0.0-0.4)  %


 


Absolute Granulocytes     (1.4-6.9)  x10^3/uL


 


Basophils #     (0-0.4)  x10^3/uL


 


Sodium     (137-145)  mmol/L


 


Potassium     (3.5-5.1)  mmol/L


 


Chloride     ()  mmol/L


 


Carbon Dioxide     (22-30)  mmol/L


 


Anion Gap     (5-15)  MEQ/L


 


BUN     (7-17)  mg/dL


 


Creatinine     (0.52-1.04)  mg/dL


 


Estimated GFR     ML/MIN


 


Glucose     ()  mg/dL


 


POC Glucometer  163 H  160 H  144 H  (74 to 106)  mg/dL


 


Calcium     (8.4-10.2)  mg/dL


 


Magnesium     (1.6-2.3)  mg/dL


 


Total Bilirubin     (0.2-1.3)  mg/dL


 


AST     (14-36)  U/L


 


ALT     (0-35)  U/L


 


Alkaline Phosphatase     ()  U/L


 


Serum Total Protein     (6.3-8.2)  g/dL


 


Albumin     (3.5-5.0)  g/dL














  01/22/23 01/22/23 01/22/23 Range/Units





  06:36 06:36 06:45 


 


WBC  16.9 H    (4.0-10.5)  x10^3/uL


 


RBC  5.12    (4.1-5.4)  x10^6/uL


 


Hgb  14.6    (12.0-16.0)  g/dL


 


Hct  45.8    (35-47)  %


 


MCV  89.5    ()  fL


 


MCH  28.5    (26-32)  pg


 


MCHC  31.9 L    (32-36)  g/dL


 


RDW  15.8 H    (11.5-14.0)  %


 


Plt Count  170    (150-450)  x10^3/uL


 


MPV  10.6    (7.5-11.0)  fL


 


Gran %  83.3 H    (36.0-66.0)  %


 


Immature Gran % (Auto)  1.3 H    (0.00-0.4)  %


 


Nucleat RBC Rel Count  0.0    (0.00-0.1)  %


 


Eos # (Auto)  0.23    (0-0.5)  x10^3/uL


 


Immature Gran # (Auto)  0.22 H    (0.00-0.03)  x10^3u/L


 


Absolute Lymphs (auto)  1.10    (1.0-4.6)  x10^3/uL


 


Absolute Monos (auto)  1.19    (0.0-1.3)  x10^3/uL


 


Absolute Nucleated RBC  0.00    (0.00-0.01)  x10^3u/L


 


Lymphocytes %  6.5 L    (24.0-44.0)  %


 


Monocytes %  7.0    (0.0-12.0)  %


 


Eosinophils %  1.4    (0.00-5.0)  %


 


Basophils %  0.5    (0.0-0.4)  %


 


Absolute Granulocytes  14.08 H    (1.4-6.9)  x10^3/uL


 


Basophils #  0.09    (0-0.4)  x10^3/uL


 


Sodium   132 L   (137-145)  mmol/L


 


Potassium   5.0   (3.5-5.1)  mmol/L


 


Chloride   111 H   ()  mmol/L


 


Carbon Dioxide   10 L*   (22-30)  mmol/L


 


Anion Gap   16.2 H   (5-15)  MEQ/L


 


BUN   56 H   (7-17)  mg/dL


 


Creatinine   1.65 H   (0.52-1.04)  mg/dL


 


Estimated GFR   32.0   ML/MIN


 


Glucose   160 H   ()  mg/dL


 


POC Glucometer    144 H  (74 to 106)  mg/dL


 


Calcium   6.7 L   (8.4-10.2)  mg/dL


 


Magnesium   2.9 H   (1.6-2.3)  mg/dL


 


Total Bilirubin   0.80   (0.2-1.3)  mg/dL


 


AST   64 H   (14-36)  U/L


 


ALT   24   (0-35)  U/L


 


Alkaline Phosphatase   86   ()  U/L


 


Serum Total Protein   5.5 L   (6.3-8.2)  g/dL


 


Albumin   2.8 L   (3.5-5.0)  g/dL











Micro Results-Entire Visit: 





                                  Microbiology











 01/20/23 18:44 Blood Culture - Preliminary





 Blood    NO GROWTH TO DATE


 


 01/20/23 16:40 Urine Culture - Preliminary





 Catherized    GRAM NEGATIVE ID AND SENSITIVITY PENDING








                                   Accuchecks











Date                           01/21/23


 


Time                           21:00

















- Radiology Exams


Ordered Rad Exams-Entire Visit: 





                              Radiology Procedures











 Category Date Time Status


 


 ABDOMEN AND PELVIS W/0 CONTRAS [CT] Stat Exams  01/21/23 03:40 Completed


 


 CHEST WITH CONTRAST [CT] Stat Exams  01/20/23 19:42 Completed


 


 HEAD WITHOUT CONTRAST [CT] Stat Exams  01/20/23 16:53 Completed














- Procedures and Test


Procedures and Tests throughout Hospitalization: 





                            Therapy Orders & Screens





01/20/23 22:39


EKG REPEAT IN AM 


   Comment: 


Oxygen Nasal Cannula 2 lpm 


   Comment: 


Respiratory Therapy Consult ROUTINE 


   Comment: 


   Reason For Exam: 





01/20/23 23:50


Respiratory MDI UD 


   Comment: 





01/20/23 23:51


Respiratory Therapy Assessment DAILY 


   Comment: 














Discharge Exam


General Appearance: no apparent distress, moderate distress, alert


Neurologic Exam: alert, oriented x 3, No motor deficits


Eye Exam: PERRL, EOMI, eyes nml inspection


Ears, Nose, Throat Exam: normal ENT inspection, pharynx normal, moist mucous 

membranes


Neck Exam: normal inspection, non-tender, supple, full range of motion


Respiratory Exam: diminished breath sounds, No respiratory distress


Cardiovascular Exam: regular rate/rhythm, normal heart sounds


Gastrointestinal/Abdomen Exam: soft, No tenderness, No mass


Pelvic Exam: deferred


Rectal Exam: deferred


Back Exam: normal inspection, normal range of motion, No CVA tenderness, No 

vertebral tenderness


Extremity Exam: normal inspection, normal range of motion


Skin Exam: normal color, warm, dry





Final Diagnosis/Problem List





- Final Discharge Diagnosis/Problem


(1) Sepsis associated hypotension


Current Visit: Yes   Status: Acute   


Assessment & Plan: 





                                 Chief Complaint





Diagnosis                        passing out episode at home today





                                    Allergies











Allergy/AdvReac Type Severity Reaction Status Date / Time


 


sulfamethoxazole Allergy   Verified 01/20/23 23:00





[From Bactrim]     


 


trimethoprim [From Bactrim] Allergy   Verified 01/20/23 23:00


 


zinc AdvReac Mild Rash Verified 01/20/23 23:00


 


zinc oxide AdvReac Mild Rash Verified 01/20/23 23:00


 


satin tape Allergy   Uncoded 01/20/23 23:00








                           Vital Signs (Last 24 hours)











  Temp Pulse Resp BP Pulse Ox


 


 01/22/23 07:55  102.8 F  100 H  24  82/53  94 L


 


 01/22/23 07:10   99 H   


 


 01/22/23 06:37   99 H  24   94 L


 


 01/22/23 06:30  102.4 F  99 H  20  79/47  93 L


 


 01/22/23 06:00     93/53 


 


 01/22/23 05:55  102.2 F  97 H  20  107/71  93 L


 


 01/22/23 05:37  102.0 F  99 H  29 H  73/46  90 L


 


 01/22/23 04:35  101.7 F  97 H  27 H  94/65  93 L


 


 01/22/23 03:37  101.5 F  97 H  24  72/48  92 L


 


 01/22/23 02:33  101.5 F  91 H  28 H  97/62  91 L


 


 01/22/23 01:37  101.3 F  94 H  27 H  94/60  95


 


 01/22/23 01:00  101.1 F  95 H  32 H  91/55  91 L


 


 01/22/23 00:46  101.1 F  95 H  36 H  82/56  91 L


 


 01/22/23 00:30  101.1 F  95 H  28 H  66/42  94 L


 


 01/22/23 00:00  100.9 F  95 H  24  90/58  94 L


 


 01/21/23 23:30  100.9 F  90  22  90/48  95


 


 01/21/23 23:00  100.9 F  82  24  78/52  95


 


 01/21/23 22:48  100.9 F  91 H  24  70/57  95


 


 01/21/23 22:00  100.9 F  94 H  16  71/43  94 L


 


 01/21/23 21:00  100.9 F  89  20   93 L


 


 01/21/23 20:00  100.9 F  91 H  24  106/70  94 L


 


 01/21/23 18:43  100.8 F  91 H  17  95/72  96


 


 01/21/23 17:52  100.8 F  89  23  101/65  94 L


 


 01/21/23 17:45   90  20   95


 


 01/21/23 16:48     107/88 


 


 01/21/23 16:47  100.6 F  92 H  24  107/88  95


 


 01/21/23 15:54  100.6 F  84  24  85/68  94 L


 


 01/21/23 15:07     83/54 


 


 01/21/23 14:52  100.4 F  86  22  72/56  96


 


 01/21/23 14:00  100.4 F  86  21  85/56  95


 


 01/21/23 13:00  100.6 F  88  18  93/69  94 L


 


 01/21/23 12:00   82   


 


 01/21/23 11:54  100.6 F  82  20  80/55  95


 


 01/21/23 10:55  100.6 F  87  24  94/48  94 L


 


 01/21/23 09:56  100.6 F  80  20  92/57  95


 


 01/21/23 09:00  100.8 F  86  19  90/54  94 L








                                Home Medications











 Medication  Instructions  Recorded  Confirmed  Last Taken  Type


 


Clonidine HCl 0.1 mg*** [Clonidine 0.1 mg PO BID 01/20/23 01/20/23 01/20/23 

10:00 History





0.1 mg Tablet]     


 


Dapagliflozin Propanediol [Farxiga] 10 mg PO DAILY 01/20/23 01/20/23 01/20/23 

10:00 History


 


Gabapentin 600 mg PO BID 01/20/23 01/20/23 01/20/23 10:00 History


 


Linaclotide [Linzess] 290 mcg PO DAILY 01/20/23 01/20/23 01/20/23 10:00 History


 


Lisinopril 5 mg*** [Zestril 5 5 mg PO QHS 01/20/23 01/20/23 01/19/23 22:00 

History





MG***]     


 


Mirabegron [Myrbetriq] 50 mg PO DAILY 01/20/23 01/20/23 01/20/23 10:00 History


 


Omeprazole 20 mg PO DAILY 01/20/23 01/20/23 01/20/23 10:00 History


 


Ropinirole HCl 3 mg PO QHS 01/20/23 01/20/23 01/19/23 22:00 History


 


lisinopriL [Lisinopril] 20 mg PO QHS 01/20/23 01/20/23 01/19/23 22:00 History








                               Current Medications











Generic Name Dose Route Start Last Admin





  Trade Name Freq  PRN Reason Stop Dose Admin


 


Acetaminophen  975 mg  01/21/23 03:43  01/22/23 04:02





  Acetaminophen 325 Mg Tablet  PO  02/20/23 03:37  975 mg





  Q6H PRN PRN   Administration





  FEVER  


 


Aspirin  325 mg  01/21/23 14:00  01/21/23 13:50





  Aspirin 325 Mg Tablet.Ec  PO  02/20/23 13:59  Not Given





  DAILY ANTHONY  


 


Bisacodyl  5 mg  01/21/23 13:24 





  Bisacodyl 5 Mg Tablet.Ec  PO  02/20/23 13:23 





  BID PRN PRN  





  CONSTIPATION  


 


Bumetanide  2 mg  01/21/23 14:00  01/21/23 13:50





  Bumetanide 1 Mg Tablet  PO  02/20/23 13:59  Not Given





  DAILY ANTHONY  


 


Clonidine  0.1 mg  01/21/23 22:00  01/21/23 20:46





  Clonidine Hcl 0.1 Mg Tablet  PO  02/20/23 21:59  Not Given





  BID ANTHONY  


 


Enoxaparin Sodium  40 mg  01/21/23 10:00  01/21/23 09:43





  Enoxaparin Sodium*** 40 Mg/0.4 Ml Syringe  SQ  02/20/23 09:59  40 mg





  DAILY ANTHONY   Administration


 


Gabapentin  600 mg  01/21/23 14:00  01/21/23 22:12





  Gabapentin 300 Mg Capsule  PO  02/20/23 13:59  600 mg





  BID ANTHONY   Administration


 


Hydroxyzine HCl  25 mg  01/21/23 13:24 





  Hydroxyzine Hcl 25 Mg Tablet  PO  02/20/23 13:23 





  TIDPRN PRN  





  Leg Cramps  


 


Meropenem 1 gm/ Sodium  100 mls @ 200 mls/hr  01/20/23 22:39  01/22/23 05:03





  Chloride  IV  01/23/23 22:38  200 mls/hr





  Q8HT ANTHONY   Administration


 


Norepinephrine/Dextrose  8 mg in 250 mls @ 15 mls/hr  01/20/23 22:39  01/22/23 

07:56





  Norepinephrine 8 Mg/250 Ml-D5w  IV  02/19/23 22:38  28 mcg/min





  .[id number] PRN   52.5 mls/hr





  HYPOTENSION   Titration





  Protocol  





  8 MCG/MIN  


 


Lactated Ringer's  1,000 mls @ 150 mls/hr  01/21/23 12:00  01/22/23 00:39





  Lactated Ringers  IV  02/20/23 11:59  150 mls/hr





  .Q6H40M ANTHONY   Administration


 


Metronidazole  500 mg in 100 mls @ 200 mls/hr  01/21/23 12:00  01/22/23 06:03





  Flagyl 500 Mg Ivpb  IV  02/20/23 11:59  200 mls/hr





  Q6HT ANTHONY   Administration


 


Lactated Ringer's  1,000 mls @ 999 mls/hr  01/22/23 07:26  01/22/23 07:48





  Lactated Ringers  IV  01/22/23 08:26  999 mls/hr





  .Q1H1M ONE   Administration


 


Lactated Ringer's  1,000 mls @ 999 mls/hr  01/22/23 07:49 





  Lactated Ringers  IV  01/22/23 08:49 





  .Q1H1M ONE  


 


Vancomycin HCl  1 gm in 200 mls @ 125 mls/hr  01/22/23 08:00 





  Vancomycin 1 Gram/200 Ml Bag  IV  01/22/23 09:35 





  ONCE ONE  


 


Ibuprofen  600 mg  01/21/23 03:44  01/22/23 07:34





  Ibuprofen 600 Mg Tablet  PO  02/20/23 03:38  600 mg





  Q6H PRN PRN   Administration





  FEVER  


 


Insulin Human Lispro  9 unit  01/21/23 16:30  01/22/23 07:03





  Insulin Lispro 1 Unit  SQ  02/20/23 16:29  Not Given





  [facility]  


 


Insulin Human Regular  0 unit  01/20/23 22:39 





  Insulin Regular, Human 1 Unit  SQ  02/19/23 22:38 





  UD PRN  





  HYPERGLYCEMIA  


 


Lisinopril  20 mg  01/21/23 22:00  01/21/23 20:48





  Lisinopril 20 Mg Tablet  PO  02/20/23 21:59  Not Given





  QHS ANTHONY  


 


Lisinopril  5 mg  01/21/23 22:00  01/21/23 20:48





  Lisinopril 5 Mg Tablet  PO  02/20/23 21:59  Not Given





  QHS ANTHONY  


 


Loperamide HCl  2 mg  01/21/23 11:55  01/22/23 07:34





  Loperamide Hcl 2 Mg Capsule  PO  02/20/23 11:54  2 mg





  PRN PRN   Administration





  DIARRHEA  


 


Mirabegron  50 mg  01/21/23 14:00  01/21/23 13:50





  Mirabegron 25 Mg Tab.Er.24h  PO  02/20/23 13:59  Not Given





  DAILY ANTHONY  


 


Miscellaneous Information  1 each  01/21/23 14:45 





  Medication Intervention 1 Each Each    02/20/23 14:44 





  .RN TO CHECK ANTHONY  


 


Miscellaneous Information  1 each  01/21/23 14:45 





  Medication Intervention 1 Each Each    02/20/23 14:44 





  .RN TO CHECK ANTHONY  


 


Nitroglycerin  0.4 mg  01/21/23 15:00  01/21/23 14:53





  Nitroglycerin 0.4 Mg Patch  TOP  02/20/23 14:59  Not Given





  DAILY ANTHONY  


 


Non-Formulary Medication  1 each  01/21/23 22:00  01/21/23 20:47





  Remove Patch 1 Each  TOP  02/20/23 21:59  Not Given





  HS ANTHONY  


 


Nystatin  0 gm  01/21/23 14:00  01/21/23 13:56





  Nystatin 15 Gm Powder  TOP  02/20/23 13:59  1 gm





  DAILY ANTHONY   Administration


 


Ondansetron HCl  4 mg  01/20/23 22:39 





  Ondansetron Hcl 4 Mg/2 Ml Vial  IV  02/19/23 22:38 





  Q6H PRN PRN  





  NAUSEA/VOMITING  


 


Pantoprazole Sodium  40 mg  01/21/23 14:00  01/21/23 13:56





  Protonix (Pantoprazole) 40 Mg Tablet  PO  02/20/23 13:59  40 mg





  DAILY ANTHONY   Administration


 


Ropinirole HCl  3 mg  01/21/23 22:00  01/21/23 22:12





  Ropinirole Hcl 2 Mg Tablet  PO  02/20/23 21:59  3 mg





  QHS ANTHONY   Administration


 


Fluticasone/Salmeterol  2 puff  01/21/23 07:00  01/22/23 06:35





  Fluticasone/Salmeterol 115/21 - 120 Puff Common Canister  IH  02/20/23 06:59  

2 puff





  BIDRT ANTHONY   Administration


 


Simvastatin  10 mg  01/21/23 22:00  01/21/23 22:12





  Simvastatin 10 Mg Tablet  PO  02/20/23 21:59  10 mg





  HS ANTHONY   Administration


 


Verapamil HCl  180 mg  01/21/23 22:00  01/21/23 20:46





  Verapamil Hcl Sr 180 Mg Tablet.Sa  PO  02/20/23 21:59  Not Given





  BID ANTHONY  














Discontinued Medications














Generic Name Dose Route Start Last Admin





  Trade Name Freq  PRN Reason Stop Dose Admin


 


Acetaminophen  650 mg  01/20/23 19:38  01/20/23 19:41





  Acetaminophen 325 Mg Tablet  PO  01/20/23 19:39  650 mg





  STAT STA   Administration


 


Acetaminophen  Confirm  01/20/23 19:41 





  Acetaminophen 325 Mg Tablet  Administered  01/20/23 19:42 





  Dose  





  650 mg  





  .ROUTE  





  .STK-MED ONE  


 


Acetaminophen  650 mg  01/20/23 22:39  01/21/23 00:10





  Acetaminophen 325 Mg Tablet  PO  02/19/23 22:38  650 mg





  Q4H PRN PRN   Administration





  PAIN, FEVER, HEADACHE  


 


Acetaminophen  Confirm  01/21/23 00:07 





  Acetaminophen 325 Mg Tablet  Administered  01/21/23 00:08 





  Dose  





  650 mg  





  .ROUTE  





  .STK-MED ONE  


 


Sodium Chloride  1,000 mls @ 100 mls/hr  01/20/23 17:00  01/20/23 17:30





  Sodium Chloride 0.9% 1000 Ml  IV  02/19/23 16:59  100 mls/hr





  .Q10H ANTHONY   Administration


 


Meropenem 1 gm/ Sodium  100 mls @ 200 mls/hr  01/20/23 18:09  01/20/23 18:24





  Chloride  IV  01/20/23 18:38  200 mls/hr





  STAT ONE   Administration


 


Norepinephrine/Dextrose  8 mg in 250 mls @ 15 mls/hr  01/20/23 18:09  01/20/23 

18:24





  Norepinephrine 8 Mg/250 Ml-D5w  IV  02/19/23 18:08  8 mcg/min





  .[id number] PRN   15 mls/hr





  HYPOTENSION   Administration





  Protocol  





  8 MCG/MIN  


 


Sodium Chloride  Confirm  01/20/23 18:15 





  Sodium Chloride 100ml Mini-Bag Plus  Administered  01/20/23 18:16 





  Dose  





  100 mls @ ud  





  IV  





  .STK-MED ONE  


 


Sodium Chloride  1,000 mls @ 999 mls/hr  01/20/23 18:59  01/20/23 20:48





  Sodium Chloride 0.9% 1000 Ml  IV  01/20/23 19:59  Infused





  .Q1H1M STA   Infusion


 


Sodium Chloride  1,000 mls @ 999 mls/hr  01/20/23 20:27  01/20/23 22:25





  Sodium Chloride 0.9% 1000 Ml  IV  01/20/23 21:27  Infused





  .Q1H1M STA   Infusion


 


Norepinephrine/Dextrose  Confirm  01/20/23 18:14 





  Norepinephrine 8 Mg/250 Ml-D5w  Administered  01/20/23 18:15 





  Dose  





  8 mg in 250 mls @ ud  





  IV  





  .STK-MED ONE  


 


Sodium Chloride  1,000 mls @ 125 mls/hr  01/20/23 22:39  01/21/23 05:47





  Sodium Chloride 0.9% 1000 Ml  IV  02/19/23 22:38  125 mls/hr





  .Q8H ANTHONY   Administration


 


Sodium Chloride  Confirm  01/21/23 01:00 





  Sodium Chloride 100ml Mini-Bag Plus  Administered  01/21/23 01:01 





  Dose  





  100 mls @ ud  





  IV  





  .STK-MED ONE  


 


Sodium Chloride  Confirm  01/20/23 17:29 





  Sodium Chloride 0.9% 1000 Ml  Administered  01/20/23 17:30 





  Dose  





  1,000 mls @ ud  





  .ROUTE  





  .STK-MED ONE  


 


Sodium Chloride  Confirm  01/20/23 19:31 





  Sodium Chloride 0.9% 1000 Ml  Administered  01/20/23 19:32 





  Dose  





  1,000 mls @ ud  





  .ROUTE  





  .STK-MED ONE  


 


Sodium Chloride  Confirm  01/20/23 20:47 





  Sodium Chloride 0.9% 1000 Ml  Administered  01/20/23 20:48 





  Dose  





  1,000 mls @ ud  





  .ROUTE  





  .STK-MED ONE  


 


Ibuprofen  600 mg  01/20/23 21:10  01/20/23 21:12





  Ibuprofen 600 Mg Tablet  PO  01/20/23 21:11  600 mg





  STAT ONE   Administration


 


Ibuprofen  Confirm  01/20/23 21:11 





  Ibuprofen 600 Mg Tablet  Administered  01/20/23 21:12 





  Dose  





  600 mg  





  .ROUTE  





  .STK-MED ONE  


 


Ibuprofen  600 mg  01/21/23 01:23 





  Ibuprofen 600 Mg Tablet  PO  02/20/23 01:22 





  TIDP PRN  





  MODERATE PAIN  


 


Meropenem  Confirm  01/20/23 18:14 





  Meropenem 1 Gm Vial  Administered  01/20/23 18:15 





  Dose  





  1 gm  





  IV  





  .STK-MED ONE  


 


Meropenem  Confirm  01/20/23 18:15 





  Meropenem 1 Gm Vial  Administered  01/20/23 18:16 





  Dose  





  1 gm  





  IV  





  .STK-MED ONE  


 


Meropenem  Confirm  01/21/23 01:00 





  Meropenem 1 Gm Vial  Administered  01/21/23 01:01 





  Dose  





  1 gm  





  IV  





  .STK-MED ONE  


 


Non-Formulary Medication  1 each  01/21/23 11:46  01/21/23 12:11





  Pharmacy Dosing Request  [facility]  01/21/23 11:47  1 each





  STAT ONE   Administration








                         Intake & Output (Last 24 hours)











 01/19/23 01/20/23 01/21/23 01/22/23





 11:59 11:59 11:59 11:59


 


Intake Total   5030 4567


 


Output Total   850 6395


 


Balance   4180 7882


 


Weight   139.5 kg 








                      Microbiology Results (Last 24 hours)





01/20/23 18:44   Blood   Blood Culture Gram Stain - Pending


01/20/23 18:44   Blood   Blood Culture - Preliminary


                            NO GROWTH TO DATE


01/20/23 16:40   Catherized   Urine Culture - Preliminary


                            GRAM NEGATIVE ID AND SENSITIVITY PENDING





                       Laboratory Results (Last 24 hours)











  01/22/23 01/22/23 01/22/23





  06:45 06:36 06:36


 


WBC    16.9 H


 


RBC    5.12


 


Hgb    14.6


 


Hct    45.8


 


MCV    89.5


 


MCH    28.5


 


MCHC    31.9 L


 


RDW    15.8 H


 


Plt Count    170


 


MPV    10.6


 


Gran %    83.3 H


 


Immature Gran % (Auto)    1.3 H


 


Nucleat RBC Rel Count    0.0


 


Eos # (Auto)    0.23


 


Immature Gran # (Auto)    0.22 H


 


Absolute Lymphs (auto)    1.10


 


Absolute Monos (auto)    1.19


 


Absolute Nucleated RBC    0.00


 


Lymphocytes %    6.5 L


 


Monocytes %    7.0


 


Eosinophils %    1.4


 


Basophils %    0.5


 


Absolute Granulocytes    14.08 H


 


Basophils #    0.09


 


Sodium   132 L 


 


Potassium   5.0 


 


Chloride   111 H 


 


Carbon Dioxide   10 L* 


 


Anion Gap   16.2 H 


 


BUN   56 H 


 


Creatinine   1.65 H 


 


Estimated GFR   32.0 


 


Glucose   160 H 


 


POC Glucometer  144 H  


 


Calcium   6.7 L 


 


Magnesium   2.9 H 


 


Total Bilirubin   0.80 


 


AST   64 H 


 


ALT   24 


 


Alkaline Phosphatase   86 


 


Serum Total Protein   5.5 L 


 


Albumin   2.8 L 














  01/21/23 01/21/23 01/21/23





  20:28 16:20 10:58


 


WBC   


 


RBC   


 


Hgb   


 


Hct   


 


MCV   


 


MCH   


 


MCHC   


 


RDW   


 


Plt Count   


 


MPV   


 


Gran %   


 


Immature Gran % (Auto)   


 


Nucleat RBC Rel Count   


 


Eos # (Auto)   


 


Immature Gran # (Auto)   


 


Absolute Lymphs (auto)   


 


Absolute Monos (auto)   


 


Absolute Nucleated RBC   


 


Lymphocytes %   


 


Monocytes %   


 


Eosinophils %   


 


Basophils %   


 


Absolute Granulocytes   


 


Basophils #   


 


Sodium   


 


Potassium   


 


Chloride   


 


Carbon Dioxide   


 


Anion Gap   


 


BUN   


 


Creatinine   


 


Estimated GFR   


 


Glucose   


 


POC Glucometer  144 H  160 H  163 H


 


Calcium   


 


Magnesium   


 


Total Bilirubin   


 


AST   


 


ALT   


 


Alkaline Phosphatase   


 


Serum Total Protein   


 


Albumin   








                             Orders (Last 24 hours)











 Category Date Time Status


 


 NPO except Meds Diet  01/21/23 07:49 Active


 


 CBC W DIFF AM.LAB Lab  01/22/23 06:36 Completed


 


 CMP AM.LAB Lab  01/22/23 06:36 Completed


 


 MAG [MAGNESIUM] AM.LAB Lab  01/22/23 06:36 Completed


 


 Manual Differential NC Routine Lab  01/22/23 06:36 Completed


 


 POCT GLUCOSE Stat Lab  01/21/23 10:58 Completed


 


 POCT GLUCOSE Stat Lab  01/21/23 16:20 Completed


 


 POCT GLUCOSE Stat Lab  01/21/23 20:28 Completed


 


 POCT GLUCOSE Stat Lab  01/22/23 06:45 Completed


 


 Aspirin  mg*** [Ecotrin 325 MG***] Med  01/21/23 14:00 Active





 325 mg PO DAILY   


 


 Bisacodyl 5 mg*** [Dulcolax 5 mg***] Med  01/21/23 13:24 Active





 5 mg PO BID PRN PRN   


 


 Bumetanide 1 mg*** [Bumex 1 mg***] Med  01/21/23 14:00 Active





 2 mg PO DAILY   


 


 Clonidine HCl 0.1 mg*** [Clonidine 0.1 mg Tablet] Med  01/21/23 22:00 Active





 0.1 mg PO BID   


 


 Enoxaparin Sodium*** [Enoxaparin Sodium] Med  01/21/23 10:00 Active





 40 mg SQ DAILY   


 


 Gabapentin *** [Neurontin ***] Med  01/21/23 14:00 Active





 600 mg PO BID   


 


 Hydroxyzine HCl 25 mg*** [Atarax 25 mg***] Med  01/21/23 13:24 Active





 25 mg PO TIDPRN PRN   


 


 Insulin Lispro [Humalog] Med  01/21/23 16:30 Active





 9 unit SQ AC   


 


 Lisinopril 20 mg*** [Zestril 20 MG***] Med  01/21/23 22:00 Active





 20 mg PO QHS   


 


 Lisinopril 5 mg*** [Zestril 5 MG***] Med  01/21/23 22:00 Active





 5 mg PO QHS   


 


 Loperamide HCl 2 mg*** [Imodium 2 mg***] Med  01/21/23 11:55 Active





 2 mg PO PRN PRN   


 


 Medication Intervention Med  01/21/23 14:45 Active





 1 each MC .RN TO CHECK   


 


 Medication Intervention Med  01/21/23 14:45 Active





 1 each MC .RN TO CHECK   


 


 Metronidazole 500 mg Premix [Flagyl 500 mg Ivpb] Med  01/21/23 12:00 Active





 500 mg in 100 ml IV Q6HT   


 


 Mirabegron [Myrbetriq] Med  01/21/23 14:00 Active





 50 mg PO DAILY   


 


 Nitroglycerin 0.4 mg/Hr*** [Nitro-Dur 0.4 MG/HR***] Med  01/21/23 15:00 Active





 0.4 mg TOP DAILY   


 


 Nystatin Powder 15 gm*** [Nystop Powder 15 gm***] Med  01/21/23 14:00 Active





 See Dose Instructions  TOP DAILY   


 


 PANTOPRAZOLE 40 mg Tablet*** [Protonix 40MG Tablet***] Med  01/21/23 14:00 A

ctive





 40 mg PO DAILY   


 


 Pharmacy Dosing Request Med  01/21/23 11:46 Discontinued





 1 each MC STAT ONE   


 


 Remove Patch [Remove Patch Reminder] Med  01/21/23 22:00 Active





 1 each TOP HS   


 


 Ringers Solution,Lactated [Lactated Ringers] 1,000 ml Med  01/21/23 12:00 

Active





  mls/hr   


 


 Ringers Solution,Lactated [Lactated Ringers] 1,000 ml Med  01/22/23 07:26 

Active





  mls/hr   


 


 Ringers Solution,Lactated [Lactated Ringers] 1,000 ml Med  01/22/23 07:49 

Active





  mls/hr   


 


 Ropinirole 2Mg*** [Requip 2Mg Tab***] Med  01/21/23 22:00 Active





 3 mg PO QHS   


 


 Simvastatin 10 mg** [Zocor 10MG**] Med  01/21/23 22:00 Active





 10 mg PO HS   


 


 Vancomycin/Water For Inj (Peg) [Vancomycin 1 Gram/200 Med  01/22/23 08:00 

Ordered





 ml Bag]   





 1 gm in 200 ml IV ONCE   


 


 Verapamil HCl Sr [Isoptin Sr] Med  01/21/23 22:00 Active





 180 mg PO BID   








                       Patient Care Notes (Last 24 hours)





01/22/23 07:52 Nursing Note by Magi Butler loose bm noted, bath given.





Initialized on 01/22/23 07:52 - END OF NOTE








01/22/23 07:22 Nursing Note by Magi Butler


pt is oriented x3 and updated on gravity of situation. pt agrees to transfer 

regardless of how far away, pt says " i wanna get better". 





Initialized on 01/22/23 07:22 - END OF NOTE








01/22/23 07:11 Nursing Note by Magi Butler dr updated on labs and vitals, orders to transfer to first available 

bed. at this time, [facility] are on diversion but [place] 

is finding an accepting hospitalist. 





Initialized on 01/22/23 07:11 - END OF NOTE








01/22/23 06:24 Nursing Note by Isabela Gonzalez


0630 for past hour patients respirations have changed although o2 sat has not.  

sat remains between 90-95.  pt having pursed lip breathing.  respiratory therapy

consulted and evaluating patient now





Initialized on 01/22/23 06:24 - END OF NOTE








01/21/23 11:45 (created 01/21/23 13:07) Nursing Note by Magi Butler dr at bedside rounding on pt. updated on pt continuous copious amounts 

of liquid diarrhea. new orders for lr @ 150, will add flagyl and imodium.





Initialized on 01/21/23 13:07 - END OF NOTE














Code(s): A41.9 - SEPSIS, UNSPECIFIED ORGANISM; I95.9 - HYPOTENSION, UNSPECIFIED 

 





(2) Sigmoid diverticulitis


Current Visit: Yes   Status: Acute   


Assessment & Plan: 





                                Medication Report





Acetaminophen (Acetaminophen 325 Mg Tablet)  975 mg PO Q6H PRN PRN


   PRN Reason: FEVER


   Stop: 02/20/23 03:37


   Last Admin: 01/22/23 04:02  Dose: 975 mg


   Documented by: MICHAEL





MAR PAIN


 Document     01/22/23 04:02    (Rec: 01/22/23 04:02    [id number])


     Reassesment


      Pain Scale Used                            0-10 Pain Scale


      Comment                                    given for sustained


                                                 temperature


Re-Assess: Pain Reassessment


 Document     01/22/23 04:32    (Rec: 01/22/23 04:41    [id number])


     Pain Description


      Pain Scale Used                            0-10 Pain Scale


      Comment                                    given for fever





Aspirin (Aspirin 325 Mg Tablet.Ec)  325 mg PO DAILY ANTHONY


   Stop: 02/20/23 13:59


   Last Admin: 01/21/23 13:50 Dose:  Not Given


   Documented by: LIZBETH


   Non-Admin Reason: Patient Asleep





Bumetanide (Bumetanide 1 Mg Tablet)  2 mg PO DAILY ANTHONY


   Stop: 02/20/23 13:59


   Last Admin: 01/21/23 13:50 Dose:  Not Given


   Documented by: LIZBETH


   Non-Admin Reason: Decreased Blood Pressure





Clonidine (Clonidine Hcl 0.1 Mg Tablet)  0.1 mg PO BID ANTHONY


   Stop: 02/20/23 21:59


   Last Admin: 01/21/23 20:46 Dose:  Not Given


   Documented by: MS


   Non-Admin Reason: Decreased Blood Pressure





Enoxaparin Sodium (Enoxaparin Sodium*** 40 Mg/0.4 Ml Syringe)  40 mg SQ DAILY 

ANTHONY


   Stop: 02/20/23 09:59


   Last Admin: 01/21/23 09:43  Dose: 40 mg


   Documented by: LIZBETH





Gabapentin (Gabapentin 300 Mg Capsule)  600 mg PO BID ANTHONY


   Stop: 02/20/23 13:59


   Last Admin: 01/21/23 22:12  Dose: 600 mg


   Documented by: MS





Meropenem 1 gm/ Sodium (Chloride)  100 mls @ 200 mls/hr IV Q8HT [facility]


   Stop: 01/23/23 22:38


   Last Admin: 01/22/23 05:03  Dose: 200 mls/hr


   Documented by: MICHAEL





Norepinephrine/Dextrose (Norepinephrine 8 Mg/250 Ml-D5w)  8 mg in 250 mls @ 15 

mls/hr IV .H20C68G PRN; Protocol


   PRN Reason: HYPOTENSION


   Stop: 02/19/23 22:38


   Last Titration: 01/22/23 07:56  Dose: 28 mcg/min, 52.5 mls/hr


   Documented by: LIZBETH





Infusion/Titration


 Document     01/22/23 07:56  LIZBETH  (Rec: 01/22/23 07:56  LIZBETH  [id number])


     Dosing & Rate


      Titration Dose                             28


      IV Rate                                    52.5


      Increase/Decrease                          Running


      Cumulative Dose                            2.368


     IV Intake


      Container Volume                           70


      Infusion Intake                            46


      Cumulative Intake (Bag)                    180


      Cumulative Intake (Rx)                     830


      Volume Adjustment/Waste                    0





Lactated Ringer's (Lactated Ringers)  1,000 mls @ 150 mls/hr IV .Q6H40M [facility]


   Stop: 02/20/23 11:59


   Last Admin: 01/22/23 00:39  Dose: 150 mls/hr


   Documented by: MS





Infusion/Titration


 Document     01/22/23 00:39  MS  (Rec: 01/22/23 00:40  MS  [id number])


     Dosing & Rate


      IV Rate                                    150


      Increase/Decrease                          Started/Running


      Cumulative Dose                            Not Applicable


     IV Intake


      Container Volume                           1,000


      Cumulative Intake (Rx)                     1,753


      Volume Adjustment/Waste                    0





Metronidazole (Flagyl 500 Mg Ivpb)  500 mg in 100 mls @ 200 mls/hr IV Q6HT [facility]


   Stop: 02/20/23 11:59


   Last Admin: 01/22/23 06:03  Dose: 200 mls/hr


   Documented by: MICHAEL





Lactated Ringer's (Lactated Ringers)  1,000 mls @ 999 mls/hr IV .Q1H1M ONE


   Stop: 01/22/23 08:26


   Last Admin: 01/22/23 07:48  Dose: 999 mls/hr


   Documented by: LIZBETH





Infusion/Titration


 Document     01/22/23 07:48  LIZBETH  (Rec: 01/22/23 07:48  LIZBETH  [id number])


     Dosing & Rate


      IV Rate                                    999


      Increase/Decrease                          Started


      Cumulative Dose                            Not Applicable


     IV Intake


      Container Volume                           1,000


      Volume Adjustment/Waste                    0





Ibuprofen (Ibuprofen 600 Mg Tablet)  600 mg PO Q6H PRN PRN


   PRN Reason: FEVER


   Stop: 02/20/23 03:38


   Last Admin: 01/22/23 07:34  Dose: 600 mg


   Documented by: LIZBETH





Insulin Human Lispro (Insulin Lispro 1 Unit)  9 unit SQ AC [facility]


   Stop: 02/20/23 16:29


   Last Admin: 01/22/23 07:03 Dose:  Not Given


   Documented by: LIZBETH


   Non-Admin Reason: Low Glucose





MAR GLUCOSE CHECK


 Document     01/22/23 07:03  LIZBETH  (Rec: 01/22/23 07:03  LIZBETH  [id number])


     POCT Blood Glucose


      POCT Glucose (Last Value)                  144 mg/dL (74 to 106)  H





Lisinopril (Lisinopril 20 Mg Tablet)  20 mg PO QHS [facility]


   Stop: 02/20/23 21:59


   Last Admin: 01/21/23 20:48 Dose:  Not Given


   Documented by: MS


   Non-Admin Reason: Decreased Blood Pressure





Lisinopril (Lisinopril 5 Mg Tablet)  5 mg PO QHS [facility]


   Stop: 02/20/23 21:59


   Last Admin: 01/21/23 20:48 Dose:  Not Given


   Documented by: MS


   Non-Admin Reason: Decreased Blood Pressure





Loperamide HCl (Loperamide Hcl 2 Mg Capsule)  2 mg PO PRN PRN


   PRN Reason: DIARRHEA


   Stop: 02/20/23 11:54


   Last Admin: 01/22/23 07:34  Dose: 2 mg


   Documented by: LIZBETH





Mirabegron (Mirabegron 25 Mg Tab.Er.24h)  50 mg PO DAILY [facility]


   Stop: 02/20/23 13:59


   Last Admin: 01/21/23 13:50 Dose:  Not Given


   Documented by: LIZBETH


   Non-Admin Reason: aguilar





Nitroglycerin (Nitroglycerin 0.4 Mg Patch)  0.4 mg TOP DAILY [facility]


   Stop: 02/20/23 14:59


   Last Admin: 01/21/23 14:53 Dose:  Not Given


   Documented by: LIZBETH


   Non-Admin Reason: Decreased Blood Pressure





Non-Formulary Medication (Remove Patch 1 Each)  1 each TOP HS AN[facility]ONY


   Stop: 02/20/23 21:59


   Last Admin: 01/21/23 20:47 Dose:  Not Given


   Documented by: MS


   Non-Admin Reason: no patch in place





Nystatin (Nystatin 15 Gm Powder)  0 gm TOP DAILY [facility]


   Stop: 02/20/23 13:59


   Last Admin: 01/21/23 13:56  Dose: 1 gm


   Documented by: LIZBETH





MAR TOPICAL APPLICATION SITE


 Document     01/21/23 13:56  LIZBETH  (Rec: 01/21/23 13:57  LIZBETH  [id number])


     Application Site


      Other                                      uynder breawsts





Pantoprazole Sodium (Protonix (Pantoprazole) 40 Mg Tablet)  40 mg PO DAILY AN[facility]ONY


   Stop: 02/20/23 13:59


   Last Admin: 01/21/23 13:56  Dose: 40 mg


   Documented by: LIZBETH





Ropinirole HCl (Ropinirole Hcl 2 Mg Tablet)  3 mg PO QHS [facility]


   Stop: 02/20/23 21:59


   Last Admin: 01/21/23 22:12  Dose: 3 mg


   Documented by: MS





Fluticasone/Salmeterol (Fluticasone/Salmeterol 115/21 - 120 Puff Common 

Canister)  2 puff IH BIDRT ANTHONY


   Stop: 02/20/23 06:59


   Last Admin: 01/22/23 06:35  Dose: 2 puff


   Documented by: MERYL Farrell MDI


 Document     01/22/23 06:35  MERYL  (Rec: 01/22/23 06:37  MERYL  

[id number])


     MDI


      MDI                                        Initial MDI


      Advair 115/21                              2 Puffs


      Spacer Used                                Yes


      Rinsed Mouth After MDI-RT                  Yes





Simvastatin (Simvastatin 10 Mg Tablet)  10 mg PO HS AN[facility]ONY


   Stop: 02/20/23 21:59


   Last Admin: 01/21/23 22:12  Dose: 10 mg


   Documented by: MS





Verapamil HCl (Verapamil Hcl Sr 180 Mg Tablet.Sa)  180 mg PO BID AN[facility]Y


   Stop: 02/20/23 21:59


   Last Admin: 01/21/23 20:46 Dose:  Not Given


   Documented by: MS


   Non-Admin Reason: Decreased Blood Pressure





Discontinued Medications





Acetaminophen (Acetaminophen 325 Mg Tablet)  650 mg PO STAT STA


   Stop: 01/20/23 19:39


   Last Admin: 01/20/23 19:41  Dose: 650 mg


   Documented by: CHANDRA





Medication Administration (PO


 Document     01/20/23 19:41  KB  (Rec: 01/20/23 19:42  KB  [id number])


     Medication Administration


      PO Med Administration                      Yes


MAR PAIN


 Document     01/20/23 19:41  KB  (Rec: 01/20/23 19:42  KB  [id number])


     Reassesment


      Pain Scale Used                            0-10 Pain Scale


      Pain Intensity (0-10)                      2





Acetaminophen (Acetaminophen 325 Mg Tablet)  650 mg PO Q4H PRN PRN


   PRN Reason: PAIN, FEVER, HEADACHE


   Stop: 02/19/23 22:38


   Last Admin: 01/21/23 00:10  Dose: 650 mg


   Documented by: MS JAIN PAIN


 Document     01/21/23 00:10  MS  (Rec: 01/21/23 00:10  MS  [id number])


     Reassesment


      Comment                                    fever 102.2


Re-Assess: Pain Reassessment


 Document     01/21/23 00:40  MS  (Rec: 01/21/23 01:10  MS  [id number])


     Pain Description


      Comment                                    TEMP 102.6





Sodium Chloride (Sodium Chloride 0.9% 1000 Ml)  1,000 mls @ 100 mls/hr IV .Q10H 

ANTHONY


   Stop: 02/19/23 16:59


   Last Admin: 01/20/23 17:30  Dose: 100 mls/hr


   Documented by: CHANDRA





Med Admininistration (IV,IVP)


 Document     01/20/23 17:30  KB  (Rec: 01/20/23 17:30  KB  [id number])


     Type of Administration


      Initial IV Push                            No


Infusion/Titration


 Document     01/20/23 17:30  KB  (Rec: 01/20/23 17:30  KB  [id number])


     Dosing & Rate


      IV Rate                                    100


      Increase/Decrease                          Started


      Cumulative Dose                            Not Applicable


     IV Intake


      Container Volume                           1,000


      Volume Adjustment/Waste                    0





Meropenem 1 gm/ Sodium (Chloride)  100 mls @ 200 mls/hr IV STAT ONE


   Stop: 01/20/23 18:38


   Last Admin: 01/20/23 18:24  Dose: 200 mls/hr


   Documented by: SAM





Med Admininistration (IV,IVP)


 Document     01/20/23 18:24  SAM  (Rec: 01/20/23 18:24  SAM  [id number])


     Type of Administration


      Initial IV Push                            No


      IV Push-Addtl Different Drug               Yes





Norepinephrine/Dextrose (Norepinephrine 8 Mg/250 Ml-D5w)  8 mg in 250 mls @ 15 

mls/hr IV .S47E52L PRN; Protocol


   PRN Reason: HYPOTENSION


   Stop: 02/19/23 18:08


   Last Admin: 01/20/23 18:24  Dose: 8 mcg/min, 15 mls/hr


   Documented by: SAM





Med Admininistration (IV,IVP)


 Document     01/20/23 18:24  SAM  (Rec: 01/20/23 18:24  SAM  [id number])


     Type of Administration


      Initial IV Push                            No


Infusion/Titration


 Document     01/20/23 18:24  SAM  (Rec: 01/20/23 18:24  SAM  [id number])


     Dosing & Rate


      Titration Dose                             8


      IV Rate                                    15


      Increase/Decrease                          Started


     IV Intake


      Container Volume                           250


      Volume Adjustment/Waste                    0





Sodium Chloride (Sodium Chloride 0.9% 1000 Ml)  1,000 mls @ 999 mls/hr IV .Q1H1M

[facility]


   Stop: 01/20/23 19:59


   Last Infusion: 01/20/23 20:48  Dose: 0 mls/hr


   Documented by: MM





Infusion/Titration


 Document     01/20/23 20:48  MM  (Rec: 01/20/23 20:48  MM  [id number])


     Dosing & Rate


      IV Rate                                    0


      Increase/Decrease                          Infused


      Cumulative Dose                            Not Applicable


     IV Intake


      Container Volume                           0


      Infusion Intake                            1,000


      Cumulative Intake (Bag)                    1,000


      Cumulative Intake (Rx)                     1,000


      Volume Adjustment/Waste                    0





Sodium Chloride (Sodium Chloride 0.9% 1000 Ml)  1,000 mls @ 999 mls/hr IV .Q1H1M

[facility]


   Stop: 01/20/23 21:27


   Last Infusion: 01/20/23 22:25  Dose: 0 mls/hr


   Documented by: MM





Infusion/Titration


 Document     01/20/23 22:25  MM  (Rec: 01/20/23 22:25  MM  [id number])


     Dosing & Rate


      IV Rate                                    0


      Increase/Decrease                          Infused


      Cumulative Dose                            Not Applicable


     IV Intake


      Container Volume                           0


      Infusion Intake                            1,000


      Cumulative Intake (Bag)                    1,000


      Cumulative Intake (Rx)                     1,000


      Volume Adjustment/Waste                    0





Sodium Chloride (Sodium Chloride 0.9% 1000 Ml)  1,000 mls @ 125 mls/hr IV .Q8H 

[facility]Y


   Stop: 02/19/23 22:38


   Last Admin: 01/21/23 05:47  Dose: 125 mls/hr


   Documented by: MS





Infusion/Titration


 Document     01/21/23 05:47  MS  (Rec: 01/21/23 05:47  MS  [id number])


     Dosing & Rate


      IV Rate                                    125


      Increase/Decrease                          Started/Running


      Cumulative Dose                            Not Applicable


     IV Intake


      Container Volume                           1,000


      Cumulative Intake (Rx)                     704


      Volume Adjustment/Waste                    0





Ibuprofen (Ibuprofen 600 Mg Tablet)  600 mg PO STAT ONE


   Stop: 01/20/23 21:11


   Last Admin: 01/20/23 21:12  Dose: 600 mg


   Documented by: MM





Medication Administration (PO


 Document     01/20/23 21:12  MM  (Rec: 01/20/23 21:12  MM  [id number])


     Medication Administration


      PO Med Administration                      Yes


MAR PAIN


 Document     01/20/23 21:12  MM  (Rec: 01/20/23 21:12  MM  [id number])


     Reassesment


      Comment                                    fever





Non-Formulary Medication (Pharmacy Dosing Request)  1 each MC STAT ONE


   Stop: 01/21/23 11:47


   Last Admin: 01/21/23 12:11  Dose: 1 each


   Documented by: LIZBETH








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   





(3) Altered mental status


Current Visit: Yes   Status: Acute   Code(s): R41.82 - ALTERED MENTAL STATUS, 

UNSPECIFIED   





(4) Diabetes mellitus


Current Visit: No   Status: Acute   Code(s): E11.9 - TYPE 2 DIABETES MELLITUS 

WITHOUT COMPLICATIONS   





- Discharge


Disposition: DC TO [facility]


Condition: Fair


Prescriptions: 


No Action


   Nitroglycerin [Nitroglycerin Patch] 1 patch TOP DAILY


   Pravastatin Sodium 20 mg PO QHS


   Verapamil HCl Sr [Isoptin Sr] 180 mg PO BID


   Bumetanide [Bumex] 2 mg PO DAILY


   Aspirin  mg*** [Ecotrin 325 MG***] 325 mg PO DAILY


   Bisacodyl 5 mg*** [Dulcolax 5 mg***] 5 mg PO BID PRN PRN


     PRN Reason: Constipation


   Nystatin 1 applic TOP DAILY


   Hydroxyzine HCl 25 mg*** [Atarax 25 mg***] 25 mg PO TIDPRN PRN


     PRN Reason: Leg Cramps


   Insulin Aspart** [NovoLOG Insulin**] 9 units SQ AC


   lisinopriL [Lisinopril] 20 mg PO QHS


   Lisinopril 5 mg*** [Zestril 5 MG***] 5 mg PO QHS


   Ropinirole HCl 3 mg PO QHS


   Gabapentin 600 mg PO BID


   Clonidine HCl 0.1 mg*** [Clonidine 0.1 mg Tablet] 0.1 mg PO BID


   Omeprazole 20 mg PO DAILY


   Mirabegron [Myrbetriq] 50 mg PO DAILY


   Dapagliflozin Propanediol [Farxiga] 10 mg PO DAILY


   Linaclotide [Linzess] 290 mcg PO DAILY


Follow up with: 


JOHN MARTINEZ MD [Primary Care Provider] -

## 2023-08-08 NOTE — PCM.HP
History of Present Illness





- Chief Complaint


Chief Complaint: passing out episode at home today


History of Present Illness: 


 is a 77 year old female.who is transported to the emergency department 

by EMS because of "passing out.  The patient arrives awake and alert for the 

most part.  She may be mildly lethargic.  However she wakes up quickly and can 

stay awake and alert while answering questions.  Apparently, she was at home and

doing well this morning.  She even drove her family member to appointments.  

However, this afternoon she started to feel ill and felt like she was going to 

vomit and have diarrhea.  She did not vomit and she did not have diarrhea.  

However she "passed out" on the toilet.  The service was contacted.  She was 

very lethargic.  She arrives to the emergency department without chest pain and 

denies shortness of breath.  Her blood sugar level in the emergency department 

is 227.  Patient has a history of hyperlipidemia, hypertension, insulin-

dependent diabetes, sleep apnea and chronic angina.  She also denies abdominal 

pain at this time.


Timing/Duration: today


Severity: mild


Associated Symptoms: nausea (To moderate), malaise, weakness, No vomiting, No 

abdominal pain, No shortness of breath, No chest pain








- Review of Systems


Constitutional: Fever, Chills, Lethargy, Malaise, No Weakness


Eyes: No Symptoms


Ears, Nose, & Throat: No Symptoms


Respiratory: Cough, Orthopnea, Short Of Breath


Cardiac: Orthopnea, PND, No Chest Pain, No Edema, No Syncope


Abdominal/Gastrointestinal: No Abdominal Pain, No Nausea, No Vomiting, No 

Diarrhea


Genitourinary Symptoms: No Dysuria


Musculoskeletal: No Back Pain, No Neck Pain


Skin: No Rash


Neurological: No Dizziness, No Focal Weakness, No Sensory Changes


Psychological: No Symptoms


Endocrine: No Symptoms


Hematologic/Lymphatic: No Symptoms


Immunological/Allergic: No Symptoms





Medications & Allergies


Home Medications: 


                              Home Medication List





Nitroglycerin [Nitroglycerin Patch] 1 patch TOP DAILY 09/05/14 [History 

Confirmed 01/20/23]


Pravastatin Sodium 20 mg PO QHS 09/05/14 [History Confirmed 01/20/23]


Aspirin  mg*** [Ecotrin 325 MG***] 325 mg PO DAILY 10/15/14 [History 

Confirmed 01/20/23]


Bumetanide [Bumex] 2 mg PO DAILY 10/15/14 [History Confirmed 01/20/23]


Verapamil HCl Sr [Isoptin Sr] 180 mg PO BID 10/15/14 [History Confirmed 

01/20/23]


Bisacodyl 5 mg*** [Dulcolax 5 mg***] 5 mg PO BID PRN PRN 05/05/17 [History 

Confirmed 01/20/23]


Hydroxyzine HCl 25 mg*** [Atarax 25 mg***] 25 mg PO TIDPRN PRN 05/05/17 [History

 Confirmed 01/20/23]


Insulin Aspart** [NovoLOG Insulin**] 9 units SQ AC 05/05/17 [History Confirmed 

01/20/23]


Nystatin 1 applic TOP DAILY 05/05/17 [History Confirmed 01/20/23]


Clonidine HCl 0.1 mg*** [Clonidine 0.1 mg Tablet] 0.1 mg PO BID 01/20/23 

[History Confirmed 01/20/23]


Dapagliflozin Propanediol [Farxiga] 10 mg PO DAILY 01/20/23 [History Confirmed 

01/20/23]


Gabapentin 600 mg PO BID 01/20/23 [History Confirmed 01/20/23]


Linaclotide [Linzess] 290 mcg PO DAILY 01/20/23 [History Confirmed 01/20/23]


Lisinopril 5 mg*** [Zestril 5 MG***] 5 mg PO QHS 01/20/23 [History Confirmed 

01/20/23]


Mirabegron [Myrbetriq] 50 mg PO DAILY 01/20/23 [History Confirmed 01/20/23]


Omeprazole 20 mg PO DAILY 01/20/23 [History Confirmed 01/20/23]


Ropinirole HCl 3 mg PO QHS 01/20/23 [History Confirmed 01/20/23]


lisinopriL [Lisinopril] 20 mg PO QHS 01/20/23 [History Confirmed 01/20/23]








Allergies/Adverse Reactions: 


                                    Allergies











Allergy/AdvReac Type Severity Reaction Status Date / Time


 


sulfamethoxazole Allergy   Verified 01/20/23 23:00





[From Bactrim]     


 


trimethoprim [From Bactrim] Allergy   Verified 01/20/23 23:00


 


zinc AdvReac Mild Rash Verified 01/20/23 23:00


 


zinc oxide AdvReac Mild Rash Verified 01/20/23 23:00


 


satin tape Allergy   Uncoded 01/20/23 23:00














- Past Medical History


Past Medical History: Yes


Neurological History: Peripheral Neuropathy


ENT History: No Pertinent History


Cardiac History: Angina, High Cholesterol, Hypertension


Respiratory History: COPD, Sleep Apnea


Endocrine Medical History: Diabetes Type II


Musculoskelatal History: Fractures, Osteoarthritis, Other


GI Medical History: GERD, Other


 History: Renal Disease


Pyscho-Social History: No Pertinent History


Reproductive Disorders: No Pertinent History


Comment: hx bilateral leg wounds in past, scoliosis of lumbar spine





- Female History


Are you pregnant now?: No





- Past Surgical History


Past Surgical History: Yes


Neuro Surgical History: No Pertinent History


Cardiac History: No Pertinent History


Respiratory Surgery: No Pertinent History


GI Surgical History: Appendectomy


Genitourinary Surgical Hx: No Pertinent History


Musculskeletal Surgical Hx: Other


Female Surgical History: Hysterectomy, Other


Other Surgical History: D&C.  right heel spur removed.  gastric sleeve 2018 with

140 pound weight loss





- Social History


Smoking Status: Never smoker


Exposure to second hand smoke: Yes


Alcohol: None


Drug Use: none





- Physical Exam


Vital Signs: 


                               Vital Signs - 24 hr











  Temp Pulse Resp BP Pulse Ox


 


 01/21/23 09:56  100.6 F  80  20  92/57  95


 


 01/21/23 09:00  100.8 F  86  19  90/54  94 L


 


 01/21/23 08:00   84   89/55 


 


 01/21/23 07:45  101.5 F  85  20  113/62  96


 


 01/21/23 06:56   87  20   96


 


 01/21/23 06:50  101.5 F  82  21  80/59  97


 


 01/21/23 06:24  101.5 F  85  20  76/55  96


 


 01/21/23 05:54  101.7 F  83  22  90/67  95


 


 01/21/23 04:54  102.4 F  84  24  74/47  94 L


 


 01/21/23 04:00  102.4 F  83  24  81/60  94 L


 


 01/21/23 03:00  102.4 F  86  27 H  90/48  95


 


 01/21/23 02:00  102.4 F  86  25 H  76/43  96


 


 01/21/23 01:32  102.8 F  88  26 H  90/61  93 L


 


 01/21/23 00:55  102.6 F  85  24  98/67  93 L


 


 01/21/23 00:00   88  24  94/56  95


 


 01/20/23 23:40   89  19   96


 


 01/20/23 23:37  101.8 F  90  20  118/65  98


 


 01/20/23 23:30   86  19  109/69  95


 


 01/20/23 23:00   87  23  132/74  96


 


 01/20/23 22:40     125/80 


 


 01/20/23 22:04   87  15  131/89 


 


 01/20/23 21:01  100.8 F  95 H  22  87/71  96


 


 01/20/23 20:50   97 H  22  95/83  96


 


 01/20/23 20:26   101 H  28 H  122/86 


 


 01/20/23 19:26   95 H  17  119/69 


 


 01/20/23 19:13  98.4 F  96 H  15  


 


 01/20/23 18:11  97.0 F  86  20  70/41  97


 


 01/20/23 17:38  97.0 F  80  22  89/43  97


 


 01/20/23 16:37   95 H  22  96/80  97











General Appearance: no apparent distress, alert


Neurologic Exam: alert, oriented x 3, cooperative, normal mood/affect, nml 

cerebellar function, nml station & gait, sensation nml, No motor deficits


Eye Exam: PERRL/EOMI, eyes nml inspection


Ears, Nose, Throat Exam: normal ENT inspection, TMs normal, pharynx normal, 

moist mucous membranes


Neck Exam: normal inspection, non-tender, supple, full range of motion


Respiratory Exam: diminished breath sounds, No respiratory distress


Cardiovascular Exam: regular rate/rhythm, normal heart sounds, normal peripheral

pulses


Gastrointestinal/Abdomen Exam: soft, normal bowel sounds, No tenderness, No mass


Back Exam: normal inspection, normal range of motion, No CVA tenderness, No 

vertebral tenderness


Extremity Exam: normal inspection, normal range of motion, pelvis stable


Skin Exam: normal color, warm, dry, No rash


Lymphatic Exam: No adenopathy





Results





- Labs


Lab/Micro Results: 


                            Lab Results-Last 24 Hours











  01/20/23 01/20/23 01/20/23 Range/Units





  16:40 16:40 16:40 


 


WBC  21.3 H    (4.0-10.5)  x10^3/uL


 


RBC  5.17    (4.1-5.4)  x10^6/uL


 


Hgb  14.8    (12.0-16.0)  g/dL


 


Hct  48.4 H    (35-47)  %


 


MCV  93.6    ()  fL


 


MCH  28.6    (26-32)  pg


 


MCHC  30.6 L    (32-36)  g/dL


 


RDW  15.1 H    (11.5-14.0)  %


 


Plt Count  183    (150-450)  x10^3/uL


 


MPV  10.0    (7.5-11.0)  fL


 


Gran %  85.1 H    (36.0-66.0)  %


 


Immature Gran % (Auto)  0.5 H    (0.00-0.4)  %


 


Nucleat RBC Rel Count  0.0    (0.00-0.1)  %


 


Eos # (Auto)  0.03    (0-0.5)  x10^3/uL


 


Immature Gran # (Auto)  0.11 H    (0.00-0.03)  x10^3u/L


 


Absolute Lymphs (auto)  2.26    (1.0-4.6)  x10^3/uL


 


Absolute Monos (auto)  0.74    (0.0-1.3)  x10^3/uL


 


Absolute Nucleated RBC  0.00    (0.00-0.01)  x10^3u/L


 


Lymphocytes %  10.6 L    (24.0-44.0)  %


 


Monocytes %  3.5    (0.0-12.0)  %


 


Eosinophils %  0.1    (0.00-5.0)  %


 


Basophils %  0.2    (0.0-0.4)  %


 


Absolute Granulocytes  18.14 H    (1.4-6.9)  x10^3/uL


 


Basophils #  0.04    (0-0.4)  x10^3/uL


 


D-Dimer    11.03 H*  (0.0-0.50)  mg/L


 


Sodium   141   (137-145)  mmol/L


 


Potassium   3.7   (3.5-5.1)  mmol/L


 


Chloride   105   ()  mmol/L


 


Carbon Dioxide   18 L   (22-30)  mmol/L


 


Anion Gap   21.1 H   (5-15)  MEQ/L


 


BUN   24 H   (7-17)  mg/dL


 


Creatinine   1.22 H   (0.52-1.04)  mg/dL


 


Estimated GFR   45.3   ML/MIN


 


Glucose   275 H   ()  mg/dL


 


Lactic Acid     (0.4-2.0)  


 


Calcium   9.2   (8.4-10.2)  mg/dL


 


Total Bilirubin   1.10   (0.2-1.3)  mg/dL


 


AST   59 H   (14-36)  U/L


 


ALT   32   (0-35)  U/L


 


Alkaline Phosphatase   133 H   ()  U/L


 


Ammonia     (9-30)  umol/L


 


Troponin I     (0.000-0.034)  ng/mL


 


NT-Pro-B Natriuret Pep   911   (0-1800)  pg/mL


 


Serum Total Protein   7.8   (6.3-8.2)  g/dL


 


Albumin   4.5   (3.5-5.0)  g/dL


 


Prealbumin     (17.6-36.0)  mg/dL


 


Urine Color     (Yellow)  


 


Urine Appearance     (Clear)  


 


Urine pH     (4.6-8.0)  


 


Ur Specific Gravity     (1.005-1.030)  


 


Urine Protein     (Negative)  


 


Urine Glucose (UA)     (Negative)  mg/dL


 


Urine Ketones     (Negative)  


 


Urine Blood     (Negative)  


 


Urine Nitrite     (Negative)  


 


Urine Bilirubin     (Negative)  


 


Urine Urobilinogen     (0.2)  mg/dL


 


Ur Leukocyte Esterase     (Negative)  


 


U Hyaline Cast (Auto)     (0-2)  /LPF


 


Urine Microscopic RBC     (0-5)  /HPF


 


Urine Microscopic WBC     (0-5)  /HPF


 


Ur Epithelial Cells     (None Seen)  /HPF


 


Urine Bacteria     (None Seen)  /HPF


 


Urine Culture Reflexed     (NO)  


 


Urine Opiates Level     (NEGATIVE)  


 


Ur Methadone     (NEGATIVE)  


 


Urine Barbiturates     (NEGATIVE)  


 


Ur Phencyclidine (PCP)     (NEGATIVE)  


 


Urine Amphetamine     (NEGATIVE)  


 


U Benzodiazepine Level     (NEGATIVE)  


 


Urine Cocaine     (NEGATIVE)  


 


Urine Marijuana (THC)     (NEGATIVE)  


 


C. difficile Screen     (NEGATIVE)  


 


C.difficile 027-NAP1-B1     (NEGATIVE)  


 


Influenza Type A Ag     (NEGATIVE)  


 


Influenza Type B Ag     (NEGATIVE)  


 


RSV (PCR)     (Negative)  


 


SARS-CoV-2 (PCR)     (NEGATIVE)  














  01/20/23 01/20/23 01/20/23 Range/Units





  16:40 16:40 16:40 


 


WBC     (4.0-10.5)  x10^3/uL


 


RBC     (4.1-5.4)  x10^6/uL


 


Hgb     (12.0-16.0)  g/dL


 


Hct     (35-47)  %


 


MCV     ()  fL


 


MCH     (26-32)  pg


 


MCHC     (32-36)  g/dL


 


RDW     (11.5-14.0)  %


 


Plt Count     (150-450)  x10^3/uL


 


MPV     (7.5-11.0)  fL


 


Gran %     (36.0-66.0)  %


 


Immature Gran % (Auto)     (0.00-0.4)  %


 


Nucleat RBC Rel Count     (0.00-0.1)  %


 


Eos # (Auto)     (0-0.5)  x10^3/uL


 


Immature Gran # (Auto)     (0.00-0.03)  x10^3u/L


 


Absolute Lymphs (auto)     (1.0-4.6)  x10^3/uL


 


Absolute Monos (auto)     (0.0-1.3)  x10^3/uL


 


Absolute Nucleated RBC     (0.00-0.01)  x10^3u/L


 


Lymphocytes %     (24.0-44.0)  %


 


Monocytes %     (0.0-12.0)  %


 


Eosinophils %     (0.00-5.0)  %


 


Basophils %     (0.0-0.4)  %


 


Absolute Granulocytes     (1.4-6.9)  x10^3/uL


 


Basophils #     (0-0.4)  x10^3/uL


 


D-Dimer     (0.0-0.50)  mg/L


 


Sodium     (137-145)  mmol/L


 


Potassium     (3.5-5.1)  mmol/L


 


Chloride     ()  mmol/L


 


Carbon Dioxide     (22-30)  mmol/L


 


Anion Gap     (5-15)  MEQ/L


 


BUN     (7-17)  mg/dL


 


Creatinine     (0.52-1.04)  mg/dL


 


Estimated GFR     ML/MIN


 


Glucose     ()  mg/dL


 


Lactic Acid     (0.4-2.0)  


 


Calcium     (8.4-10.2)  mg/dL


 


Total Bilirubin     (0.2-1.3)  mg/dL


 


AST     (14-36)  U/L


 


ALT     (0-35)  U/L


 


Alkaline Phosphatase     ()  U/L


 


Ammonia   31 H   (9-30)  umol/L


 


Troponin I  < 0.012    (0.000-0.034)  ng/mL


 


NT-Pro-B Natriuret Pep     (0-1800)  pg/mL


 


Serum Total Protein     (6.3-8.2)  g/dL


 


Albumin     (3.5-5.0)  g/dL


 


Prealbumin     (17.6-36.0)  mg/dL


 


Urine Color    Dark Yellow A  (Yellow)  


 


Urine Appearance    Turbid A  (Clear)  


 


Urine pH    6.5  (4.6-8.0)  


 


Ur Specific Gravity    1.025  (1.005-1.030)  


 


Urine Protein    30  (Negative)  


 


Urine Glucose (UA)    >=1000 A  (Negative)  mg/dL


 


Urine Ketones    Negative  (Negative)  


 


Urine Blood    Negative  (Negative)  


 


Urine Nitrite    Negative  (Negative)  


 


Urine Bilirubin    Negative  (Negative)  


 


Urine Urobilinogen    1.0 A  (0.2)  mg/dL


 


Ur Leukocyte Esterase    Small A  (Negative)  


 


U Hyaline Cast (Auto)    NONE SEEN  (0-2)  /LPF


 


Urine Microscopic RBC    6-10 A  (0-5)  /HPF


 


Urine Microscopic WBC     A  (0-5)  /HPF


 


Ur Epithelial Cells    None Seen  (None Seen)  /HPF


 


Urine Bacteria    Many A  (None Seen)  /HPF


 


Urine Culture Reflexed    ORDERED SEPARATELY  (NO)  


 


Urine Opiates Level     (NEGATIVE)  


 


Ur Methadone     (NEGATIVE)  


 


Urine Barbiturates     (NEGATIVE)  


 


Ur Phencyclidine (PCP)     (NEGATIVE)  


 


Urine Amphetamine     (NEGATIVE)  


 


U Benzodiazepine Level     (NEGATIVE)  


 


Urine Cocaine     (NEGATIVE)  


 


Urine Marijuana (THC)     (NEGATIVE)  


 


C. difficile Screen     (NEGATIVE)  


 


C.difficile 027-NAP1-B1     (NEGATIVE)  


 


Influenza Type A Ag     (NEGATIVE)  


 


Influenza Type B Ag     (NEGATIVE)  


 


RSV (PCR)     (Negative)  


 


SARS-CoV-2 (PCR)     (NEGATIVE)  














  01/20/23 01/20/23 01/20/23 Range/Units





  16:40 17:25 19:00 


 


WBC     (4.0-10.5)  x10^3/uL


 


RBC     (4.1-5.4)  x10^6/uL


 


Hgb     (12.0-16.0)  g/dL


 


Hct     (35-47)  %


 


MCV     ()  fL


 


MCH     (26-32)  pg


 


MCHC     (32-36)  g/dL


 


RDW     (11.5-14.0)  %


 


Plt Count     (150-450)  x10^3/uL


 


MPV     (7.5-11.0)  fL


 


Gran %     (36.0-66.0)  %


 


Immature Gran % (Auto)     (0.00-0.4)  %


 


Nucleat RBC Rel Count     (0.00-0.1)  %


 


Eos # (Auto)     (0-0.5)  x10^3/uL


 


Immature Gran # (Auto)     (0.00-0.03)  x10^3u/L


 


Absolute Lymphs (auto)     (1.0-4.6)  x10^3/uL


 


Absolute Monos (auto)     (0.0-1.3)  x10^3/uL


 


Absolute Nucleated RBC     (0.00-0.01)  x10^3u/L


 


Lymphocytes %     (24.0-44.0)  %


 


Monocytes %     (0.0-12.0)  %


 


Eosinophils %     (0.00-5.0)  %


 


Basophils %     (0.0-0.4)  %


 


Absolute Granulocytes     (1.4-6.9)  x10^3/uL


 


Basophils #     (0-0.4)  x10^3/uL


 


D-Dimer     (0.0-0.50)  mg/L


 


Sodium    138  (137-145)  mmol/L


 


Potassium    4.1  (3.5-5.1)  mmol/L


 


Chloride    109 H  ()  mmol/L


 


Carbon Dioxide    13 L*  (22-30)  mmol/L


 


Anion Gap    19.7 H  (5-15)  MEQ/L


 


BUN    26 H  (7-17)  mg/dL


 


Creatinine    1.11 H  (0.52-1.04)  mg/dL


 


Estimated GFR    50.5  ML/MIN


 


Glucose    201 H  ()  mg/dL


 


Lactic Acid     (0.4-2.0)  


 


Calcium    7.9 L  (8.4-10.2)  mg/dL


 


Total Bilirubin     (0.2-1.3)  mg/dL


 


AST     (14-36)  U/L


 


ALT     (0-35)  U/L


 


Alkaline Phosphatase     ()  U/L


 


Ammonia     (9-30)  umol/L


 


Troponin I     (0.000-0.034)  ng/mL


 


NT-Pro-B Natriuret Pep     (0-1800)  pg/mL


 


Serum Total Protein     (6.3-8.2)  g/dL


 


Albumin     (3.5-5.0)  g/dL


 


Prealbumin     (17.6-36.0)  mg/dL


 


Urine Color     (Yellow)  


 


Urine Appearance     (Clear)  


 


Urine pH     (4.6-8.0)  


 


Ur Specific Gravity     (1.005-1.030)  


 


Urine Protein     (Negative)  


 


Urine Glucose (UA)     (Negative)  mg/dL


 


Urine Ketones     (Negative)  


 


Urine Blood     (Negative)  


 


Urine Nitrite     (Negative)  


 


Urine Bilirubin     (Negative)  


 


Urine Urobilinogen     (0.2)  mg/dL


 


Ur Leukocyte Esterase     (Negative)  


 


U Hyaline Cast (Auto)     (0-2)  /LPF


 


Urine Microscopic RBC     (0-5)  /HPF


 


Urine Microscopic WBC     (0-5)  /HPF


 


Ur Epithelial Cells     (None Seen)  /HPF


 


Urine Bacteria     (None Seen)  /HPF


 


Urine Culture Reflexed     (NO)  


 


Urine Opiates Level  NEGATIVE    (NEGATIVE)  


 


Ur Methadone  NEGATIVE    (NEGATIVE)  


 


Urine Barbiturates  NEGATIVE    (NEGATIVE)  


 


Ur Phencyclidine (PCP)  NEGATIVE    (NEGATIVE)  


 


Urine Amphetamine  NEGATIVE    (NEGATIVE)  


 


U Benzodiazepine Level  NEGATIVE    (NEGATIVE)  


 


Urine Cocaine  NEGATIVE    (NEGATIVE)  


 


Urine Marijuana (THC)  NEGATIVE    (NEGATIVE)  


 


C. difficile Screen     (NEGATIVE)  


 


C.difficile 027-NAP1-B1     (NEGATIVE)  


 


Influenza Type A Ag   NEGATIVE   (NEGATIVE)  


 


Influenza Type B Ag   NEGATIVE   (NEGATIVE)  


 


RSV (PCR)   NEGATIVE   (Negative)  


 


SARS-CoV-2 (PCR)   NEGATIVE   (NEGATIVE)  














  01/20/23 01/20/23 01/21/23 Range/Units





  19:14 21:47 02:54 


 


WBC     (4.0-10.5)  x10^3/uL


 


RBC     (4.1-5.4)  x10^6/uL


 


Hgb     (12.0-16.0)  g/dL


 


Hct     (35-47)  %


 


MCV     ()  fL


 


MCH     (26-32)  pg


 


MCHC     (32-36)  g/dL


 


RDW     (11.5-14.0)  %


 


Plt Count     (150-450)  x10^3/uL


 


MPV     (7.5-11.0)  fL


 


Gran %     (36.0-66.0)  %


 


Immature Gran % (Auto)     (0.00-0.4)  %


 


Nucleat RBC Rel Count     (0.00-0.1)  %


 


Eos # (Auto)     (0-0.5)  x10^3/uL


 


Immature Gran # (Auto)     (0.00-0.03)  x10^3u/L


 


Absolute Lymphs (auto)     (1.0-4.6)  x10^3/uL


 


Absolute Monos (auto)     (0.0-1.3)  x10^3/uL


 


Absolute Nucleated RBC     (0.00-0.01)  x10^3u/L


 


Lymphocytes %     (24.0-44.0)  %


 


Monocytes %     (0.0-12.0)  %


 


Eosinophils %     (0.00-5.0)  %


 


Basophils %     (0.0-0.4)  %


 


Absolute Granulocytes     (1.4-6.9)  x10^3/uL


 


Basophils #     (0-0.4)  x10^3/uL


 


D-Dimer     (0.0-0.50)  mg/L


 


Sodium     (137-145)  mmol/L


 


Potassium     (3.5-5.1)  mmol/L


 


Chloride     ()  mmol/L


 


Carbon Dioxide     (22-30)  mmol/L


 


Anion Gap     (5-15)  MEQ/L


 


BUN     (7-17)  mg/dL


 


Creatinine     (0.52-1.04)  mg/dL


 


Estimated GFR     ML/MIN


 


Glucose     ()  mg/dL


 


Lactic Acid  6.4 H    (0.4-2.0)  


 


Calcium     (8.4-10.2)  mg/dL


 


Total Bilirubin     (0.2-1.3)  mg/dL


 


AST     (14-36)  U/L


 


ALT     (0-35)  U/L


 


Alkaline Phosphatase     ()  U/L


 


Ammonia     (9-30)  umol/L


 


Troponin I   < 0.012  < 0.012  (0.000-0.034)  ng/mL


 


NT-Pro-B Natriuret Pep     (0-1800)  pg/mL


 


Serum Total Protein     (6.3-8.2)  g/dL


 


Albumin     (3.5-5.0)  g/dL


 


Prealbumin     (17.6-36.0)  mg/dL


 


Urine Color     (Yellow)  


 


Urine Appearance     (Clear)  


 


Urine pH     (4.6-8.0)  


 


Ur Specific Gravity     (1.005-1.030)  


 


Urine Protein     (Negative)  


 


Urine Glucose (UA)     (Negative)  mg/dL


 


Urine Ketones     (Negative)  


 


Urine Blood     (Negative)  


 


Urine Nitrite     (Negative)  


 


Urine Bilirubin     (Negative)  


 


Urine Urobilinogen     (0.2)  mg/dL


 


Ur Leukocyte Esterase     (Negative)  


 


U Hyaline Cast (Auto)     (0-2)  /LPF


 


Urine Microscopic RBC     (0-5)  /HPF


 


Urine Microscopic WBC     (0-5)  /HPF


 


Ur Epithelial Cells     (None Seen)  /HPF


 


Urine Bacteria     (None Seen)  /HPF


 


Urine Culture Reflexed     (NO)  


 


Urine Opiates Level     (NEGATIVE)  


 


Ur Methadone     (NEGATIVE)  


 


Urine Barbiturates     (NEGATIVE)  


 


Ur Phencyclidine (PCP)     (NEGATIVE)  


 


Urine Amphetamine     (NEGATIVE)  


 


U Benzodiazepine Level     (NEGATIVE)  


 


Urine Cocaine     (NEGATIVE)  


 


Urine Marijuana (THC)     (NEGATIVE)  


 


C. difficile Screen     (NEGATIVE)  


 


C.difficile 027-NAP1-B1     (NEGATIVE)  


 


Influenza Type A Ag     (NEGATIVE)  


 


Influenza Type B Ag     (NEGATIVE)  


 


RSV (PCR)     (Negative)  


 


SARS-CoV-2 (PCR)     (NEGATIVE)  














  01/21/23 01/21/23 01/21/23 Range/Units





  02:54 02:54 03:45 


 


WBC  17.7 H    (4.0-10.5)  x10^3/uL


 


RBC  4.91    (4.1-5.4)  x10^6/uL


 


Hgb  14.0    (12.0-16.0)  g/dL


 


Hct  45.8    (35-47)  %


 


MCV  93.3    ()  fL


 


MCH  28.5    (26-32)  pg


 


MCHC  30.6 L    (32-36)  g/dL


 


RDW  15.5 H    (11.5-14.0)  %


 


Plt Count  163    (150-450)  x10^3/uL


 


MPV  10.6    (7.5-11.0)  fL


 


Gran %  91.5 H    (36.0-66.0)  %


 


Immature Gran % (Auto)  0.4    (0.00-0.4)  %


 


Nucleat RBC Rel Count  0.0    (0.00-0.1)  %


 


Eos # (Auto)  0    (0-0.5)  x10^3/uL


 


Immature Gran # (Auto)  0.07 H    (0.00-0.03)  x10^3u/L


 


Absolute Lymphs (auto)  0.72 L    (1.0-4.6)  x10^3/uL


 


Absolute Monos (auto)  0.65    (0.0-1.3)  x10^3/uL


 


Absolute Nucleated RBC  0.00    (0.00-0.01)  x10^3u/L


 


Lymphocytes %  4.1 L    (24.0-44.0)  %


 


Monocytes %  3.7    (0.0-12.0)  %


 


Eosinophils %  0.0    (0.00-5.0)  %


 


Basophils %  0.3    (0.0-0.4)  %


 


Absolute Granulocytes  16.20 H    (1.4-6.9)  x10^3/uL


 


Basophils #  0.05    (0-0.4)  x10^3/uL


 


D-Dimer     (0.0-0.50)  mg/L


 


Sodium   136 L   (137-145)  mmol/L


 


Potassium   4.5   (3.5-5.1)  mmol/L


 


Chloride   109 H   ()  mmol/L


 


Carbon Dioxide   17 L   (22-30)  mmol/L


 


Anion Gap   15.3 H   (5-15)  MEQ/L


 


BUN   33 H   (7-17)  mg/dL


 


Creatinine   1.29 H   (0.52-1.04)  mg/dL


 


Estimated GFR   42.5   ML/MIN


 


Glucose   187 H   ()  mg/dL


 


Lactic Acid    2.6 H  (0.4-2.0)  


 


Calcium   7.6 L   (8.4-10.2)  mg/dL


 


Total Bilirubin   1.00   (0.2-1.3)  mg/dL


 


AST   40 H   (14-36)  U/L


 


ALT   22   (0-35)  U/L


 


Alkaline Phosphatase   85   ()  U/L


 


Ammonia     (9-30)  umol/L


 


Troponin I     (0.000-0.034)  ng/mL


 


NT-Pro-B Natriuret Pep   620   (0-1800)  pg/mL


 


Serum Total Protein   5.4 L   (6.3-8.2)  g/dL


 


Albumin   3.0 L   (3.5-5.0)  g/dL


 


Prealbumin     (17.6-36.0)  mg/dL


 


Urine Color     (Yellow)  


 


Urine Appearance     (Clear)  


 


Urine pH     (4.6-8.0)  


 


Ur Specific Gravity     (1.005-1.030)  


 


Urine Protein     (Negative)  


 


Urine Glucose (UA)     (Negative)  mg/dL


 


Urine Ketones     (Negative)  


 


Urine Blood     (Negative)  


 


Urine Nitrite     (Negative)  


 


Urine Bilirubin     (Negative)  


 


Urine Urobilinogen     (0.2)  mg/dL


 


Ur Leukocyte Esterase     (Negative)  


 


U Hyaline Cast (Auto)     (0-2)  /LPF


 


Urine Microscopic RBC     (0-5)  /HPF


 


Urine Microscopic WBC     (0-5)  /HPF


 


Ur Epithelial Cells     (None Seen)  /HPF


 


Urine Bacteria     (None Seen)  /HPF


 


Urine Culture Reflexed     (NO)  


 


Urine Opiates Level     (NEGATIVE)  


 


Ur Methadone     (NEGATIVE)  


 


Urine Barbiturates     (NEGATIVE)  


 


Ur Phencyclidine (PCP)     (NEGATIVE)  


 


Urine Amphetamine     (NEGATIVE)  


 


U Benzodiazepine Level     (NEGATIVE)  


 


Urine Cocaine     (NEGATIVE)  


 


Urine Marijuana (THC)     (NEGATIVE)  


 


C. difficile Screen     (NEGATIVE)  


 


C.difficile 027-NAP1-B1     (NEGATIVE)  


 


Influenza Type A Ag     (NEGATIVE)  


 


Influenza Type B Ag     (NEGATIVE)  


 


RSV (PCR)     (Negative)  


 


SARS-CoV-2 (PCR)     (NEGATIVE)  














  01/21/23 01/21/23 Range/Units





  04:00 04:02 


 


WBC    (4.0-10.5)  x10^3/uL


 


RBC    (4.1-5.4)  x10^6/uL


 


Hgb    (12.0-16.0)  g/dL


 


Hct    (35-47)  %


 


MCV    ()  fL


 


MCH    (26-32)  pg


 


MCHC    (32-36)  g/dL


 


RDW    (11.5-14.0)  %


 


Plt Count    (150-450)  x10^3/uL


 


MPV    (7.5-11.0)  fL


 


Gran %    (36.0-66.0)  %


 


Immature Gran % (Auto)    (0.00-0.4)  %


 


Nucleat RBC Rel Count    (0.00-0.1)  %


 


Eos # (Auto)    (0-0.5)  x10^3/uL


 


Immature Gran # (Auto)    (0.00-0.03)  x10^3u/L


 


Absolute Lymphs (auto)    (1.0-4.6)  x10^3/uL


 


Absolute Monos (auto)    (0.0-1.3)  x10^3/uL


 


Absolute Nucleated RBC    (0.00-0.01)  x10^3u/L


 


Lymphocytes %    (24.0-44.0)  %


 


Monocytes %    (0.0-12.0)  %


 


Eosinophils %    (0.00-5.0)  %


 


Basophils %    (0.0-0.4)  %


 


Absolute Granulocytes    (1.4-6.9)  x10^3/uL


 


Basophils #    (0-0.4)  x10^3/uL


 


D-Dimer    (0.0-0.50)  mg/L


 


Sodium    (137-145)  mmol/L


 


Potassium    (3.5-5.1)  mmol/L


 


Chloride    ()  mmol/L


 


Carbon Dioxide    (22-30)  mmol/L


 


Anion Gap    (5-15)  MEQ/L


 


BUN    (7-17)  mg/dL


 


Creatinine    (0.52-1.04)  mg/dL


 


Estimated GFR    ML/MIN


 


Glucose    ()  mg/dL


 


Lactic Acid    (0.4-2.0)  


 


Calcium    (8.4-10.2)  mg/dL


 


Total Bilirubin    (0.2-1.3)  mg/dL


 


AST    (14-36)  U/L


 


ALT    (0-35)  U/L


 


Alkaline Phosphatase    ()  U/L


 


Ammonia    (9-30)  umol/L


 


Troponin I    (0.000-0.034)  ng/mL


 


NT-Pro-B Natriuret Pep    (0-1800)  pg/mL


 


Serum Total Protein    (6.3-8.2)  g/dL


 


Albumin    (3.5-5.0)  g/dL


 


Prealbumin  15.87 L   (17.6-36.0)  mg/dL


 


Urine Color    (Yellow)  


 


Urine Appearance    (Clear)  


 


Urine pH    (4.6-8.0)  


 


Ur Specific Gravity    (1.005-1.030)  


 


Urine Protein    (Negative)  


 


Urine Glucose (UA)    (Negative)  mg/dL


 


Urine Ketones    (Negative)  


 


Urine Blood    (Negative)  


 


Urine Nitrite    (Negative)  


 


Urine Bilirubin    (Negative)  


 


Urine Urobilinogen    (0.2)  mg/dL


 


Ur Leukocyte Esterase    (Negative)  


 


U Hyaline Cast (Auto)    (0-2)  /LPF


 


Urine Microscopic RBC    (0-5)  /HPF


 


Urine Microscopic WBC    (0-5)  /HPF


 


Ur Epithelial Cells    (None Seen)  /HPF


 


Urine Bacteria    (None Seen)  /HPF


 


Urine Culture Reflexed    (NO)  


 


Urine Opiates Level    (NEGATIVE)  


 


Ur Methadone    (NEGATIVE)  


 


Urine Barbiturates    (NEGATIVE)  


 


Ur Phencyclidine (PCP)    (NEGATIVE)  


 


Urine Amphetamine    (NEGATIVE)  


 


U Benzodiazepine Level    (NEGATIVE)  


 


Urine Cocaine    (NEGATIVE)  


 


Urine Marijuana (THC)    (NEGATIVE)  


 


C. difficile Screen   NEGATIVE  (NEGATIVE)  


 


C.difficile [id number]   PRESUMPTIVE NEGATIVE  (NEGATIVE)  


 


Influenza Type A Ag    (NEGATIVE)  


 


Influenza Type B Ag    (NEGATIVE)  


 


RSV (PCR)    (Negative)  


 


SARS-CoV-2 (PCR)    (NEGATIVE)  








                                  Microbiology











 01/20/23 16:40 Urine Culture - Preliminary





 Catherized    GRAM NEGATIVE ID AND SENSITIVITY PENDING














- Radiology Impressions


Radiology Exams & Impressions: 


                              Radiology Procedures











 Category Date Time Status


 


 ABDOMEN AND PELVIS W/0 CONTRAS [CT] Stat Exams  01/21/23 03:40 Completed


 


 CHEST WITH CONTRAST [CT] Stat Exams  01/20/23 19:42 Completed


 


 HEAD WITHOUT CONTRAST [CT] Stat Exams  01/20/23 16:53 Completed








CT/ABDOMEN AND PELVIS W/0 CONTRAS


Indication: Short of breath.  Abdomen pain.  Fever and diarrhea.  Low blood


pressure.





Multiple contiguous axial images obtained through the abdomen and pelvis


without contrast.





Comparison: None





CT chest reported separately.





Patient's body habitus limits exam with abdominal panus not completely


included in the field-of-view.





Noncontrasted stomach and small bowel loops appear unremarkable.  Distended


colon throughout up to 8 cm in diameter with synchronous fluid leveling


favoring ileus.  Descending and sigmoid demonstrates minimal pericolonic


stranding favoring colitis.  Tiny left colic and perihepatic free fluid.  No


walled off fluid collection or free air.





Moderately distended gallbladder with sub-5 mm gallstone near the neck.  No


abnormal biliary distention.  Appendectomy and hysterectomy reported.


Incidental tiny hepatic calcified granuloma.  Both kidneys excrete contrast


from CT PE exam performed earlier in the day.  Empty urinary bladder with


Agrawal balloon catheter in situ.





Remaining liver, pancreas, spleen, adrenal glands, and aorta are unremarkable.





Osseous structures intact with osteopenia, moderate degenerative changes


throughout the spine, and mild levorotoscoliosis.





Impression:


1.  Diffusely distended colon with fluid leveling and descending/sigmoid


colitis.  Tiny left colic free fluid presumed reactive.


2.  Distended gallbladder with tiny gallstones.  Cholecystitis not completely


excluded in the right clinical setting given tiny perihepatic fluid.


Gallbladder sonogram may yield further information.


3.  Chronic bony findings.





- Other Procedures and Tests


                               Respiratory Therapy





01/20/23 22:39


Oxygen Nasal Cannula 2 lpm 





01/20/23 23:51


Respiratory Therapy Assessment DAILY 














Assessment/Plan


(1) Sepsis associated hypotension


Current Visit: Yes   Status: Acute   


Assessment & Plan: 


                                 Chief Complaint





Diagnosis                        passing out episode at home today





                                    Allergies











Allergy/AdvReac Type Severity Reaction Status Date / Time


 


sulfamethoxazole Allergy   Verified 01/20/23 23:00





[From Bactrim]     


 


trimethoprim [From Bactrim] Allergy   Verified 01/20/23 23:00


 


zinc AdvReac Mild Rash Verified 01/20/23 23:00


 


zinc oxide AdvReac Mild Rash Verified 01/20/23 23:00


 


satin tape Allergy   Uncoded 01/20/23 23:00








                           Vital Signs (Last 24 hours)











  Temp Pulse Resp BP Pulse Ox


 


 01/22/23 07:10   99 H   


 


 01/22/23 06:37   99 H  24   94 L


 


 01/22/23 06:30  102.4 F  99 H  20  79/47  93 L


 


 01/22/23 06:00     93/53 


 


 01/22/23 05:55  102.2 F  97 H  20  107/71  93 L


 


 01/22/23 05:37  102.0 F  99 H  29 H  73/46  90 L


 


 01/22/23 04:35  101.7 F  97 H  27 H  94/65  93 L


 


 01/22/23 03:37  101.5 F  97 H  24  72/48  92 L


 


 01/22/23 02:33  101.5 F  91 H  28 H  97/62  91 L


 


 01/22/23 01:37  101.3 F  94 H  27 H  94/60  95


 


 01/22/23 01:00  101.1 F  95 H  32 H  91/55  91 L


 


 01/22/23 00:46  101.1 F  95 H  36 H  82/56  91 L


 


 01/22/23 00:30  101.1 F  95 H  28 H  66/42  94 L


 


 01/22/23 00:00  100.9 F  95 H  24  90/58  94 L


 


 01/21/23 23:30  100.9 F  90  22  90/48  95


 


 01/21/23 23:00  100.9 F  82  24  78/52  95


 


 01/21/23 22:48  100.9 F  91 H  24  70/57  95


 


 01/21/23 22:00  100.9 F  94 H  16  71/43  94 L


 


 01/21/23 21:00  100.9 F  89  20   93 L


 


 01/21/23 20:00  100.9 F  91 H  24  106/70  94 L


 


 01/21/23 18:43  100.8 F  91 H  17  95/72  96


 


 01/21/23 17:52  100.8 F  89  23  101/65  94 L


 


 01/21/23 17:45   90  20   95


 


 01/21/23 16:48     107/88 


 


 01/21/23 16:47  100.6 F  92 H  24  107/88  95


 


 01/21/23 15:54  100.6 F  84  24  85/68  94 L


 


 01/21/23 15:07     83/54 


 


 01/21/23 14:52  100.4 F  86  22  72/56  96


 


 01/21/23 14:00  100.4 F  86  21  85/56  95


 


 01/21/23 13:00  100.6 F  88  18  93/69  94 L


 


 01/21/23 12:00   82   


 


 01/21/23 11:54  100.6 F  82  20  80/55  95


 


 01/21/23 10:55  100.6 F  87  24  94/48  94 L


 


 01/21/23 09:56  100.6 F  80  20  92/57  95


 


 01/21/23 09:00  100.8 F  86  19  90/54  94 L


 


 01/21/23 08:00   84   89/55 


 


 01/21/23 07:45  101.5 F  85  20  113/62  96








                                Home Medications











 Medication  Instructions  Recorded  Confirmed  Last Taken  Type


 


Clonidine HCl 0.1 mg*** [Clonidine 0.1 mg PO BID 01/20/23 01/20/23 01/20/23 

10:00 History





0.1 mg Tablet]     


 


Dapagliflozin Propanediol [Farxiga] 10 mg PO DAILY 01/20/23 01/20/23 01/20/23 

10:00 History


 


Gabapentin 600 mg PO BID 01/20/23 01/20/23 01/20/23 10:00 History


 


Linaclotide [Linzess] 290 mcg PO DAILY 01/20/23 01/20/23 01/20/23 10:00 History


 


Lisinopril 5 mg*** [Zestril 5 5 mg PO QHS 01/20/23 01/20/23 01/19/23 22:00 

History





MG***]     


 


Mirabegron [Myrbetriq] 50 mg PO DAILY 01/20/23 01/20/23 01/20/23 10:00 History


 


Omeprazole 20 mg PO DAILY 01/20/23 01/20/23 01/20/23 10:00 History


 


Ropinirole HCl 3 mg PO QHS 01/20/23 01/20/23 01/19/23 22:00 History


 


lisinopriL [Lisinopril] 20 mg PO QHS 01/20/23 01/20/23 01/19/23 22:00 History








                               Current Medications











Generic Name Dose Route Start Last Admin





  Trade Name Freq  PRN Reason Stop Dose Admin


 


Acetaminophen  975 mg  01/21/23 03:43  01/22/23 04:02





  Acetaminophen 325 Mg Tablet  PO  02/20/23 03:37  975 mg





  Q6H PRN PRN   Administration





  FEVER  


 


Aspirin  325 mg  01/21/23 14:00  01/21/23 13:50





  Aspirin 325 Mg Tablet.Ec  PO  02/20/23 13:59  Not Given





  DAILY ANTHONY  


 


Bisacodyl  5 mg  01/21/23 13:24 





  Bisacodyl 5 Mg Tablet.Ec  PO  02/20/23 13:23 





  BID PRN PRN  





  CONSTIPATION  


 


Bumetanide  2 mg  01/21/23 14:00  01/21/23 13:50





  Bumetanide 1 Mg Tablet  PO  02/20/23 13:59  Not Given





  DAILY ANTHONY  


 


Clonidine  0.1 mg  01/21/23 22:00  01/21/23 20:46





  Clonidine Hcl 0.1 Mg Tablet  PO  02/20/23 21:59  Not Given





  BID ANTHONY  


 


Enoxaparin Sodium  40 mg  01/21/23 10:00  01/21/23 09:43





  Enoxaparin Sodium*** 40 Mg/0.4 Ml Syringe  SQ  02/20/23 09:59  40 mg





  DAILY ANTHONY   Administration


 


Gabapentin  600 mg  01/21/23 14:00  01/21/23 22:12





  Gabapentin 300 Mg Capsule  PO  02/20/23 13:59  600 mg





  BID ANTHONY   Administration


 


Hydroxyzine HCl  25 mg  01/21/23 13:24 





  Hydroxyzine Hcl 25 Mg Tablet  PO  02/20/23 13:23 





  TIDPRN PRN  





  Leg Cramps  


 


Meropenem 1 gm/ Sodium  100 mls @ 200 mls/hr  01/20/23 22:39  01/22/23 05:03





  Chloride  IV  01/23/23 22:38  200 mls/hr





  Q8HT ANTHONY   Administration


 


Norepinephrine/Dextrose  8 mg in 250 mls @ 15 mls/hr  01/20/23 22:39  01/22/23 

07:04





  Norepinephrine 8 Mg/250 Ml-D5w  IV  02/19/23 22:38  28 mcg/min





  .[id number] PRN   52.5 mls/hr





  HYPOTENSION   Titration





  Protocol  





  8 MCG/MIN  


 


Lactated Ringer's  1,000 mls @ 150 mls/hr  01/21/23 12:00  01/22/23 00:39





  Lactated Ringers  IV  02/20/23 11:59  150 mls/hr





  .Q6H40M ANTHONY   Administration


 


Metronidazole  500 mg in 100 mls @ 200 mls/hr  01/21/23 12:00  01/22/23 06:03





  Flagyl 500 Mg Ivpb  IV  02/20/23 11:59  200 mls/hr





  Q6HT ANTHONY   Administration


 


Ibuprofen  600 mg  01/21/23 03:44  01/22/23 00:39





  Ibuprofen 600 Mg Tablet  PO  02/20/23 03:38  600 mg





  Q6H PRN PRN   Administration





  FEVER  


 


Insulin Human Lispro  9 unit  01/21/23 16:30  01/22/23 07:03





  Insulin Lispro 1 Unit  SQ  02/20/23 16:29  Not Given





  AC ANTHONY  


 


Insulin Human Regular  0 unit  01/20/23 22:39 





  Insulin Regular, Human 1 Unit  SQ  02/19/23 22:38 





  UD PRN  





  HYPERGLYCEMIA  


 


Lisinopril  20 mg  01/21/23 22:00  01/21/23 20:48





  Lisinopril 20 Mg Tablet  PO  02/20/23 21:59  Not Given





  QHS ANTHONY  


 


Lisinopril  5 mg  01/21/23 22:00  01/21/23 20:48





  Lisinopril 5 Mg Tablet  PO  02/20/23 21:59  Not Given





  QHS ANTHONY  


 


Loperamide HCl  2 mg  01/21/23 11:55  01/21/23 15:00





  Loperamide Hcl 2 Mg Capsule  PO  02/20/23 11:54  2 mg





  PRN PRN   Administration





  DIARRHEA  


 


Mirabegron  50 mg  01/21/23 14:00  01/21/23 13:50





  Mirabegron 25 Mg Tab.Er.24h  PO  02/20/23 13:59  Not Given





  DAILY ANTHONY  


 


Miscellaneous Information  1 each  01/21/23 14:45 





  Medication Intervention 1 Each Each    02/20/23 14:44 





  .RN TO CHECK ANTHONY  


 


Miscellaneous Information  1 each  01/21/23 14:45 





  Medication Intervention 1 Each Each    02/20/23 14:44 





  .RN TO CHECK ANTHONY  


 


Nitroglycerin  0.4 mg  01/21/23 15:00  01/21/23 14:53





  Nitroglycerin 0.4 Mg Patch  TOP  02/20/23 14:59  Not Given





  DAILY ANTHONY  


 


Non-Formulary Medication  1 each  01/21/23 22:00  01/21/23 20:47





  Remove Patch 1 Each  TOP  02/20/23 21:59  Not Given





  HS ANTHONY  


 


Nystatin  0 gm  01/21/23 14:00  01/21/23 13:56





  Nystatin 15 Gm Powder  TOP  02/20/23 13:59  1 gm





  DAILY ANTHONY   Administration


 


Ondansetron HCl  4 mg  01/20/23 22:39 





  Ondansetron Hcl 4 Mg/2 Ml Vial  IV  02/19/23 22:38 





  Q6H PRN PRN  





  NAUSEA/VOMITING  


 


Pantoprazole Sodium  40 mg  01/21/23 14:00  01/21/23 13:56





  Protonix (Pantoprazole) 40 Mg Tablet  PO  02/20/23 13:59  40 mg





  DAILY ANTHONY   Administration


 


Ropinirole HCl  3 mg  01/21/23 22:00  01/21/23 22:12





  Ropinirole Hcl 2 Mg Tablet  PO  02/20/23 21:59  3 mg





  QHS ANTHONY   Administration


 


Fluticasone/Salmeterol  2 puff  01/21/23 07:00  01/22/23 06:35





  Fluticasone/Salmeterol 115/21 - 120 Puff Common Canister  IH  02/20/23 06:59  

2 puff





  BIDRT ANTHONY   Administration


 


Simvastatin  10 mg  01/21/23 22:00  01/21/23 22:12





  Simvastatin 10 Mg Tablet  PO  02/20/23 21:59  10 mg





  HS ANTHONY   Administration


 


Verapamil HCl  180 mg  01/21/23 22:00  01/21/23 20:46





  Verapamil Hcl Sr 180 Mg Tablet.Sa  PO  02/20/23 21:59  Not Given





  BID ANTHONY  














Discontinued Medications














Generic Name Dose Route Start Last Admin





  Trade Name Freq  PRN Reason Stop Dose Admin


 


Acetaminophen  650 mg  01/20/23 19:38  01/20/23 19:41





  Acetaminophen 325 Mg Tablet  PO  01/20/23 19:39  650 mg





  STAT STA   Administration


 


Acetaminophen  Confirm  01/20/23 19:41 





  Acetaminophen 325 Mg Tablet  Administered  01/20/23 19:42 





  Dose  





  650 mg  





  .ROUTE  





  .STK-MED ONE  


 


Acetaminophen  650 mg  01/20/23 22:39  01/21/23 00:10





  Acetaminophen 325 Mg Tablet  PO  02/19/23 22:38  650 mg





  Q4H PRN PRN   Administration





  PAIN, FEVER, HEADACHE  


 


Acetaminophen  Confirm  01/21/23 00:07 





  Acetaminophen 325 Mg Tablet  Administered  01/21/23 00:08 





  Dose  





  650 mg  





  .ROUTE  





  .STK-MED ONE  


 


Sodium Chloride  1,000 mls @ 100 mls/hr  01/20/23 17:00  01/20/23 17:30





  Sodium Chloride 0.9% 1000 Ml  IV  02/19/23 16:59  100 mls/hr





  .Q10H ANTHONY   Administration


 


Meropenem 1 gm/ Sodium  100 mls @ 200 mls/hr  01/20/23 18:09  01/20/23 18:24





  Chloride  IV  01/20/23 18:38  200 mls/hr





  STAT ONE   Administration


 


Norepinephrine/Dextrose  8 mg in 250 mls @ 15 mls/hr  01/20/23 18:09  01/20/23 

18:24





  Norepinephrine 8 Mg/250 Ml-D5w  IV  02/19/23 18:08  8 mcg/min





  .[id number] PRN   15 mls/hr





  HYPOTENSION   Administration





  Protocol  





  8 MCG/MIN  


 


Sodium Chloride  Confirm  01/20/23 18:15 





  Sodium Chloride 100ml Mini-Bag Plus  Administered  01/20/23 18:16 





  Dose  





  100 mls @ ud  





  IV  





  .STK-MED ONE  


 


Sodium Chloride  1,000 mls @ 999 mls/hr  01/20/23 18:59  01/20/23 20:48





  Sodium Chloride 0.9% 1000 Ml  IV  01/20/23 19:59  Infused





  .Q1H1M STA   Infusion


 


Sodium Chloride  1,000 mls @ 999 mls/hr  01/20/23 20:27  01/20/23 22:25





  Sodium Chloride 0.9% 1000 Ml  IV  01/20/23 21:27  Infused





  .Q1H1M STA   Infusion


 


Norepinephrine/Dextrose  Confirm  01/20/23 18:14 





  Norepinephrine 8 Mg/250 Ml-D5w  Administered  01/20/23 18:15 





  Dose  





  8 mg in 250 mls @ ud  





  IV  





  .STK-MED ONE  


 


Sodium Chloride  1,000 mls @ 125 mls/hr  01/20/23 22:39  01/21/23 05:47





  Sodium Chloride 0.9% 1000 Ml  IV  02/19/23 22:38  125 mls/hr





  .Q8H ANTHONY   Administration


 


Sodium Chloride  Confirm  01/21/23 01:00 





  Sodium Chloride 100ml Mini-Bag Plus  Administered  01/21/23 01:01 





  Dose  





  100 mls @ ud  





  IV  





  .STK-MED ONE  


 


Ibuprofen  600 mg  01/20/23 21:10  01/20/23 21:12





  Ibuprofen 600 Mg Tablet  PO  01/20/23 21:11  600 mg





  STAT ONE   Administration


 


Ibuprofen  Confirm  01/20/23 21:11 





  Ibuprofen 600 Mg Tablet  Administered  01/20/23 21:12 





  Dose  





  600 mg  





  .ROUTE  





  .STK-MED ONE  


 


Ibuprofen  600 mg  01/21/23 01:23 





  Ibuprofen 600 Mg Tablet  PO  02/20/23 01:22 





  TIDP PRN  





  MODERATE PAIN  


 


Meropenem  Confirm  01/20/23 18:14 





  Meropenem 1 Gm Vial  Administered  01/20/23 18:15 





  Dose  





  1 gm  





  IV  





  .STK-MED ONE  


 


Meropenem  Confirm  01/20/23 18:15 





  Meropenem 1 Gm Vial  Administered  01/20/23 18:16 





  Dose  





  1 gm  





  IV  





  .STK-MED ONE  


 


Meropenem  Confirm  01/21/23 01:00 





  Meropenem 1 Gm Vial  Administered  01/21/23 01:01 





  Dose  





  1 gm  





  IV  





  .STK-MED ONE  


 


Non-Formulary Medication  1 each  01/21/23 11:46  01/21/23 12:11





  Pharmacy Dosing Request  [facility]  01/21/23 11:47  1 each





  STAT ONE   Administration








                         Intake & Output (Last 24 hours)











 01/19/23 01/20/23 01/21/23 01/22/23





 11:59 11:59 11:59 11:59


 


Intake Total   5030 4567


 


Output Total   850 1275


 


Balance   4180 3292


 


Weight   139.5 kg 








                      Microbiology Results (Last 24 hours)





01/20/23 18:44   Blood   Blood Culture Gram Stain - Pending


01/20/23 18:44   Blood   Blood Culture - Preliminary


                            NO GROWTH TO DATE


01/20/23 16:40   Catherized   Urine Culture - Preliminary


                            GRAM NEGATIVE ID AND SENSITIVITY PENDING





                       Laboratory Results (Last 24 hours)











  01/22/23 01/22/23 01/22/23





  06:45 06:36 06:36


 


WBC    16.9 H


 


RBC    5.12


 


Hgb    14.6


 


Hct    45.8


 


MCV    89.5


 


MCH    28.5


 


MCHC    31.9 L


 


RDW    15.8 H


 


Plt Count    170


 


MPV    10.6


 


Gran %    83.3 H


 


Immature Gran % (Auto)    1.3 H


 


Nucleat RBC Rel Count    0.0


 


Eos # (Auto)    0.23


 


Immature Gran # (Auto)    0.22 H


 


Absolute Lymphs (auto)    1.10


 


Absolute Monos (auto)    1.19


 


Absolute Nucleated RBC    0.00


 


Lymphocytes %    6.5 L


 


Monocytes %    7.0


 


Eosinophils %    1.4


 


Basophils %    0.5


 


Absolute Granulocytes    14.08 H


 


Basophils #    0.09


 


Sodium   132 L 


 


Potassium   5.0 


 


Chloride   111 H 


 


Carbon Dioxide   10 L* 


 


Anion Gap   16.2 H 


 


BUN   56 H 


 


Creatinine   1.65 H 


 


Estimated GFR   32.0 


 


Glucose   160 H 


 


POC Glucometer  144 H  


 


Calcium   6.7 L 


 


Magnesium   2.9 H 


 


Total Bilirubin   0.80 


 


AST   64 H 


 


ALT   24 


 


Alkaline Phosphatase   86 


 


Serum Total Protein   5.5 L 


 


Albumin   2.8 L 














  01/21/23 01/21/23 01/21/23





  20:28 16:20 10:58


 


WBC   


 


RBC   


 


Hgb   


 


Hct   


 


MCV   


 


MCH   


 


MCHC   


 


RDW   


 


Plt Count   


 


MPV   


 


Gran %   


 


Immature Gran % (Auto)   


 


Nucleat RBC Rel Count   


 


Eos # (Auto)   


 


Immature Gran # (Auto)   


 


Absolute Lymphs (auto)   


 


Absolute Monos (auto)   


 


Absolute Nucleated RBC   


 


Lymphocytes %   


 


Monocytes %   


 


Eosinophils %   


 


Basophils %   


 


Absolute Granulocytes   


 


Basophils #   


 


Sodium   


 


Potassium   


 


Chloride   


 


Carbon Dioxide   


 


Anion Gap   


 


BUN   


 


Creatinine   


 


Estimated GFR   


 


Glucose   


 


POC Glucometer  144 H  160 H  163 H


 


Calcium   


 


Magnesium   


 


Total Bilirubin   


 


AST   


 


ALT   


 


Alkaline Phosphatase   


 


Serum Total Protein   


 


Albumin   








                             Orders (Last 24 hours)











 Category Date Time Status


 


 NPO except Meds Diet  01/21/23 07:49 Active


 


 CBC W DIFF AM.LAB Lab  01/22/23 06:36 Completed


 


 CMP AM.LAB Lab  01/22/23 06:36 Completed


 


 MAG [MAGNESIUM] AM.LAB Lab  01/22/23 06:36 Completed


 


 Manual Differential NC Routine Lab  01/22/23 06:36 Completed


 


 POCT GLUCOSE Stat Lab  01/21/23 10:58 Completed


 


 POCT GLUCOSE Stat Lab  01/21/23 16:20 Completed


 


 POCT GLUCOSE Stat Lab  01/21/23 20:28 Completed


 


 POCT GLUCOSE Stat Lab  01/22/23 06:45 Completed


 


 Aspirin  mg*** [Ecotrin 325 MG***] Med  01/21/23 14:00 Active





 325 mg PO DAILY   


 


 Bisacodyl 5 mg*** [Dulcolax 5 mg***] Med  01/21/23 13:24 Active





 5 mg PO BID PRN PRN   


 


 Bumetanide 1 mg*** [Bumex 1 mg***] Med  01/21/23 14:00 Active





 2 mg PO DAILY   


 


 Clonidine HCl 0.1 mg*** [Clonidine 0.1 mg Tablet] Med  01/21/23 22:00 Active





 0.1 mg PO BID   


 


 Enoxaparin Sodium*** [Enoxaparin Sodium] Med  01/21/23 10:00 Active





 40 mg SQ DAILY   


 


 Fluticasone/Salmeterol 115/21 [Advair Hfa 115/21 Common Med  01/21/23 07:00 

Active





 canister*]   





 2 puff IH BIDRT   


 


 Gabapentin *** [Neurontin ***] Med  01/21/23 14:00 Active





 600 mg PO BID   


 


 Hydroxyzine HCl 25 mg*** [Atarax 25 mg***] Med  01/21/23 13:24 Active





 25 mg PO TIDPRN PRN   


 


 Insulin Lispro [Humalog] Med  01/21/23 16:30 Active





 9 unit SQ AC   


 


 Lisinopril 20 mg*** [Zestril 20 MG***] Med  01/21/23 22:00 Active





 20 mg PO QHS   


 


 Lisinopril 5 mg*** [Zestril 5 MG***] Med  01/21/23 22:00 Active





 5 mg PO QHS   


 


 Loperamide HCl 2 mg*** [Imodium 2 mg***] Med  01/21/23 11:55 Active





 2 mg PO PRN PRN   


 


 Medication Intervention Med  01/21/23 14:45 Active





 1 each MC .RN TO CHECK   


 


 Medication Intervention Med  01/21/23 14:45 Active





 1 each MC .RN TO CHECK   


 


 Metronidazole 500 mg Premix [Flagyl 500 mg Ivpb] Med  01/21/23 12:00 Active





 500 mg in 100 ml IV Q6HT   


 


 Mirabegron [Myrbetriq] Med  01/21/23 14:00 Active





 50 mg PO DAILY   


 


 Nitroglycerin 0.4 mg/Hr*** [Nitro-Dur 0.4 MG/HR***] Med  01/21/23 15:00 Active





 0.4 mg TOP DAILY   


 


 Nystatin Powder 15 gm*** [Nystop Powder 15 gm***] Med  01/21/23 14:00 Active





 See Dose Instructions  TOP DAILY   


 


 PANTOPRAZOLE 40 mg Tablet*** [Protonix 40MG Tablet***] Med  01/21/23 14:00 

Active





 40 mg PO DAILY   


 


 Pharmacy Dosing Request Med  01/21/23 11:46 Discontinued





 1 each [facility] STAT ONE   


 


 Remove Patch [Remove Patch Reminder] Med  01/21/23 22:00 Active





 1 each TOP HS   


 


 Ringers Solution,Lactated [Lactated Ringers] 1,000 ml Med  01/21/23 12:00 

Active





  mls/hr   


 


 Ropinirole 2Mg*** [Requip 2Mg Tab***] Med  01/21/23 22:00 Active





 3 mg PO QHS   


 


 Simvastatin 10 mg** [Zocor 10MG**] Med  01/21/23 22:00 Active





 10 mg PO HS   


 


 Verapamil HCl Sr [Isoptin Sr] Med  01/21/23 22:00 Active





 180 mg PO BID   








                       Patient Care Notes (Last 24 hours)





01/22/23 06:24 Nursing Note by Isabela Gonzalez


8130 for past hour patients respirations have changed although o2 sat has not.  

sat remains between 90-95.  pt having pursed lip breathing.  respiratory therapy

consulted and evaluating patient now





Initialized on 01/22/23 06:24 - END OF NOTE








01/21/23 11:45 (created 01/21/23 13:07) Nursing Note by Magi Butler dr at bedside rounding on pt. updated on pt continuous copious amounts 

of liquid diarrhea. new orders for lr @ 150, will add flagyl and imodium.





Initialized on 01/21/23 13:07 - END OF NOTE














Code(s): A41.9 - SEPSIS, UNSPECIFIED ORGANISM; I95.9 - HYPOTENSION, UNSPECIFIED 

 





(2) Sigmoid diverticulitis


Current Visit: Yes   Status: Acute   Code(s): K57.32 - DVTRCLI OF LG INT W/O 

PERFORATION OR ABSCESS W/O BLEEDING   





(3) Altered mental status


Current Visit: Yes   Status: Acute   


Qualifiers: 


   Altered mental status type: delirium   Qualified Code(s): R41.0 - 

Disorientation, unspecified   


Code(s): R41.82 - ALTERED MENTAL STATUS, UNSPECIFIED   





(4) Diabetes mellitus


Current Visit: No   Status: Acute   


Qualifiers: 


   Diabetes mellitus type: type 2   Diabetes mellitus complication status: with 

kidney complications   Diabetes mellitus complication detail: with chronic 

kidney disease   Chronic kidney disease stage: stage 3 (moderate) 


Code(s): E11.9 - TYPE 2 DIABETES MELLITUS WITHOUT COMPLICATIONS Cheiloplasty (Less Than 50%) Text: A decision was made to reconstruct the defect with a  cheiloplasty.  The defect was undermined extensively.  Additional obicularis oris muscle was excised with a 15 blade scalpel.  The defect was converted into a full thickness wedge, of less than 50% of the vertical height of the lip, to facilite a better cosmetic result.  Small vessels were then tied off with 5-0 monocyrl. The obicularis oris, superficial fascia, adipose and dermis were then reapproximated.  After the deeper layers were approximated the epidermis was reapproximated with particular care given to realign the vermilion border.

## 2024-08-19 ENCOUNTER — HOSPITAL ENCOUNTER (OUTPATIENT)
Dept: HOSPITAL 33 - ED | Age: 79
Setting detail: OBSERVATION
LOS: 1 days | Discharge: HOME | End: 2024-08-20
Attending: INTERNAL MEDICINE | Admitting: INTERNAL MEDICINE
Payer: MEDICARE

## 2024-08-19 DIAGNOSIS — M79.605: ICD-10-CM

## 2024-08-19 DIAGNOSIS — J44.9: ICD-10-CM

## 2024-08-19 DIAGNOSIS — N18.9: ICD-10-CM

## 2024-08-19 DIAGNOSIS — L03.115: ICD-10-CM

## 2024-08-19 DIAGNOSIS — G62.9: ICD-10-CM

## 2024-08-19 DIAGNOSIS — I12.9: ICD-10-CM

## 2024-08-19 DIAGNOSIS — Z79.899: ICD-10-CM

## 2024-08-19 DIAGNOSIS — E11.22: ICD-10-CM

## 2024-08-19 DIAGNOSIS — E78.5: ICD-10-CM

## 2024-08-19 DIAGNOSIS — M79.604: ICD-10-CM

## 2024-08-19 DIAGNOSIS — L03.116: Primary | ICD-10-CM

## 2024-08-19 LAB
ALBUMIN SERPL-MCNC: 4 G/DL (ref 3.5–5)
ALP SERPL-CCNC: 95 U/L (ref 38–126)
ALT SERPL-CCNC: 28 U/L (ref 0–35)
ANION GAP SERPL CALC-SCNC: 12.1 MEQ/L (ref 5–15)
AST SERPL QL: 36 U/L (ref 14–36)
BASOPHILS # BLD AUTO: 0.02 X10^3/UL (ref 0.01–0.08)
BASOPHILS NFR BLD AUTO: 0.2 % (ref 0.1–1.2)
BILIRUB BLD-MCNC: 1 MG/DL (ref 0.2–1.3)
BUN SERPL-MCNC: 25 MG/DL (ref 7–17)
CALCIUM SPEC-MCNC: 9.5 MG/DL (ref 8.4–10.2)
CHLORIDE SERPL-SCNC: 107 MMOL/L (ref 98–107)
CO2 SERPL-SCNC: 23 MMOL/L (ref 22–30)
CREAT SERPL-MCNC: 0.99 MG/DL (ref 0.52–1.04)
EOSINOPHIL # BLD AUTO: 0.1 X10^3/UL (ref 0.04–0.36)
GFR SERPLBLD BASED ON 1.73 SQ M-ARVRAT: 57.6 ML/MIN
GLUCOSE SERPL-MCNC: 120 MG/DL (ref 74–106)
HCT VFR BLD AUTO: 36.7 % (ref 34.1–44.9)
HGB BLD-MCNC: 11.8 G/DL (ref 11.2–15.7)
IMM GRANULOCYTES # BLD: 0.02 X10^3U/L (ref 0–0.03)
IMM GRANULOCYTES NFR BLD: 0.2 % (ref 0–0.43)
LYMPHOCYTES # SPEC AUTO: 2.68 X10^3/UL (ref 1.18–3.74)
MCH RBC QN AUTO: 31.5 PG (ref 25.6–32.2)
MCHC RBC AUTO-ENTMCNC: 32.2 G/DL (ref 32.2–35.5)
MONOCYTES # BLD AUTO: 0.6 X10^3/UL (ref 0.24–0.86)
NRBC # BLD AUTO: 0 X10^3U/L (ref 0–0.01)
NRBC BLD AUTO-RTO: 0 % (ref 0–0.2)
PLATELET # BLD AUTO: 103 X10^3/UL (ref 182–369)
POTASSIUM SERPLBLD-SCNC: 4 MMOL/L (ref 3.5–5.1)
PROT SERPL-MCNC: 7.4 G/DL (ref 6.3–8.2)
RBC # BLD AUTO: 3.75 X10^6/UL (ref 3.93–5.22)
SODIUM SERPL-SCNC: 138 MMOL/L (ref 135–145)
WBC # BLD AUTO: 9.8 X10^3/UL (ref 3.98–10.04)

## 2024-08-19 PROCEDURE — 80053 COMPREHEN METABOLIC PANEL: CPT

## 2024-08-19 PROCEDURE — 94760 N-INVAS EAR/PLS OXIMETRY 1: CPT

## 2024-08-19 PROCEDURE — 99285 EMERGENCY DEPT VISIT HI MDM: CPT

## 2024-08-19 PROCEDURE — 94640 AIRWAY INHALATION TREATMENT: CPT

## 2024-08-19 PROCEDURE — 83605 ASSAY OF LACTIC ACID: CPT

## 2024-08-19 PROCEDURE — 36415 COLL VENOUS BLD VENIPUNCTURE: CPT

## 2024-08-19 PROCEDURE — 96365 THER/PROPH/DIAG IV INF INIT: CPT

## 2024-08-19 PROCEDURE — 87040 BLOOD CULTURE FOR BACTERIA: CPT

## 2024-08-19 PROCEDURE — 83036 HEMOGLOBIN GLYCOSYLATED A1C: CPT

## 2024-08-19 PROCEDURE — 85025 COMPLETE CBC W/AUTO DIFF WBC: CPT

## 2024-08-19 PROCEDURE — 93970 EXTREMITY STUDY: CPT

## 2024-08-19 PROCEDURE — 86140 C-REACTIVE PROTEIN: CPT

## 2024-08-19 PROCEDURE — 36000 PLACE NEEDLE IN VEIN: CPT

## 2024-08-19 PROCEDURE — 84145 PROCALCITONIN (PCT): CPT

## 2024-08-19 PROCEDURE — 82947 ASSAY GLUCOSE BLOOD QUANT: CPT

## 2024-08-19 PROCEDURE — 85652 RBC SED RATE AUTOMATED: CPT

## 2024-08-19 PROCEDURE — G0378 HOSPITAL OBSERVATION PER HR: HCPCS

## 2024-08-19 RX ADMIN — CYCLOBENZAPRINE HYDROCHLORIDE SCH MG: 10 TABLET, FILM COATED ORAL at 21:35

## 2024-08-19 RX ADMIN — NYSTATIN SCH GM: 100000 POWDER TOPICAL at 21:40

## 2024-08-19 RX ADMIN — GABAPENTIN SCH MG: 300 CAPSULE ORAL at 21:35

## 2024-08-19 RX ADMIN — INSULIN HUMAN ONE MLS/HR: 100 INJECTION, SOLUTION PARENTERAL at 13:08

## 2024-08-19 RX ADMIN — INSULIN LISPRO SCH UNIT: 100 INJECTION, SOLUTION INTRAVENOUS; SUBCUTANEOUS at 21:36

## 2024-08-19 RX ADMIN — SIMVASTATIN SCH MG: 20 TABLET, FILM COATED ORAL at 21:36

## 2024-08-19 RX ADMIN — ROPINIROLE HYDROCHLORIDE SCH MG: 2 TABLET, FILM COATED ORAL at 21:35

## 2024-08-19 RX ADMIN — MIRABEGRON SCH MG: 25 TABLET, FILM COATED, EXTENDED RELEASE ORAL at 21:35

## 2024-08-19 RX ADMIN — ACETAMINOPHEN PRN MG: 325 TABLET ORAL at 18:34

## 2024-08-19 RX ADMIN — FLUTICASONE PROPIONATE AND SALMETEROL XINAFOATE SCH PUFF: 115; 21 AEROSOL, METERED RESPIRATORY (INHALATION) at 18:31

## 2024-08-19 NOTE — XRAY
Indication: Bilateral leg swelling and pain.



2 dimension sonogram and color Doppler imaging major venous vessels left and

right leg performed.



Comparison: None



No thrombus seen in the examined deep venous vessels left and right leg

including greater saphenous vein.  Veins demonstrate normal compressibility.

Venous waveforms are normal with and without augmentation.



Posterior right knee demonstrate 1.7 x 0.6 x 1.1 cm Baker's cyst.



Impression: Left and right leg negative for DVT.  Incidental tiny right

Baker's cyst.

## 2024-08-19 NOTE — ERPHSYRPT
- History of Present Illness


Time Seen by Provider: 08/19/24 11:10


Source: patient, family


Exam Limitations: no limitations


Patient Subjective Stated Complaint: swelling and redness to BLE, grater in the 

rt


Triage Nursing Assessment: pt came into the er via wheelchair; pt is axo x4; c/o

BLE swelling; pt states pain to BLE; redness present to rt RLE; strong christopher pedal

pulses; slight edema present to BLE; no respiratory distress present; skin PDW; 

vitals wnl


Physician History: 





This is an obese 79-year-old white female patient who arrives by private vehicle

from home secondary to bilateral lower extremity pain and swelling as well as 

redness.  Patient denies trauma to her bilateral lower extremities.  She is on 

aspirin only but no anticoagulation therapy.  Her symptoms of what appears to be

cellulitis is more significant in the right lower extremity than the left lower 

extremity.  Patient has a history of having sepsis in the past.  Patient denies 

chest pain.  Patient denies shortness of breath.  Patient has a history of 

restless leg syndrome, hypertension, obesity, insulin-dependent diabetes, 

gastroesophageal reflux disease, hyperlipidemia, COPD, sleep apnea and chronic 

renal disease as well as peripheral neuropathy.  Patient does not want any type 

or level of pain medicine at this time.


Timing/Duration: day(s) (2)


Severity: moderate


Associated Symptoms: denies symptoms


Allergies/Adverse Reactions: 








sulfamethoxazole [From Bactrim] Allergy (Verified 01/20/23 23:00)


   


   itching, SOB 


trimethoprim [From Bactrim] Allergy (Verified 01/20/23 23:00)


   


zinc Adverse Reaction (Mild, Verified 01/20/23 23:00)


   Rash


zinc oxide Adverse Reaction (Mild, Verified 01/20/23 23:00)


   Rash


satin tape Allergy (Uncoded 01/20/23 23:00)


   





Home Medications: 








Pravastatin Sodium 20 mg PO QHS 09/05/14 [History]


Insulin Aspart** [NovoLOG Insulin**] 7 units SQ ACHS 05/05/17 [History]


Dapagliflozin Propanediol [Farxiga] 10 mg PO DAILY 01/20/23 [History]


Gabapentin 600 mg PO BID 01/20/23 [History]


Linaclotide [Linzess] 290 mcg PO DAILY 01/20/23 [History]


Mirabegron [Myrbetriq] 50 mg PO DAILY 01/20/23 [History]


Omeprazole 20 mg PO DAILY 01/20/23 [History]


Ropinirole HCl 3 mg PO QHS 01/20/23 [History]


Aspirin [Low Dose Aspirin EC] 81 mg PO DAILY 08/19/24 [History]


Cephalexin Mh 500 mg** [Keflex 500 mg**] 500 mg PO DAILY 08/19/24 [History]


Cyclobenzaprine HCl 5 mg PO HS 08/19/24 [History]


Folic Acid 1 mg PO DAILY 08/19/24 [History]


Insulin Glargine,Hum.rec.anlog [Toujeo Solostar] 20 unit SQ HS 08/19/24 

[History]


Metoprolol Tartrate 25 mg*** [Lopressor 25MG Tab***] 12.5 mg PO DAILY 08/19/24 

[History]


Nystatin Cream 30 gm*** [Nystop 30 gm Cream***] 1 applic TOP BID 08/19/24 

[History]


Semaglutide [Ozempic] 2 mg SQ WEEKLY 08/19/24 [History]





Hx Tetanus, Diphtheria Vaccination/Date Given: No


Hx Influenza Vaccination/Date Given: Yes


Hx Pneumococcal Vaccination/Date Given: Yes


Immunizations Up to Date: No





Travel Risk





- International Travel


Have you traveled outside of the country in past 3 weeks: No





- Emerging Infectious Disease


Are you exhibiting symptoms associated with any current EIDs: No





- Review of Systems


Constitutional: No Symptoms


Eyes: No Symptoms


Ears, Nose, & Throat: No Symptoms


Respiratory: No Symptoms


Cardiac: No Symptoms


Abdominal/Gastrointestinal: No Symptoms


Genitourinary Symptoms: No Symptoms


Musculoskeletal: No Symptoms


Skin: Cellulitis (Bilateral lower extremities.  Worse on the right than the 

left)


Neurological: No Symptoms


Psychological: No Symptoms


Endocrine: No Symptoms


Hematologic/Lymphatic: No Symptoms


Immunological/Allergic: No Symptoms


All Other Systems: Reviewed and Negative





- Past Medical History


Pertinent Past Medical History: Yes


Neurological History: Peripheral Neuropathy


ENT History: No Pertinent History


Cardiac History: Angina, High Cholesterol, Hypertension


Respiratory History: COPD, Sleep Apnea


Endocrine Medical History: Diabetes Type II


Musculoskeletal History: Fractures, Osteoarthritis, Other


GI Medical History: GERD, Other


 History: Renal Disease


Psycho-Social History: No Pertinent History


Female Reproductive Disorders: No Pertinent History


Other Medical History: hx bilateral leg wounds in past, scoliosis of lumbar 

spine





- Past Surgical History


Past Surgical History: Yes


Neuro Surgical History: No Pertinent History


Cardiac: No Pertinent History


Respiratory: No Pertinent History


Gastrointestinal: Appendectomy


Genitourinary: No Pertinent History


Musculoskeletal: Other


Female Surgical History: Hysterectomy, Other


Other Surgical History: D&C.  right heel spur removed.  gastric sleeve 2018 with

 140 pound weight loss





- Social History


Smoking Status: Never smoker


Exposure to second hand smoke: Yes


Drug Use: none


Patient Lives Alone: No





- Social Determinants of Health


Will the patient participate in the screening: Yes


Do you worry about a steady place to live?: No


Do you have any problems with any of the following?: No known problems


In the past 12 months,have you had to go without utilities?: No


Transportation Issues: No


Has anyone in your support network made you feel unsafe?: No


Have you or anyone in your house had to go without enough: No





- Nursing Vital Signs


Nursing Vital Signs: 


                               Initial Vital Signs











Temperature  97.6 F   08/19/24 10:57


 


Pulse Rate  69   08/19/24 10:57


 


Respiratory Rate  18   08/19/24 10:57


 


Blood Pressure  141/65   08/19/24 10:57


 


O2 Sat by Pulse Oximetry  97   08/19/24 10:57








                                   Pain Scale











Pain Intensity                 6

















- Physical Exam


General Appearance: no apparent distress, alert, anxiety, obese


Eye Exam: PERRL/EOMI, eyes nml inspection


Ears, Nose, Throat Exam: normal ENT inspection, moist mucous membranes


Neck Exam: normal inspection, non-tender, supple, full range of motion


Respiratory Exam: normal breath sounds, lungs clear, airway intact, No chest 

tenderness, No respiratory distress


Cardiovascular Exam: regular rate/rhythm, normal heart sounds, normal peripheral

 pulses


Gastrointestinal/Abdomen Exam: soft, normal bowel sounds, No tenderness


Pelvic Exam: not done


Rectal Exam: not done


Back Exam: normal inspection, normal range of motion


Extremity Exam: normal range of motion, pelvis stable, swelling (Bilateral lower

 extremities right worse than left.  Redness bilateral lower extremities right 

worse than left), tenderness (Bilateral lower extremities right worse than 

left), other (Patient also has evidence of bilateral chronic venous stasis 

disease.)


Neurologic Exam: alert, oriented x 3, cooperative, CNs II-XII nml as tested, 

sensation nml


Skin Exam: other (Cellulitis and tenderness bilateral lower extremities as 

described above)


Lymphatic Exam: No adenopathy


**SpO2 Interpretation**: normal


SpO2: 97


O2 Delivery: Room Air





- Course


Nursing assessment & vital signs reviewed: Yes


Ordered Tests: 


                               Active Orders 24 hr











 Category Date Time Status


 


 IV Insertion STAT Care  08/19/24 11:36 Active


 


 VENOUS BILATERAL EXTREMITY [US] Stat Exams  08/19/24 11:37 Completed


 


 BLOOD CULTURE Stat Lab  08/19/24 12:35 Received


 


 CBC W DIFF Stat Lab  08/19/24 11:05 Completed


 


 CMP Stat Lab  08/19/24 11:05 Completed


 


 Lactic Acid Stat Lab  08/19/24 11:36 Completed








Medication Summary














Discontinued Medications














Generic Name Dose Route Start Last Admin





  Trade Name Donato  PRN Reason Stop Dose Admin


 


Ampicillin Sodium/Sulbactam Sodium  Confirm  08/19/24 13:04 





  Ampicillin /Sulbactam 3 G/Vial  Administered  08/19/24 13:05 





  Dose  





  3 g  





  .ROUTE  





  .STK-MED ONE  


 


Ampicillin Sodium/Sulbactam  100 mls @ 300 mls/hr  08/19/24 12:52  08/19/24 

13:08





  Sodium 3 g/ Sodium Chloride  IV  08/19/24 13:11  300 mls/hr





  STAT ONE   Administration


 


Sodium Chloride  Confirm  08/19/24 13:04 





  Sodium Chloride 0.9%  Administered  08/19/24 13:05 





  Dose  





  100 mls @ ud  





  .ROUTE  





  .STK-MED ONE  











Lab/Rad Data: 


                           Laboratory Result Diagrams





                                 08/19/24 11:05 





                                 08/19/24 11:05 





                               Laboratory Results











  08/19/24 08/19/24 08/19/24 Range/Units





  11:36 11:05 11:05 


 


WBC    9.8  (3.98-10.04)  x10^3/uL


 


RBC    3.75 L  (3.93-5.22)  x10^6/uL


 


Hgb    11.8  (11.2-15.7)  g/dL


 


Hct    36.7  (34.1-44.9)  %


 


MCV    97.9 H  (79.4-94.8)  fL


 


MCH    31.5  (25.6-32.2)  pg


 


MCHC    32.2  (32.2-35.5)  g/dL


 


RDW    14.5 H  (11.7-14.4)  %


 


Plt Count    103 L  (182-369)  x10^3/uL


 


MPV    10.0  (9.4-12.3)  fL


 


Gran %    65.1  (34.0-71.1)  %


 


Immature Gran % (Auto)    0.2  (0.001-0.429)  %


 


Nucleat RBC Rel Count    0.0  (0.00-0.2)  %


 


Eos # (Auto)    0.10  (0.04-0.36)  x10^3/uL


 


Immature Gran # (Auto)    0.02  (0.001-0.031)  x10^3u/L


 


Absolute Lymphs (auto)    2.68  (1.18-3.74)  x10^3/uL


 


Absolute Monos (auto)    0.60  (0.24-0.86)  x10^3/uL


 


Absolute Nucleated RBC    0.00  (0.00-0.012)  x10^3u/L


 


Lymphocytes %    27.4  (19.3-51.7)  %


 


Monocytes %    6.1  (4.7-12.5)  %


 


Eosinophils %    1.0  (0.7-5.8)  %


 


Basophils %    0.2  (0.1-1.2)  %


 


Absolute Granulocytes    6.35 H  (1.56-6.13)  x10^3/uL


 


Basophils #    0.02  (0.01-0.08)  x10^3/uL


 


Sodium   138   (135-145)  mmol/L


 


Potassium   4.0   (3.5-5.1)  mmol/L


 


Chloride   107   ()  mmol/L


 


Carbon Dioxide   23   (22-30)  mmol/L


 


Anion Gap   12.1   (5-15)  MEQ/L


 


BUN   25 H   (7-17)  mg/dL


 


Creatinine   0.99   (0.52-1.04)  mg/dL


 


Estimated GFR   57.6   ML/MIN


 


Glucose   120 H   ()  mg/dL


 


Lactic Acid  1.2    (0.4-2.0)  


 


Calcium   9.5   (8.4-10.2)  mg/dL


 


Total Bilirubin   1.00   (0.2-1.3)  mg/dL


 


AST   36   (14-36)  U/L


 


ALT   28   (0-35)  U/L


 


Alkaline Phosphatase   95   ()  U/L


 


Serum Total Protein   7.4   (6.3-8.2)  g/dL


 


Albumin   4.0   (3.5-5.0)  g/dL














- Progress


Progress: improved, pain not gone completely, re-examined


Progress Note: 





08/19/24 12:18


My medical decision making and the assignment of moderate to high complexity of 

this patient's medical issue today is based on review of the patient's past 

medical history, review the patient's medication list, reviewed patient drug 

allergy list, history present illness and physical findings on examination.  The

 workup in this patient includes placement of intravenous line, blood cultures, 

CBC, CMP, lactic acid level and venous Dopplers of bilateral lower extremities.





Differential diagnosis includes but is not limited to chronic venous stasis 

disease, cellulitis, DVT


08/19/24 12:25


The ultrasound technologist reported to me that bilateral lower extremity venous

 Dopplers are negative for DVT.  There is evidence of a tiny Baker's cyst right 

lower extremity.


08/19/24 13:18


Interpreted the patient's laboratory data results.  Based on the laboratory data

 results, the patient does not have an acute, emergent medical issue.





The patient does have significant cellulitis that has extended proximally in the

 last 1 to 2 days.  There is associated tenderness and warmth to this area.  

Patient has a history of sepsis in the past and I think would benefit from being

 placed in observation.





I spoke with Dr. Orozco, our telehospitalist, and reviewed the patient history, 

presenting complaint, physical findings, and workup results of this patient's 

medical issue today.  We both agree that the patient should be placed in 

observation in the hospital setting.


Discussed with Dr.: Other (Dr. Orozco)


Counseled pt/family regarding: lab results, diagnosis, rad results





Medical Desision Making





- Independent Historian


Additional History obtained from: Family





- Diagnostic Testing


Diagnostic test were ordered, analyzed, and reviewed by me: Yes


Radiological Interpretation: Reviewed by me, Teleradiologist Report





- Risk of complications


The pt has a high risk of morbidity or mortality based on: Decision regarding 

hospitilization or escalation of hosp level of care





- Departure


Departure Disposition: Observation


Clinical Impression: 


 Bilateral lower leg cellulitis





Condition: Stable


Critical Care Time: No


Referrals: 


[facility] [Primary Care Provider] - Follow up/PCP as directed

## 2024-08-19 NOTE — PCM.HP
History of Present Illness





- Chief Complaint


Chief Complaint: Bilateral lower extremity cellulitis


Date: 08/19/24


History of Present Illness: 


 is a 79 year old female with a pmhx of RLS, HTN, DM, GERD, HLD, COPD 

(RA at baseline), EVGENY, recurrent UTI ( on Keflex prophylactically- Urology Jennifer Dominguez), and peripheral neuropathy who presented to ED 8/19/24 with a three day 

history of BLE edema, pain,  and redness. Patient reports past issues with BLE 

edema for which she takes Bumex. She denies any fever, open wounds, or drainage.

Lab findings unremarkable. Venous doppler negative for DVT. Received Unasyn in 

the ED. Plan for continued IV abx - Ceftriaxone and triamcinolone.  





- Review of Systems


Constitutional: No Symptoms


Eyes: No Symptoms


Ears, Nose, & Throat: No Symptoms


Respiratory: No Symptoms


Cardiac: Edema (BLE R>L 1+/2+ pitting)


Abdominal/Gastrointestinal: No Symptoms


Genitourinary Symptoms: No Symptoms


Musculoskeletal: No Symptoms


Skin: Cellulitis (BLE)


Neurological: No Symptoms


Psychological: No Symptoms


Endocrine: No Symptoms


Hematologic/Lymphatic: No Symptoms


Immunological/Allergic: No Symptoms





Medications & Allergies


Home Medications: 


                              Home Medication List





Pravastatin Sodium 20 mg PO QHS 09/05/14 [History Confirmed 08/19/24]


Insulin Aspart** [NovoLOG Insulin**] 7 units SQ ACHS 05/05/17 [History Confirmed

 08/19/24]


Dapagliflozin Propanediol [Farxiga] 10 mg PO HS 01/20/23 [History Confirmed 

08/19/24]


Gabapentin 600 mg PO BID 01/20/23 [History Confirmed 08/19/24]


Mirabegron [Myrbetriq] 50 mg PO HS 01/20/23 [History Confirmed 08/19/24]


Omeprazole 20 mg PO DAILY 01/20/23 [History Confirmed 08/19/24]


Ropinirole HCl 3 mg PO QHS 01/20/23 [History Confirmed 08/19/24]


Aspirin [Low Dose Aspirin EC] 81 mg PO DAILY 08/19/24 [History Confirmed 

08/19/24]


Cephalexin Mh 500 mg** [Keflex 500 mg**] 500 mg PO HS 08/19/24 [History Confirme

d 08/19/24]


Cyclobenzaprine HCl 5 mg PO HS 08/19/24 [History Confirmed 08/19/24]


Folic Acid 1 mg PO DAILY 08/19/24 [History Confirmed 08/19/24]


Insulin Glargine,Hum.rec.anlog [Toujeo Solostar] 20 unit SQ HS 08/19/24 [History

 Confirmed 08/19/24]


Linaclotide [Linzess] 245 mcg PO DAILY 08/19/24 [History Confirmed 08/19/24]


Metoprolol Tartrate 25 mg*** [Lopressor 25MG Tab***] 12.5 mg PO DAILY 08/19/24 

[History Confirmed 08/19/24]


Nystatin Cream 30 gm*** [Nystop 30 gm Cream***] 1 applic TOP BID 08/19/24 

[History Confirmed 08/19/24]


Semaglutide [Ozempic] 2 mg SQ WEEKLY 08/19/24 [History Confirmed 08/19/24]








Allergies/Adverse Reactions: 


                                    Allergies











Allergy/AdvReac Type Severity Reaction Status Date / Time


 


sulfamethoxazole Allergy   Verified 01/20/23 23:00





[From Bactrim]     


 


trimethoprim [From Bactrim] Allergy   Verified 01/20/23 23:00


 


zinc AdvReac Mild Rash Verified 01/20/23 23:00


 


zinc oxide AdvReac Mild Rash Verified 01/20/23 23:00


 


satin tape Allergy   Uncoded 01/20/23 23:00














- Past Medical History


Past Medical History: Yes


Neurological History: Peripheral Neuropathy


ENT History: No Pertinent History


Cardiac History: Angina, High Cholesterol, Hypertension


Respiratory History: COPD, Sleep Apnea


Endocrine Medical History: Diabetes Type II


Musculoskelatal History: Fractures, Osteoarthritis, Other


GI Medical History: GERD, Other


 History: Renal Disease


Pyscho-Social History: No Pertinent History


Reproductive Disorders: No Pertinent History


Comment: hx bilateral leg wounds in past, scoliosis of lumbar spine





- Past Surgical History


Past Surgical History: Yes


Neuro Surgical History: No Pertinent History


Cardiac History: No Pertinent History


Respiratory Surgery: No Pertinent History


GI Surgical History: Appendectomy


Genitourinary Surgical Hx: No Pertinent History


Musculskeletal Surgical Hx: Other


Female Surgical History: Hysterectomy, Other


Other Surgical History: D&C.  right heel spur removed.  gastric sleeve 2018 with

140 pound weight loss





- Social History


Smoking Status: Never smoker


Exposure to second hand smoke: Yes


Alcohol: None


Drug Use: none





- Social Determinants of Health


Will the patient participate in the screening: Yes


Do you worry about a steady place to live?: No


Do you have any problems with any of the following?: No known problems


In the past 12 months,have you had to go without utilities?: No


Have you or anyone in your house had to go without enough: No


Transportation Issues: No


Has anyone in your support network made you feel unsafe?: No





- Physical Exam


Vital Signs: 


                               Vital Signs - 24 hr











  Temp Pulse Resp BP BP Pulse Ox


 


 08/19/24 13:22       97


 


 08/19/24 13:01   64   128/57   96


 


 08/19/24 12:31   62   118/87   95


 


 08/19/24 12:00   64   128/63   93 L


 


 08/19/24 11:30   61   114/59   96


 


 08/19/24 11:01   66   122/67   96


 


 08/19/24 10:57  97.6 F  69  18   141/65  97











General Appearance: no apparent distress


Neurologic Exam: alert, oriented x 3, cooperative


Eye Exam: PERRL/EOMI


Ears, Nose, Throat Exam: normal ENT inspection


Neck Exam: normal inspection


Respiratory Exam: normal breath sounds, lungs clear


Cardiovascular Exam: regular rate/rhythm, normal heart sounds


Pelvic Exam: not done


Rectal Exam: deferred


Back Exam: normal inspection


Extremity Exam: pedal edema, swelling (BLE R>L 1+/2+ pitting with erythema to 

mid shin bilaterally)





Results





- Labs


Lab/Micro Results: 


                            Lab Results-Last 24 Hours











  08/19/24 08/19/24 08/19/24 Range/Units





  11:05 11:05 11:36 


 


WBC  9.8    (3.98-10.04)  x10^3/uL


 


RBC  3.75 L    (3.93-5.22)  x10^6/uL


 


Hgb  11.8    (11.2-15.7)  g/dL


 


Hct  36.7    (34.1-44.9)  %


 


MCV  97.9 H    (79.4-94.8)  fL


 


MCH  31.5    (25.6-32.2)  pg


 


MCHC  32.2    (32.2-35.5)  g/dL


 


RDW  14.5 H    (11.7-14.4)  %


 


Plt Count  103 L    (182-369)  x10^3/uL


 


MPV  10.0    (9.4-12.3)  fL


 


Gran %  65.1    (34.0-71.1)  %


 


Immature Gran % (Auto)  0.2    (0.001-0.429)  %


 


Nucleat RBC Rel Count  0.0    (0.00-0.2)  %


 


Eos # (Auto)  0.10    (0.04-0.36)  x10^3/uL


 


Immature Gran # (Auto)  0.02    (0.001-0.031)  x10^3u/L


 


Absolute Lymphs (auto)  2.68    (1.18-3.74)  x10^3/uL


 


Absolute Monos (auto)  0.60    (0.24-0.86)  x10^3/uL


 


Absolute Nucleated RBC  0.00    (0.00-0.012)  x10^3u/L


 


Lymphocytes %  27.4    (19.3-51.7)  %


 


Monocytes %  6.1    (4.7-12.5)  %


 


Eosinophils %  1.0    (0.7-5.8)  %


 


Basophils %  0.2    (0.1-1.2)  %


 


Absolute Granulocytes  6.35 H    (1.56-6.13)  x10^3/uL


 


Basophils #  0.02    (0.01-0.08)  x10^3/uL


 


Sodium   138   (135-145)  mmol/L


 


Potassium   4.0   (3.5-5.1)  mmol/L


 


Chloride   107   ()  mmol/L


 


Carbon Dioxide   23   (22-30)  mmol/L


 


Anion Gap   12.1   (5-15)  MEQ/L


 


BUN   25 H   (7-17)  mg/dL


 


Creatinine   0.99   (0.52-1.04)  mg/dL


 


Estimated GFR   57.6   ML/MIN


 


Glucose   120 H   ()  mg/dL


 


Lactic Acid    1.2  (0.4-2.0)  


 


Calcium   9.5   (8.4-10.2)  mg/dL


 


Total Bilirubin   1.00   (0.2-1.3)  mg/dL


 


AST   36   (14-36)  U/L


 


ALT   28   (0-35)  U/L


 


Alkaline Phosphatase   95   ()  U/L


 


Serum Total Protein   7.4   (6.3-8.2)  g/dL


 


Albumin   4.0   (3.5-5.0)  g/dL














- Radiology Impressions


Radiology Exams & Impressions: 


                              Radiology Procedures











 Category Date Time Status


 


 VENOUS BILATERAL EXTREMITY [US] Stat Exams  08/19/24 11:37 Completed














Assessment/Plan


(1) Bilateral lower leg cellulitis


Current Visit: Yes   Status: Acute   


Assessment & Plan: 


-Venous stasis dermatitis vs cellulitis


-neli skaggs


-PCT, ESR, CRP


-elevate affected extremities 


-Wound culture any open areas - none currently on exam


-venous doppler negative for DVT


-Unasyn in ED, will continue with ceftriaxone/triamcinolone cream


Code(s): L03.116 - CELLULITIS OF LEFT LOWER LIMB; L03.115 - CELLULITIS OF RIGHT 

LOWER LIMB   





(2) COPD (chronic obstructive pulmonary disease)


Current Visit: Yes   Status: Acute   


Assessment & Plan: 


-RA at baseline


-Does not appear to be in exacerbation


-continue home meds


-NEBs/oxygen as needed








(3) HTN (hypertension)


Current Visit: Yes   Status: Acute   


Assessment & Plan: 


-stable, continue home meds


Code(s): I10 - ESSENTIAL (PRIMARY) HYPERTENSION   





(4) HLD (hyperlipidemia)


Current Visit: Yes   Status: Acute   


Assessment & Plan: 


-continue statin


Code(s): E78.5 - HYPERLIPIDEMIA, UNSPECIFIED   





(5) Peripheral neuropathy


Current Visit: Yes   Status: Acute   


Assessment & Plan: 


-continue gabapentin


Code(s): G62.9 - POLYNEUROPATHY, UNSPECIFIED   





(6) Diabetes mellitus


Current Visit: No   Status: Acute   


Qualifiers: 


   Diabetes mellitus type: type 2   Diabetes mellitus complication status: with 

kidney complications   Diabetes mellitus complication detail: with chronic k

idney disease   Chronic kidney disease stage: stage 3 (moderate) 


Assessment & Plan: 


-ADA diet


-A1c


-SSI





VTE: lovenox


PPI: omeprazole


Dispo: 1-2 days


Code status: Full


Code(s): E11.9 - TYPE 2 DIABETES MELLITUS WITHOUT COMPLICATIONS   





Telemedicine Encounter





- Telemedicine Encounter


Telemedicine Encounter: 


****"The entirety of this encounter was performed via Telemedicine"****





This visit was performed using real-time audio and video connection between my 

location and thepatients locationwith the assistance of a surrogateat the 

patients location. Written or verbal consent was obtained from the 

patient/guardian to perform this visit usingnchrEkotelemedicine 

technology. Any patient questions regarding the telemedicine interaction were 

answered.

## 2024-08-20 VITALS
TEMPERATURE: 97.8 F | DIASTOLIC BLOOD PRESSURE: 65 MMHG | OXYGEN SATURATION: 94 % | HEART RATE: 63 BPM | SYSTOLIC BLOOD PRESSURE: 150 MMHG

## 2024-08-20 VITALS — RESPIRATION RATE: 18 BRPM

## 2024-08-20 LAB
ALBUMIN SERPL-MCNC: 3.4 G/DL (ref 3.5–5)
ALP SERPL-CCNC: 95 U/L (ref 38–126)
ALT SERPL-CCNC: 22 U/L (ref 0–35)
ANION GAP SERPL CALC-SCNC: 12 MEQ/L (ref 5–15)
AST SERPL QL: 27 U/L (ref 14–36)
BASOPHILS # BLD AUTO: 0.02 X10^3/UL (ref 0.01–0.08)
BASOPHILS NFR BLD AUTO: 0.3 % (ref 0.1–1.2)
BILIRUB BLD-MCNC: 0.6 MG/DL (ref 0.2–1.3)
BUN SERPL-MCNC: 24 MG/DL (ref 7–17)
CALCIUM SPEC-MCNC: 8.9 MG/DL (ref 8.4–10.2)
CHLORIDE SERPL-SCNC: 109 MMOL/L (ref 98–107)
CO2 SERPL-SCNC: 21 MMOL/L (ref 22–30)
CREAT SERPL-MCNC: 0.87 MG/DL (ref 0.52–1.04)
EOSINOPHIL # BLD AUTO: 0.13 X10^3/UL (ref 0.04–0.36)
GFR SERPLBLD BASED ON 1.73 SQ M-ARVRAT: 67.3 ML/MIN
GLUCOSE SERPL-MCNC: 105 MG/DL (ref 74–106)
HCT VFR BLD AUTO: 33.8 % (ref 34.1–44.9)
HGB BLD-MCNC: 10.7 G/DL (ref 11.2–15.7)
IMM GRANULOCYTES # BLD: 0.02 X10^3U/L (ref 0–0.03)
IMM GRANULOCYTES NFR BLD: 0.3 % (ref 0–0.43)
LYMPHOCYTES # SPEC AUTO: 3.08 X10^3/UL (ref 1.18–3.74)
MCH RBC QN AUTO: 31 PG (ref 25.6–32.2)
MCHC RBC AUTO-ENTMCNC: 31.7 G/DL (ref 32.2–35.5)
MONOCYTES # BLD AUTO: 0.54 X10^3/UL (ref 0.24–0.86)
NRBC # BLD AUTO: 0 X10^3U/L (ref 0–0.01)
NRBC BLD AUTO-RTO: 0 % (ref 0–0.2)
PLATELET # BLD AUTO: 96 X10^3/UL (ref 182–369)
POTASSIUM SERPLBLD-SCNC: 4 MMOL/L (ref 3.5–5.1)
PROT SERPL-MCNC: 6.5 G/DL (ref 6.3–8.2)
RBC # BLD AUTO: 3.45 X10^6/UL (ref 3.93–5.22)
SODIUM SERPL-SCNC: 138 MMOL/L (ref 135–145)
WBC # BLD AUTO: 7.3 X10^3/UL (ref 3.98–10.04)

## 2024-08-20 RX ADMIN — FUROSEMIDE SCH MG: 10 INJECTION, SOLUTION INTRAMUSCULAR; INTRAVENOUS at 10:06

## 2024-08-20 RX ADMIN — TRIAMCINOLONE ACETONIDE SCH GM: 1 CREAM TOPICAL at 10:05

## 2024-08-20 RX ADMIN — ENOXAPARIN SODIUM SCH MG: 100 INJECTION SUBCUTANEOUS at 10:04

## 2024-08-20 RX ADMIN — METOPROLOL TARTRATE SCH MG: 25 TABLET, FILM COATED ORAL at 10:06

## 2024-08-20 RX ADMIN — ASPIRIN SCH MG: 81 TABLET, COATED ORAL at 10:06

## 2024-08-20 RX ADMIN — CEFTRIAXONE SODIUM SCH MLS/HR: 1 INJECTION, POWDER, FOR SOLUTION INTRAMUSCULAR; INTRAVENOUS at 10:04

## 2024-08-20 RX ADMIN — FOLIC ACID SCH MG: 1 TABLET ORAL at 10:06

## 2024-08-20 RX ADMIN — PANTOPRAZOLE SODIUM SCH MG: 40 TABLET, DELAYED RELEASE ORAL at 10:05

## 2024-08-20 NOTE — PCM.NOTE
Date and Time: 08/20/24  0501





Subjective Assessment: 


 is a 79 year old female with a pmhx of RLS, HTN, DM, GERD, HLD, COPD 

(RA at baseline), EVGENY, recurrent UTI ( on Keflex prophylactically- [facility] 

[facility]ica), and peripheral neuropathy who presented to ED 8/19/24 with a three day 

history of BLE edema, pain,  and redness. Patient reports past issues with BLE 

edema for which she takes Bumex. She denies any fever, open wounds, or drainage.

Lab findings unremarkable. Venous doppler negative for DVT. Received Unasyn in 

the ED. Plan for continued IV abx - Ceftriaxone and triamcinolone.  








Objective Data


Vital Signs: 


                               Vital Signs - 24 hr











  Temp Pulse Resp BP BP Pulse Ox


 


 08/20/24 04:00  98.4 F  78  16   145/81  95


 


 08/20/24 00:00  98.1 F  88  16   125/60  98


 


 08/19/24 22:11       95


 


 08/19/24 20:00  98.5 F  76  15   131/63  96


 


 08/19/24 18:31   67  16    94 L


 


 08/19/24 16:40   68  16    94 L


 


 08/19/24 16:02  97.5 F  67  16   181/80  92 L


 


 08/19/24 16:00  97.5 F  67  16   181/80  92 L


 


 08/19/24 13:22       97


 


 08/19/24 13:01   64   128/57   96


 


 08/19/24 12:31   62   118/87   95


 


 08/19/24 12:00   64   128/63   93 L


 


 08/19/24 11:30   61   114/59   96


 


 08/19/24 11:01   66   122/67   96


 


 08/19/24 10:57  97.6 F  69  18   141/65  97








                        Pain Assessment - Last Documented











Pain Intensity                 0


 


Pain Scale Used                0-10 Pain Scale











Intake and Output: 


                                 Intake & Output











 08/17/24 08/18/24 08/19/24 08/20/24





 11:59 11:59 11:59 11:59


 


Intake Total    120


 


Balance    120


 


Weight   128.7 kg 128.6 kg











Lab Results: 


                            Lab Results-Last 24 Hours











  08/19/24 08/19/24 08/19/24 Range/Units





  11:05 11:05 11:30 


 


WBC  9.8    (3.98-10.04)  x10^3/uL


 


RBC  3.75 L    (3.93-5.22)  x10^6/uL


 


Hgb  11.8    (11.2-15.7)  g/dL


 


Hct  36.7    (34.1-44.9)  %


 


MCV  97.9 H    (79.4-94.8)  fL


 


MCH  31.5    (25.6-32.2)  pg


 


MCHC  32.2    (32.2-35.5)  g/dL


 


RDW  14.5 H    (11.7-14.4)  %


 


Plt Count  103 L    (182-369)  x10^3/uL


 


MPV  10.0    (9.4-12.3)  fL


 


Gran %  65.1    (34.0-71.1)  %


 


Immature Gran % (Auto)  0.2    (0.001-0.429)  %


 


Nucleat RBC Rel Count  0.0    (0.00-0.2)  %


 


Eos # (Auto)  0.10    (0.04-0.36)  x10^3/uL


 


Immature Gran # (Auto)  0.02    (0.001-0.031)  x10^3u/L


 


Absolute Lymphs (auto)  2.68    (1.18-3.74)  x10^3/uL


 


Absolute Monos (auto)  0.60    (0.24-0.86)  x10^3/uL


 


Absolute Nucleated RBC  0.00    (0.00-0.012)  x10^3u/L


 


Lymphocytes %  27.4    (19.3-51.7)  %


 


Monocytes %  6.1    (4.7-12.5)  %


 


Eosinophils %  1.0    (0.7-5.8)  %


 


Basophils %  0.2    (0.1-1.2)  %


 


Absolute Granulocytes  6.35 H    (1.56-6.13)  x10^3/uL


 


Basophils #  0.02    (0.01-0.08)  x10^3/uL


 


ESR    84 H  (0-20)  mm/hr


 


Sodium   138   (135-145)  mmol/L


 


Potassium   4.0   (3.5-5.1)  mmol/L


 


Chloride   107   ()  mmol/L


 


Carbon Dioxide   23   (22-30)  mmol/L


 


Anion Gap   12.1   (5-15)  MEQ/L


 


BUN   25 H   (7-17)  mg/dL


 


Creatinine   0.99   (0.52-1.04)  mg/dL


 


Estimated GFR   57.6   ML/MIN


 


Glucose   120 H   ()  mg/dL


 


POC Glucometer     (74 to 106)  mg/dL


 


Hemoglobin A1c     (4.5-6.0)  %


 


Lactic Acid     (0.4-2.0)  


 


Calcium   9.5   (8.4-10.2)  mg/dL


 


Total Bilirubin   1.00   (0.2-1.3)  mg/dL


 


AST   36   (14-36)  U/L


 


ALT   28   (0-35)  U/L


 


Alkaline Phosphatase   95   ()  U/L


 


Serum Total Protein   7.4   (6.3-8.2)  g/dL


 


Albumin   4.0   (3.5-5.0)  g/dL


 


Procalcitonin     (0.030-0.080)  ng/mL














  08/19/24 08/19/24 08/19/24 Range/Units





  11:30 11:36 16:41 


 


WBC     (3.98-10.04)  x10^3/uL


 


RBC     (3.93-5.22)  x10^6/uL


 


Hgb     (11.2-15.7)  g/dL


 


Hct     (34.1-44.9)  %


 


MCV     (79.4-94.8)  fL


 


MCH     (25.6-32.2)  pg


 


MCHC     (32.2-35.5)  g/dL


 


RDW     (11.7-14.4)  %


 


Plt Count     (182-369)  x10^3/uL


 


MPV     (9.4-12.3)  fL


 


Gran %     (34.0-71.1)  %


 


Immature Gran % (Auto)     (0.001-0.429)  %


 


Nucleat RBC Rel Count     (0.00-0.2)  %


 


Eos # (Auto)     (0.04-0.36)  x10^3/uL


 


Immature Gran # (Auto)     (0.001-0.031)  x10^3u/L


 


Absolute Lymphs (auto)     (1.18-3.74)  x10^3/uL


 


Absolute Monos (auto)     (0.24-0.86)  x10^3/uL


 


Absolute Nucleated RBC     (0.00-0.012)  x10^3u/L


 


Lymphocytes %     (19.3-51.7)  %


 


Monocytes %     (4.7-12.5)  %


 


Eosinophils %     (0.7-5.8)  %


 


Basophils %     (0.1-1.2)  %


 


Absolute Granulocytes     (1.56-6.13)  x10^3/uL


 


Basophils #     (0.01-0.08)  x10^3/uL


 


ESR     (0-20)  mm/hr


 


Sodium     (135-145)  mmol/L


 


Potassium     (3.5-5.1)  mmol/L


 


Chloride     ()  mmol/L


 


Carbon Dioxide     (22-30)  mmol/L


 


Anion Gap     (5-15)  MEQ/L


 


BUN     (7-17)  mg/dL


 


Creatinine     (0.52-1.04)  mg/dL


 


Estimated GFR     ML/MIN


 


Glucose     ()  mg/dL


 


POC Glucometer    103  (74 to 106)  mg/dL


 


Hemoglobin A1c     (4.5-6.0)  %


 


Lactic Acid   1.2   (0.4-2.0)  


 


Calcium     (8.4-10.2)  mg/dL


 


Total Bilirubin     (0.2-1.3)  mg/dL


 


AST     (14-36)  U/L


 


ALT     (0-35)  U/L


 


Alkaline Phosphatase     ()  U/L


 


Serum Total Protein     (6.3-8.2)  g/dL


 


Albumin     (3.5-5.0)  g/dL


 


Procalcitonin  0.140 H    (0.030-0.080)  ng/mL














  08/19/24 08/19/24 08/20/24 Range/Units





  17:46 20:25 04:29 


 


WBC    7.3  (3.98-10.04)  x10^3/uL


 


RBC    3.45 L  (3.93-5.22)  x10^6/uL


 


Hgb    10.7 L  (11.2-15.7)  g/dL


 


Hct    33.8 L  (34.1-44.9)  %


 


MCV    98.0 H  (79.4-94.8)  fL


 


MCH    31.0  (25.6-32.2)  pg


 


MCHC    31.7 L  (32.2-35.5)  g/dL


 


RDW    14.5 H  (11.7-14.4)  %


 


Plt Count    96 L  (182-369)  x10^3/uL


 


MPV    9.7  (9.4-12.3)  fL


 


Gran %    48.1  (34.0-71.1)  %


 


Immature Gran % (Auto)    0.3  (0.001-0.429)  %


 


Nucleat RBC Rel Count    0.0  (0.00-0.2)  %


 


Eos # (Auto)    0.13  (0.04-0.36)  x10^3/uL


 


Immature Gran # (Auto)    0.02  (0.001-0.031)  x10^3u/L


 


Absolute Lymphs (auto)    3.08  (1.18-3.74)  x10^3/uL


 


Absolute Monos (auto)    0.54  (0.24-0.86)  x10^3/uL


 


Absolute Nucleated RBC    0.00  (0.00-0.012)  x10^3u/L


 


Lymphocytes %    42.1  (19.3-51.7)  %


 


Monocytes %    7.4  (4.7-12.5)  %


 


Eosinophils %    1.8  (0.7-5.8)  %


 


Basophils %    0.3  (0.1-1.2)  %


 


Absolute Granulocytes    3.53  (1.56-6.13)  x10^3/uL


 


Basophils #    0.02  (0.01-0.08)  x10^3/uL


 


ESR     (0-20)  mm/hr


 


Sodium     (135-145)  mmol/L


 


Potassium     (3.5-5.1)  mmol/L


 


Chloride     ()  mmol/L


 


Carbon Dioxide     (22-30)  mmol/L


 


Anion Gap     (5-15)  MEQ/L


 


BUN     (7-17)  mg/dL


 


Creatinine     (0.52-1.04)  mg/dL


 


Estimated GFR     ML/MIN


 


Glucose     ()  mg/dL


 


POC Glucometer   166 H   (74 to 106)  mg/dL


 


Hemoglobin A1c  6.78 H    (4.5-6.0)  %


 


Lactic Acid     (0.4-2.0)  


 


Calcium     (8.4-10.2)  mg/dL


 


Total Bilirubin     (0.2-1.3)  mg/dL


 


AST     (14-36)  U/L


 


ALT     (0-35)  U/L


 


Alkaline Phosphatase     ()  U/L


 


Serum Total Protein     (6.3-8.2)  g/dL


 


Albumin     (3.5-5.0)  g/dL


 


Procalcitonin     (0.030-0.080)  ng/mL











Radiology Exams: 


                              Radiology Procedures











 Category Date Time Status


 


 VENOUS BILATERAL EXTREMITY [US] Stat Exams  08/19/24 11:37 Completed














Assessment/Plan


(1) Bilateral lower leg cellulitis


Current Visit: Yes   Status: Acute   


Assessment & Plan: 


-Venous stasis dermatitis vs cellulitis


-neli borders


-PCT, ESR, CRP


-elevate affected extremities 


-Wound culture any open areas - none currently on exam


-venous doppler negative for DVT


-Unasyn in ED, will continue with ceftriaxone/triamcinolone cream


Code(s): L03.116 - CELLULITIS OF LEFT LOWER LIMB; L03.115 - CELLULITIS OF RIGHT 

LOWER LIMB   





(2) COPD (chronic obstructive pulmonary disease)


Current Visit: Yes   Status: Acute   


Assessment & Plan: 


-RA at baseline


-Does not appear to be in exacerbation


-continue home meds


-NEBs/oxygen as needed








(3) HTN (hypertension)


Current Visit: Yes   Status: Acute   


Assessment & Plan: 


-stable, continue home meds


Code(s): I10 - ESSENTIAL (PRIMARY) HYPERTENSION   





(4) HLD (hyperlipidemia)


Current Visit: Yes   Status: Acute   


Assessment & Plan: 


-continue statin


Code(s): E78.5 - HYPERLIPIDEMIA, UNSPECIFIED   





(5) Peripheral neuropathy


Current Visit: Yes   Status: Acute   


Assessment & Plan: 


-continue gabapentin


Code(s): G62.9 - POLYNEUROPATHY, UNSPECIFIED   





(6) Diabetes mellitus


Current Visit: No   Status: Acute   


Qualifiers: 


   Diabetes mellitus type: type 2   Diabetes mellitus complication status: with 

kidney complications   Diabetes mellitus complication detail: with chronic 

kidney disease   Chronic kidney disease stage: stage 3 (moderate) 


Assessment & Plan: 


-ADA diet


-A1c


-SSI





VTE: lovenox


PPI: omeprazole


Dispo: 1-2 days


Code status: Full


Code(s): E11.9 - TYPE 2 DIABETES MELLITUS WITHOUT COMPLICATIONS   





Code(s): L03.116 - CELLULITIS OF LEFT LOWER LIMB; L03.115 - CELLULITIS OF RIGHT 

LOWER LIMB   





(2) COPD (chronic obstructive pulmonary disease)


Current Visit: Yes   Status: Acute   





(3) HTN (hypertension)


Current Visit: Yes   Status: Acute   Code(s): I10 - ESSENTIAL (PRIMARY) 

HYPERTENSION   





(4) HLD (hyperlipidemia)


Current Visit: Yes   Status: Acute   Code(s): E78.5 - HYPERLIPIDEMIA, 

UNSPECIFIED   





(5) Peripheral neuropathy


Current Visit: Yes   Status: Acute   Code(s): G62.9 - POLYNEUROPATHY, 

UNSPECIFIED   





(6) Diabetes mellitus


Current Visit: No   Status: Acute   


Qualifiers: 


   Diabetes mellitus type: type 2   Diabetes mellitus complication status: with 

kidney complications   Diabetes mellitus complication detail: with chronic 

kidney disease   Chronic kidney disease stage: stage 3 (moderate) 


Code(s): E11.9 - TYPE 2 DIABETES MELLITUS WITHOUT COMPLICATIONS

## 2024-08-20 NOTE — PCM.DS
Discharge Summary


Date of Admission: 


08/19/24 14:10





Date of Discharge: 





8/20/24


Admitting Physician: 


ARTHUR VASQUES MD





Primary Care Provider: 


[facility]








Allergies


Allergies





sulfamethoxazole [From Bactrim] Allergy (Verified 01/20/23 23:00)


   


   itching, SOB 


trimethoprim [From Bactrim] Allergy (Verified 01/20/23 23:00)


   


zinc Adverse Reaction (Mild, Verified 01/20/23 23:00)


   Rash


zinc oxide Adverse Reaction (Mild, Verified 01/20/23 23:00)


   Rash


satin tape Allergy (Uncoded 01/20/23 23:00)


   











Hospital Summary





- Hospital Course


Hospital Course: 


 is a 79 year old female with a pmhx of RLS, HTN, DM, GERD, HLD, COPD 

(RA at baseline), EVGENY, recurrent UTI ( on Keflex prophylactically- Urology Jennifer 

Angelica), and peripheral neuropathy who presented to ED 8/19/24 with a three day 

history of BLE edema, pain,  and redness. Patient reports past issues with BLE 

edema for which she takes Bumex. She denies any fever, open wounds, or drainage.

Lab findings unremarkable. Venous doppler negative for DVT. Received Unasyn in 

the ED. IP treatment with Ceftriaxone and triamcinolone.  No overnight events 

noted. Edema/redness/pain improved. Underlying stasis dermatitis. Advised 

patient to elevate legs at home. Will send home with cefuroxime and tr

iamcinolone cream. Advised follow up with PCP. 


Discharge Note





New Diagnosis: Cellulitis





New Medications:cefuroxime and triamcinolone cream





Follow Up: pcp








Latest Assessment & Plan


(1) Bilateral lower leg cellulitis


Current Visit: Yes   Status: Acute   


Assessment & Plan: 


-Venous stasis dermatitis vs cellulitis


-neli skaggs


-PCT, ESR, CRP


-elevate affected extremities 


-Wound culture any open areas - none currently on exam


-venous doppler negative for DVT


-Unasyn in ED, will continue with ceftriaxone/triamcinolone cream


Code(s): L03.116 - CELLULITIS OF LEFT LOWER LIMB; L03.115 - CELLULITIS OF RIGHT 

LOWER LIMB   





(2) COPD (chronic obstructive pulmonary disease)


Current Visit: Yes   Status: Acute   


Assessment & Plan: 


-RA at baseline


-Does not appear to be in exacerbation


-continue home meds


-NEBs/oxygen as needed








(3) HTN (hypertension)


Current Visit: Yes   Status: Acute   


Assessment & Plan: 


-stable, continue home meds


Code(s): I10 - ESSENTIAL (PRIMARY) HYPERTENSION   





(4) HLD (hyperlipidemia)


Current Visit: Yes   Status: Acute   


Assessment & Plan: 


-continue statin


Code(s): E78.5 - HYPERLIPIDEMIA, UNSPECIFIED   





(5) Peripheral neuropathy


Current Visit: Yes   Status: Acute   


Assessment & Plan: 


-continue gabapentin


Code(s): G62.9 - POLYNEUROPATHY, UNSPECIFIED   





(6) Diabetes mellitus


Current Visit: No   Status: Acute   


Qualifiers: 


   Diabetes mellitus type: type 2   Diabetes mellitus complication status: with 

kidney complications   Diabetes mellitus complication detail: with chronic 

kidney disease   Chronic kidney disease stage: stage 3 (moderate) 


Assessment & Plan: 


-ADA diet


-A1c


-SSI








I spent 35 minutes face-to-face with the patient on the day of discharge 

performing discharge exam, discussing hospital stay and discharge instructions 

with patient and caregivers, preparation of discharge records, prescriptions & 

referral forms and addressing any questions/concerns the patient had as document

ed above.











- Vitals & Intake/Output


Vital Signs: 





                                   Vital Signs











Temperature  97.7 F   08/20/24 07:30


 


Pulse Rate  61   08/20/24 07:30


 


Respiratory Rate  18   08/20/24 07:30


 


Blood Pressure  135/65   08/20/24 07:30


 


O2 Sat by Pulse Oximetry  99   08/20/24 07:30











Intake & Output: 





                                 Intake & Output











 08/17/24 08/18/24 08/19/24 08/20/24





 11:59 11:59 11:59 11:59


 


Intake Total    360


 


Balance    360


 


Weight   128.7 kg 128.6 kg














- Lab


Result Diagrams: 


                                 08/20/24 04:29





                                 08/20/24 04:29


Lab Results-Last 24 Hrs: 





                            Lab Results-Last 24 Hours











  08/19/24 08/19/24 08/19/24 Range/Units





  11:05 11:05 11:30 


 


WBC  9.8    (3.98-10.04)  x10^3/uL


 


RBC  3.75 L    (3.93-5.22)  x10^6/uL


 


Hgb  11.8    (11.2-15.7)  g/dL


 


Hct  36.7    (34.1-44.9)  %


 


MCV  97.9 H    (79.4-94.8)  fL


 


MCH  31.5    (25.6-32.2)  pg


 


MCHC  32.2    (32.2-35.5)  g/dL


 


RDW  14.5 H    (11.7-14.4)  %


 


Plt Count  103 L    (182-369)  x10^3/uL


 


MPV  10.0    (9.4-12.3)  fL


 


Gran %  65.1    (34.0-71.1)  %


 


Immature Gran % (Auto)  0.2    (0.001-0.429)  %


 


Nucleat RBC Rel Count  0.0    (0.00-0.2)  %


 


Eos # (Auto)  0.10    (0.04-0.36)  x10^3/uL


 


Immature Gran # (Auto)  0.02    (0.001-0.031)  x10^3u/L


 


Absolute Lymphs (auto)  2.68    (1.18-3.74)  x10^3/uL


 


Absolute Monos (auto)  0.60    (0.24-0.86)  x10^3/uL


 


Absolute Nucleated RBC  0.00    (0.00-0.012)  x10^3u/L


 


Lymphocytes %  27.4    (19.3-51.7)  %


 


Monocytes %  6.1    (4.7-12.5)  %


 


Eosinophils %  1.0    (0.7-5.8)  %


 


Basophils %  0.2    (0.1-1.2)  %


 


Absolute Granulocytes  6.35 H    (1.56-6.13)  x10^3/uL


 


Basophils #  0.02    (0.01-0.08)  x10^3/uL


 


ESR    84 H  (0-20)  mm/hr


 


Sodium   138   (135-145)  mmol/L


 


Potassium   4.0   (3.5-5.1)  mmol/L


 


Chloride   107   ()  mmol/L


 


Carbon Dioxide   23   (22-30)  mmol/L


 


Anion Gap   12.1   (5-15)  MEQ/L


 


BUN   25 H   (7-17)  mg/dL


 


Creatinine   0.99   (0.52-1.04)  mg/dL


 


Estimated GFR   57.6   ML/MIN


 


Glucose   120 H   ()  mg/dL


 


POC Glucometer     (74 to 106)  mg/dL


 


Hemoglobin A1c     (4.5-6.0)  %


 


Lactic Acid     (0.4-2.0)  


 


Calcium   9.5   (8.4-10.2)  mg/dL


 


Total Bilirubin   1.00   (0.2-1.3)  mg/dL


 


AST   36   (14-36)  U/L


 


ALT   28   (0-35)  U/L


 


Alkaline Phosphatase   95   ()  U/L


 


Serum Total Protein   7.4   (6.3-8.2)  g/dL


 


Albumin   4.0   (3.5-5.0)  g/dL


 


Procalcitonin     (0.030-0.080)  ng/mL














  08/19/24 08/19/24 08/19/24 Range/Units





  11:30 11:36 16:41 


 


WBC     (3.98-10.04)  x10^3/uL


 


RBC     (3.93-5.22)  x10^6/uL


 


Hgb     (11.2-15.7)  g/dL


 


Hct     (34.1-44.9)  %


 


MCV     (79.4-94.8)  fL


 


MCH     (25.6-32.2)  pg


 


MCHC     (32.2-35.5)  g/dL


 


RDW     (11.7-14.4)  %


 


Plt Count     (182-369)  x10^3/uL


 


MPV     (9.4-12.3)  fL


 


Gran %     (34.0-71.1)  %


 


Immature Gran % (Auto)     (0.001-0.429)  %


 


Nucleat RBC Rel Count     (0.00-0.2)  %


 


Eos # (Auto)     (0.04-0.36)  x10^3/uL


 


Immature Gran # (Auto)     (0.001-0.031)  x10^3u/L


 


Absolute Lymphs (auto)     (1.18-3.74)  x10^3/uL


 


Absolute Monos (auto)     (0.24-0.86)  x10^3/uL


 


Absolute Nucleated RBC     (0.00-0.012)  x10^3u/L


 


Lymphocytes %     (19.3-51.7)  %


 


Monocytes %     (4.7-12.5)  %


 


Eosinophils %     (0.7-5.8)  %


 


Basophils %     (0.1-1.2)  %


 


Absolute Granulocytes     (1.56-6.13)  x10^3/uL


 


Basophils #     (0.01-0.08)  x10^3/uL


 


ESR     (0-20)  mm/hr


 


Sodium     (135-145)  mmol/L


 


Potassium     (3.5-5.1)  mmol/L


 


Chloride     ()  mmol/L


 


Carbon Dioxide     (22-30)  mmol/L


 


Anion Gap     (5-15)  MEQ/L


 


BUN     (7-17)  mg/dL


 


Creatinine     (0.52-1.04)  mg/dL


 


Estimated GFR     ML/MIN


 


Glucose     ()  mg/dL


 


POC Glucometer    103  (74 to 106)  mg/dL


 


Hemoglobin A1c     (4.5-6.0)  %


 


Lactic Acid   1.2   (0.4-2.0)  


 


Calcium     (8.4-10.2)  mg/dL


 


Total Bilirubin     (0.2-1.3)  mg/dL


 


AST     (14-36)  U/L


 


ALT     (0-35)  U/L


 


Alkaline Phosphatase     ()  U/L


 


Serum Total Protein     (6.3-8.2)  g/dL


 


Albumin     (3.5-5.0)  g/dL


 


Procalcitonin  0.140 H    (0.030-0.080)  ng/mL














  08/19/24 08/19/24 08/20/24 Range/Units





  17:46 20:25 04:29 


 


WBC    7.3  (3.98-10.04)  x10^3/uL


 


RBC    3.45 L  (3.93-5.22)  x10^6/uL


 


Hgb    10.7 L  (11.2-15.7)  g/dL


 


Hct    33.8 L  (34.1-44.9)  %


 


MCV    98.0 H  (79.4-94.8)  fL


 


MCH    31.0  (25.6-32.2)  pg


 


MCHC    31.7 L  (32.2-35.5)  g/dL


 


RDW    14.5 H  (11.7-14.4)  %


 


Plt Count    96 L  (182-369)  x10^3/uL


 


MPV    9.7  (9.4-12.3)  fL


 


Gran %    48.1  (34.0-71.1)  %


 


Immature Gran % (Auto)    0.3  (0.001-0.429)  %


 


Nucleat RBC Rel Count    0.0  (0.00-0.2)  %


 


Eos # (Auto)    0.13  (0.04-0.36)  x10^3/uL


 


Immature Gran # (Auto)    0.02  (0.001-0.031)  x10^3u/L


 


Absolute Lymphs (auto)    3.08  (1.18-3.74)  x10^3/uL


 


Absolute Monos (auto)    0.54  (0.24-0.86)  x10^3/uL


 


Absolute Nucleated RBC    0.00  (0.00-0.012)  x10^3u/L


 


Lymphocytes %    42.1  (19.3-51.7)  %


 


Monocytes %    7.4  (4.7-12.5)  %


 


Eosinophils %    1.8  (0.7-5.8)  %


 


Basophils %    0.3  (0.1-1.2)  %


 


Absolute Granulocytes    3.53  (1.56-6.13)  x10^3/uL


 


Basophils #    0.02  (0.01-0.08)  x10^3/uL


 


ESR     (0-20)  mm/hr


 


Sodium     (135-145)  mmol/L


 


Potassium     (3.5-5.1)  mmol/L


 


Chloride     ()  mmol/L


 


Carbon Dioxide     (22-30)  mmol/L


 


Anion Gap     (5-15)  MEQ/L


 


BUN     (7-17)  mg/dL


 


Creatinine     (0.52-1.04)  mg/dL


 


Estimated GFR     ML/MIN


 


Glucose     ()  mg/dL


 


POC Glucometer   166 H   (74 to 106)  mg/dL


 


Hemoglobin A1c  6.78 H    (4.5-6.0)  %


 


Lactic Acid     (0.4-2.0)  


 


Calcium     (8.4-10.2)  mg/dL


 


Total Bilirubin     (0.2-1.3)  mg/dL


 


AST     (14-36)  U/L


 


ALT     (0-35)  U/L


 


Alkaline Phosphatase     ()  U/L


 


Serum Total Protein     (6.3-8.2)  g/dL


 


Albumin     (3.5-5.0)  g/dL


 


Procalcitonin     (0.030-0.080)  ng/mL














  08/20/24 08/20/24 Range/Units





  04:29 06:49 


 


WBC    (3.98-10.04)  x10^3/uL


 


RBC    (3.93-5.22)  x10^6/uL


 


Hgb    (11.2-15.7)  g/dL


 


Hct    (34.1-44.9)  %


 


MCV    (79.4-94.8)  fL


 


MCH    (25.6-32.2)  pg


 


MCHC    (32.2-35.5)  g/dL


 


RDW    (11.7-14.4)  %


 


Plt Count    (182-369)  x10^3/uL


 


MPV    (9.4-12.3)  fL


 


Gran %    (34.0-71.1)  %


 


Immature Gran % (Auto)    (0.001-0.429)  %


 


Nucleat RBC Rel Count    (0.00-0.2)  %


 


Eos # (Auto)    (0.04-0.36)  x10^3/uL


 


Immature Gran # (Auto)    (0.001-0.031)  x10^3u/L


 


Absolute Lymphs (auto)    (1.18-3.74)  x10^3/uL


 


Absolute Monos (auto)    (0.24-0.86)  x10^3/uL


 


Absolute Nucleated RBC    (0.00-0.012)  x10^3u/L


 


Lymphocytes %    (19.3-51.7)  %


 


Monocytes %    (4.7-12.5)  %


 


Eosinophils %    (0.7-5.8)  %


 


Basophils %    (0.1-1.2)  %


 


Absolute Granulocytes    (1.56-6.13)  x10^3/uL


 


Basophils #    (0.01-0.08)  x10^3/uL


 


ESR    (0-20)  mm/hr


 


Sodium  138   (135-145)  mmol/L


 


Potassium  4.0   (3.5-5.1)  mmol/L


 


Chloride  109 H   ()  mmol/L


 


Carbon Dioxide  21 L   (22-30)  mmol/L


 


Anion Gap  12.0   (5-15)  MEQ/L


 


BUN  24 H   (7-17)  mg/dL


 


Creatinine  0.87   (0.52-1.04)  mg/dL


 


Estimated GFR  67.3   ML/MIN


 


Glucose  105   ()  mg/dL


 


POC Glucometer   88  (74 to 106)  mg/dL


 


Hemoglobin A1c    (4.5-6.0)  %


 


Lactic Acid    (0.4-2.0)  


 


Calcium  8.9   (8.4-10.2)  mg/dL


 


Total Bilirubin  0.60   (0.2-1.3)  mg/dL


 


AST  27   (14-36)  U/L


 


ALT  22   (0-35)  U/L


 


Alkaline Phosphatase  95   ()  U/L


 


Serum Total Protein  6.5   (6.3-8.2)  g/dL


 


Albumin  3.4 L   (3.5-5.0)  g/dL


 


Procalcitonin    (0.030-0.080)  ng/mL











Micro Results-Entire Visit: 





                                   Accuchecks











Date                           08/20/24

















- Radiology Exams


Ordered Rad Exams-Entire Visit: 





                              Radiology Procedures











 Category Date Time Status


 


 VENOUS BILATERAL EXTREMITY [US] Stat Exams  08/19/24 11:37 Completed














- Procedures and Test


Procedures and Tests throughout Hospitalization: 





                            Therapy Orders & Screens





08/19/24 16:31


OT Screen per Nursing Assess ONCE 


   Comment: Protocol Order


   Physician Instructions: Greater than 3 points order OT Admission Screening


   Reason For Exam: Triggered on Admission


   Diagnosis: Bilateral lower extremity cellulitis


   Open Wound/Cellutlitis/Pressure Ulcers: Yes: BLE


   Acute Fx/ORIF/Change in wt bearing status: No


   Severe MUSCULOSKELETAL pain: No


   ADL Dysfunction: No


   Acute CVA w/Hemiparesis/Hemiplegia: No


   Decreased Functional Mobility/Strength: No


   Sprain/Strain: No


   Acute Post-op Mobility Dysfunction: No


   Total Points: 5


PT Screen per Nursing Assess ONCE 


   Comment: Protocol Order


   Physician Instructions: Greater than 3 points order PT Admission Screenin


   Reason For Exam: Triggered on Admission


   Diagnosis: Bilateral lower extremity cellulitis


   Open Wound/Cellutlitis/Pressure Ulcers: Yes: BLE


   Acute Fx/ORIF/Change in wt bearing status: No


   Severe MUSCULOSKELETAL pain: No


   ADL Dysfunction: No


   Acute CVA w/Hemiparesis/Hemiplegia: No


   Decreased Functional Mobility/Strength: No


   Sprain/Strain: No


   Acute Post-op Mobility Dysfunction: No


   Total Points: 5


RT Screen per Nursing Assess ONCE 


   Comment: Protocol Order


   Physician Instructions: Greater than 3 points order RT Admission Screen


   Reason For Exam: Triggered on Admission


   Diagnosis: Bilateral lower extremity cellulitis


   Diagnosis: Bilateral lower extremity cellulitis


   Pneumonia: No


   Home O2: Yes: 2L HS


   Asthma: No


   CHF: No


   Home CPAP/BIPAP: No


   Home Nebs/MDI: Yes: SYMBICORT


   Total Points: 10





08/19/24 16:43


Respiratory Therapy Assessment DAILY 


   Comment: 


   Diagnosis: Bilateral lower extremity cellulitis





08/19/24 16:44


Oxygen NASAL CANNULA 2 lpm 


   Comment: 


   Diagnosis: Bilateral lower extremity cellulitis














Discharge Exam


General Appearance: no apparent distress


Neurologic Exam: alert, oriented x 3, cooperative


Eye Exam: PERRL


Ears, Nose, Throat Exam: normal ENT inspection


Neck Exam: normal inspection


Respiratory Exam: normal breath sounds, lungs clear


Cardiovascular Exam: regular rate/rhythm, normal heart sounds


Gastrointestinal/Abdomen Exam: soft, normal bowel sounds


Pelvic Exam: deferred


Rectal Exam: deferred


Back Exam: normal inspection


Skin Exam: other (BLE edema +2 pitting)





Final Diagnosis/Problem List





- Final Discharge Diagnosis/Problem


(1) Bilateral lower leg cellulitis


Current Visit: Yes   Status: Acute   Code(s): L03.116 - CELLULITIS OF LEFT LOWER

 LIMB; L03.115 - CELLULITIS OF RIGHT LOWER LIMB   





(2) COPD (chronic obstructive pulmonary disease)


Current Visit: Yes   Status: Chronic   





(3) HTN (hypertension)


Current Visit: Yes   Status: Chronic   Code(s): I10 - ESSENTIAL (PRIMARY) 

HYPERTENSION   





(4) HLD (hyperlipidemia)


Current Visit: Yes   Status: Chronic   Code(s): E78.5 - HYPERLIPIDEMIA, 

UNSPECIFIED   





(5) Peripheral neuropathy


Current Visit: Yes   Status: Chronic   Code(s): G62.9 - POLYNEUROPATHY, 

UNSPECIFIED   





(6) Diabetes mellitus


Current Visit: No   Status: Chronic   Code(s): E11.9 - TYPE 2 DIABETES MELLITUS 

WITHOUT COMPLICATIONS   





- Discharge


Disposition: Home, Self-Care


Condition: Stable


Prescriptions: 


New


   Triamcinolone 0.1% Cream*** [Kenalog 0.1% Cream 15 gm***] See Rx Instructions

.ROUTE .COMPLEX 7 Days #1 misc


   Cefuroxime Axetil [Cefuroxime] 500 mg PO BID 5 Days #10 tablet





Continue


   Pravastatin Sodium 20 mg PO QHS


   Insulin Aspart** [NovoLOG Insulin**] 7 units SQ ACHS


   Ropinirole HCl 3 mg PO QHS


   Gabapentin 600 mg PO BID


   Omeprazole 20 mg PO DAILY


   Mirabegron [Myrbetriq] 50 mg PO HS


   Dapagliflozin Propanediol [Farxiga] 10 mg PO HS


   Nystatin Cream 30 gm*** [Nystop 30 gm Cream***] 1 applic TOP BID


   Cyclobenzaprine HCl 5 mg PO HS


   Metoprolol Tartrate 25 mg*** [Lopressor 25MG Tab***] 12.5 mg PO DAILY


   Folic Acid 1 mg PO DAILY


   Aspirin [Low Dose Aspirin EC] 81 mg PO DAILY


   Semaglutide [Ozempic] 2 mg SQ WEEKLY


   Insulin Glargine,Hum.rec.anlog [Toujeo Solostar] 20 unit SQ HS


   Linaclotide [Linzess] 245 mcg PO DAILY


   Budesonide/Formoterol Fumarate [Budesonide-Formoterol 160-4.5] 10.2 gm IH BID





Discontinued


   Cephalexin Mh 500 mg** [Keflex 500 mg**] 500 mg PO HS


Additional Instructions: 


Hold prophylactic Keflex until Cefuroxime is complete


Follow up with: 


JOHN MARTINEZ MD [ACTIVE STAFF] - 08/28/24 10:45 am